# Patient Record
Sex: MALE | Race: WHITE | Employment: OTHER | URBAN - NONMETROPOLITAN AREA
[De-identification: names, ages, dates, MRNs, and addresses within clinical notes are randomized per-mention and may not be internally consistent; named-entity substitution may affect disease eponyms.]

---

## 2017-10-16 ENCOUNTER — HOSPITAL ENCOUNTER (EMERGENCY)
Age: 67
Discharge: HOME | End: 2017-10-16
Payer: MEDICARE

## 2017-10-16 VITALS
DIASTOLIC BLOOD PRESSURE: 68 MMHG | OXYGEN SATURATION: 96 % | HEART RATE: 85 BPM | RESPIRATION RATE: 18 BRPM | SYSTOLIC BLOOD PRESSURE: 131 MMHG | TEMPERATURE: 98.2 F

## 2017-10-16 DIAGNOSIS — S61.412A LACERATION OF LEFT HAND, FOREIGN BODY PRESENCE UNSPECIFIED, INITIAL ENCOUNTER: Primary | ICD-10-CM

## 2017-10-16 PROCEDURE — 6370000000 HC RX 637 (ALT 250 FOR IP): Performed by: PHYSICIAN ASSISTANT

## 2017-10-16 PROCEDURE — A6446 CONFORM BAND S W>=3" <5"/YD: HCPCS

## 2017-10-16 PROCEDURE — 99282 EMERGENCY DEPT VISIT SF MDM: CPT

## 2017-10-16 PROCEDURE — 2500000003 HC RX 250 WO HCPCS: Performed by: PHYSICIAN ASSISTANT

## 2017-10-16 PROCEDURE — 12001 RPR S/N/AX/GEN/TRNK 2.5CM/<: CPT

## 2017-10-16 RX ORDER — GINSENG 100 MG
CAPSULE ORAL ONCE
Status: COMPLETED | OUTPATIENT
Start: 2017-10-16 | End: 2017-10-16

## 2017-10-16 RX ORDER — LIDOCAINE HYDROCHLORIDE 10 MG/ML
5 INJECTION, SOLUTION INFILTRATION; PERINEURAL ONCE
Status: COMPLETED | OUTPATIENT
Start: 2017-10-16 | End: 2017-10-16

## 2017-10-16 RX ADMIN — BACITRACIN: 500 OINTMENT TOPICAL at 23:06

## 2017-10-16 RX ADMIN — LIDOCAINE HYDROCHLORIDE 5 ML: 10 INJECTION, SOLUTION INFILTRATION; PERINEURAL at 23:06

## 2017-10-16 ASSESSMENT — ENCOUNTER SYMPTOMS
ABDOMINAL PAIN: 0
RHINORRHEA: 0
WHEEZING: 0
SORE THROAT: 0
BACK PAIN: 0
EYE DISCHARGE: 0
NAUSEA: 0
COUGH: 0
EYE REDNESS: 0
VOMITING: 0
SHORTNESS OF BREATH: 0
DIARRHEA: 0

## 2017-10-16 ASSESSMENT — PAIN SCALES - GENERAL: PAINLEVEL_OUTOF10: 1

## 2017-10-17 NOTE — ED TRIAGE NOTES
Pt presents to ED with laceration on his left hand in between his thumb and index finger. Pt denies pain. No active bleeding noted at this time.

## 2017-10-17 NOTE — ED PROVIDER NOTES
[facility]  eMERGENCY dEPARTMENT eNCOUnter          CHIEF COMPLAINT       Chief Complaint   Patient presents with    Laceration       Nurses Notes reviewed and I agree except as noted in the HPI. HISTORY OF PRESENT ILLNESS    Ashley Samuels is a 79 y.o. male who presents to the Emergency Department for the evaluation of laceration to the web space between the 1st and 2nd digits on the left hand. The patient was cranking a cable to let down grain around 7:30 PM when the cable snapped and hit him. The patient stated he bleed a good amount at the beginning, but then it stopped by the time he got to his house. The patient ran his hand under water for a short time. The patient does not have many nerves in his hand due to another injury. The patient is right hand dominant. The patient's last tetanus shot was 3 or 4 years ago. The HPI was provided by the patient. REVIEW OF SYSTEMS     Review of Systems   Constitutional: Negative for appetite change, chills, fatigue and fever. HENT: Negative for congestion, ear pain, rhinorrhea and sore throat. Eyes: Negative for discharge, redness and visual disturbance. Respiratory: Negative for cough, shortness of breath and wheezing. Cardiovascular: Negative for chest pain, palpitations and leg swelling. Gastrointestinal: Negative for abdominal pain, diarrhea, nausea and vomiting. Genitourinary: Negative for decreased urine volume, difficulty urinating and dysuria. Musculoskeletal: Negative for arthralgias, back pain, joint swelling and neck pain. Skin: Positive for wound (laceration between web space of 1st and 2nd digit on left hand). Negative for pallor and rash. Allergic/Immunologic: Negative for environmental allergies. Neurological: Negative for dizziness, syncope, weakness, light-headedness and headaches. Hematological: Negative for adenopathy.    Psychiatric/Behavioral: Negative for agitation, confusion, dysphoric mood and exhibits no discharge. Left eye exhibits no discharge. No scleral icterus. Neck: Normal range of motion. Neck supple. No JVD present. Cardiovascular: Intact distal pulses. Pulmonary/Chest: Effort normal. No stridor. No respiratory distress. Abdominal: Soft. He exhibits no distension. Musculoskeletal: Normal range of motion. He exhibits no edema. Left wrist: Normal.        Left hand: He exhibits laceration (2.5 cm in web space betwen digit 1 and 2). He exhibits normal range of motion. Normal sensation noted. Normal strength (5/5) noted. Neurological: He is alert and oriented to person, place, and time. He displays no tremor. No sensory deficit. He exhibits normal muscle tone. Coordination normal. GCS eye subscore is 4. GCS verbal subscore is 5. GCS motor subscore is 6. Skin: Skin is warm and dry. He is not diaphoretic. No erythema. Psychiatric: He has a normal mood and affect. His behavior is normal.   Nursing note and vitals reviewed. DIFFERENTIAL DIAGNOSIS:   Laceration to left web space between 1st and 2nd digits    DIAGNOSTIC RESULTS     EKG: All EKG's are interpreted by the Emergency Department Physician who either signs or Co-signs this chart in the absence of a cardiologist.    None    RADIOLOGY: non-plain film images(s) such as CT, Ultrasound and MRI are read by the radiologist.    No orders to display     Patient refused xray    LABS:     Labs Reviewed - No data to display    EMERGENCY DEPARTMENT COURSE:   Vitals:    Vitals:    10/16/17 2047   BP: 131/68   Pulse: 85   Resp: 18   Temp: 98.2 °F (36.8 °C)   TempSrc: Oral   SpO2: 96%       9:38 PM: The patient was seen and evaluated. MDM:  Patient was seen and evaluated by me at bedside. Patient did not appear in any distress. History and physical exam were obtained. Neurovascular appears intact. No FB noted. No tendon injury noted. Patient declined X ray. Laceration repaired without complication.   The patient was comfortable that portions of this note were completed with a voice recognition program.  Efforts were made to edit the dictations but occasionally words are mis-transcribed.)    The patient was given an opportunity to see the Emergency Attending. The patient voiced understanding that I was a Mid-Level Provider and was in agreement with being seen independently by myself. Scribe:  Harrison Frausto 10/16/17 9:38 PM Scribing for and in the presence of [employer]. Signed by: Amaya Saldana, 10/16/17 9:59 PM    Provider:  I personally performed the services described in the documentation, reviewed and edited the documentation which was dictated to the scribe in my presence, and it accurately records my words and actions.     [employer] 10/16/17 9:59 PM        [employer], [id number]  10/18/17 3523

## 2018-08-31 ENCOUNTER — HOSPITAL ENCOUNTER (EMERGENCY)
Age: 68
Discharge: HOME | End: 2018-08-31
Attending: NURSE PRACTITIONER
Payer: MEDICARE

## 2018-08-31 VITALS
OXYGEN SATURATION: 97 % | DIASTOLIC BLOOD PRESSURE: 77 MMHG | BODY MASS INDEX: 25.73 KG/M2 | HEIGHT: 72 IN | HEART RATE: 106 BPM | SYSTOLIC BLOOD PRESSURE: 118 MMHG | TEMPERATURE: 97.7 F | WEIGHT: 190 LBS | RESPIRATION RATE: 18 BRPM

## 2018-08-31 DIAGNOSIS — S01.81XA FACIAL LACERATION, INITIAL ENCOUNTER: Primary | ICD-10-CM

## 2018-08-31 PROCEDURE — 12013 RPR F/E/E/N/L/M 2.6-5.0 CM: CPT

## 2018-08-31 PROCEDURE — 99213 OFFICE O/P EST LOW 20 MIN: CPT

## 2018-08-31 PROCEDURE — 2709999900 HC NON-CHARGEABLE SUPPLY

## 2018-08-31 PROCEDURE — 12013 RPR F/E/E/N/L/M 2.6-5.0 CM: CPT | Performed by: NURSE PRACTITIONER

## 2018-08-31 ASSESSMENT — PAIN DESCRIPTION - PAIN TYPE: TYPE: ACUTE PAIN

## 2018-08-31 ASSESSMENT — PAIN SCALES - GENERAL: PAINLEVEL_OUTOF10: 1

## 2018-08-31 ASSESSMENT — PAIN DESCRIPTION - FREQUENCY: FREQUENCY: CONTINUOUS

## 2018-08-31 ASSESSMENT — PAIN DESCRIPTION - ONSET: ONSET: SUDDEN

## 2018-08-31 ASSESSMENT — PAIN DESCRIPTION - DESCRIPTORS: DESCRIPTORS: SORE

## 2018-08-31 ASSESSMENT — PAIN DESCRIPTION - PROGRESSION: CLINICAL_PROGRESSION: NOT CHANGED

## 2018-08-31 ASSESSMENT — PAIN DESCRIPTION - LOCATION: LOCATION: FACE

## 2018-08-31 NOTE — ED PROVIDER NOTES
that he does not drink alcohol or use drugs. PHYSICAL EXAM     ED TRIAGE VITALS  BP: 118/77, Temp: 97.7 °F (36.5 °C), Pulse: 106, Resp: 18, SpO2: 97 %  Physical Exam   Constitutional: He is oriented to person, place, and time. He appears well-developed and well-nourished. No distress. Neurological: He is alert and oriented to person, place, and time. Skin: Skin is warm and dry. Laceration noted. Psychiatric: He has a normal mood and affect. Nursing note and vitals reviewed. DIAGNOSTIC RESULTS   Labs:No results found for this visit on 08/31/18. IMAGING:    URGENT CARE COURSE:     Vitals:    08/31/18 1728   BP: 118/77   Pulse: 106   Resp: 18   Temp: 97.7 °F (36.5 °C)   TempSrc: Temporal   SpO2: 97%   Weight: 190 lb (86.2 kg)   Height: 6' (1.829 m)       Medications - No data to display  PROCEDURES:  Lac Repair  Date/Time: 8/31/2018 6:00 PM  Performed by: Angelia Cardona by: Oneal Abel     Consent:     Consent obtained:  Verbal    Consent given by:  Patient    Risks discussed:  Poor cosmetic result, poor wound healing and infection  Anesthesia (see MAR for exact dosages): Anesthesia method:  Local infiltration    Local anesthetic:  Lidocaine 2% w/o epi  Laceration details:     Location:  Face    Face location:  R cheek    Length (cm):  3    Depth (mm):  0.2  Repair type:     Repair type:  Simple  Pre-procedure details:     Preparation:  Patient was prepped and draped in usual sterile fashion  Exploration:     Hemostasis achieved with:  Direct pressure    Contaminated: no    Treatment:     Area cleansed with:  Otoniel    Amount of cleaning:  Standard    Visualized foreign bodies/material removed: no    Skin repair:     Repair method:  Sutures    Suture size:  5-0    Suture technique:  Simple interrupted  Approximation:     Approximation:  Loose  Post-procedure details:     Dressing:  Non-adherent dressing    Patient tolerance of procedure:   Tolerated well, no immediate

## 2018-11-27 ENCOUNTER — OFFICE VISIT (OUTPATIENT)
Dept: ENT CLINIC | Age: 68
End: 2018-11-27
Payer: MEDICARE

## 2018-11-27 VITALS
HEART RATE: 80 BPM | RESPIRATION RATE: 14 BRPM | HEIGHT: 72 IN | SYSTOLIC BLOOD PRESSURE: 114 MMHG | BODY MASS INDEX: 26.55 KG/M2 | TEMPERATURE: 97.5 F | DIASTOLIC BLOOD PRESSURE: 76 MMHG | WEIGHT: 196 LBS

## 2018-11-27 DIAGNOSIS — J31.0 RHINITIS MEDICAMENTOSA: Primary | ICD-10-CM

## 2018-11-27 DIAGNOSIS — R68.2 DRY MOUTH: ICD-10-CM

## 2018-11-27 DIAGNOSIS — T48.5X5A RHINITIS MEDICAMENTOSA: Primary | ICD-10-CM

## 2018-11-27 DIAGNOSIS — R06.5 MOUTH BREATHING: ICD-10-CM

## 2018-11-27 PROCEDURE — G8419 CALC BMI OUT NRM PARAM NOF/U: HCPCS | Performed by: OTOLARYNGOLOGY

## 2018-11-27 PROCEDURE — G8427 DOCREV CUR MEDS BY ELIG CLIN: HCPCS | Performed by: OTOLARYNGOLOGY

## 2018-11-27 PROCEDURE — 1036F TOBACCO NON-USER: CPT | Performed by: OTOLARYNGOLOGY

## 2018-11-27 PROCEDURE — G8484 FLU IMMUNIZE NO ADMIN: HCPCS | Performed by: OTOLARYNGOLOGY

## 2018-11-27 PROCEDURE — 99203 OFFICE O/P NEW LOW 30 MIN: CPT | Performed by: OTOLARYNGOLOGY

## 2018-11-27 PROCEDURE — 3017F COLORECTAL CA SCREEN DOC REV: CPT | Performed by: OTOLARYNGOLOGY

## 2018-11-27 PROCEDURE — 1123F ACP DISCUSS/DSCN MKR DOCD: CPT | Performed by: OTOLARYNGOLOGY

## 2018-11-27 PROCEDURE — 4040F PNEUMOC VAC/ADMIN/RCVD: CPT | Performed by: OTOLARYNGOLOGY

## 2018-11-27 PROCEDURE — 1101F PT FALLS ASSESS-DOCD LE1/YR: CPT | Performed by: OTOLARYNGOLOGY

## 2018-11-27 RX ORDER — IPRATROPIUM BROMIDE 42 UG/1
2 SPRAY, METERED NASAL 3 TIMES DAILY
Qty: 1 BOTTLE | Refills: 3 | Status: SHIPPED | OUTPATIENT
Start: 2018-11-27 | End: 2020-07-02 | Stop reason: ALTCHOICE

## 2018-11-27 ASSESSMENT — ENCOUNTER SYMPTOMS
SHORTNESS OF BREATH: 0
FACIAL SWELLING: 0
SINUS PRESSURE: 0
VOICE CHANGE: 0
SORE THROAT: 0
NAUSEA: 0
COLOR CHANGE: 0
WHEEZING: 0
RHINORRHEA: 0
DIARRHEA: 0
ABDOMINAL PAIN: 0
VOMITING: 0
STRIDOR: 0
APNEA: 0
TROUBLE SWALLOWING: 0
CHOKING: 0
COUGH: 0
CHEST TIGHTNESS: 0

## 2019-01-15 ENCOUNTER — OFFICE VISIT (OUTPATIENT)
Dept: ENT CLINIC | Age: 69
End: 2019-01-15
Payer: MEDICARE

## 2019-01-15 VITALS
DIASTOLIC BLOOD PRESSURE: 72 MMHG | WEIGHT: 199 LBS | TEMPERATURE: 98.4 F | BODY MASS INDEX: 26.95 KG/M2 | RESPIRATION RATE: 16 BRPM | HEART RATE: 64 BPM | HEIGHT: 72 IN | SYSTOLIC BLOOD PRESSURE: 112 MMHG

## 2019-01-15 DIAGNOSIS — R09.81 NASAL CONGESTION: ICD-10-CM

## 2019-01-15 DIAGNOSIS — T48.5X5A RHINITIS MEDICAMENTOSA: ICD-10-CM

## 2019-01-15 DIAGNOSIS — J34.3 HYPERTROPHY OF INFERIOR NASAL TURBINATE: ICD-10-CM

## 2019-01-15 DIAGNOSIS — R68.2 DRY MOUTH: ICD-10-CM

## 2019-01-15 DIAGNOSIS — J31.0 RHINITIS MEDICAMENTOSA: ICD-10-CM

## 2019-01-15 DIAGNOSIS — R06.5 MOUTH BREATHING: ICD-10-CM

## 2019-01-15 DIAGNOSIS — J34.2 NASAL SEPTAL DEVIATION: Primary | ICD-10-CM

## 2019-01-15 PROCEDURE — G8427 DOCREV CUR MEDS BY ELIG CLIN: HCPCS | Performed by: OTOLARYNGOLOGY

## 2019-01-15 PROCEDURE — 3017F COLORECTAL CA SCREEN DOC REV: CPT | Performed by: OTOLARYNGOLOGY

## 2019-01-15 PROCEDURE — 1123F ACP DISCUSS/DSCN MKR DOCD: CPT | Performed by: OTOLARYNGOLOGY

## 2019-01-15 PROCEDURE — G8419 CALC BMI OUT NRM PARAM NOF/U: HCPCS | Performed by: OTOLARYNGOLOGY

## 2019-01-15 PROCEDURE — 4040F PNEUMOC VAC/ADMIN/RCVD: CPT | Performed by: OTOLARYNGOLOGY

## 2019-01-15 PROCEDURE — 1036F TOBACCO NON-USER: CPT | Performed by: OTOLARYNGOLOGY

## 2019-01-15 PROCEDURE — 1101F PT FALLS ASSESS-DOCD LE1/YR: CPT | Performed by: OTOLARYNGOLOGY

## 2019-01-15 PROCEDURE — G8484 FLU IMMUNIZE NO ADMIN: HCPCS | Performed by: OTOLARYNGOLOGY

## 2019-01-15 PROCEDURE — 99214 OFFICE O/P EST MOD 30 MIN: CPT | Performed by: OTOLARYNGOLOGY

## 2019-01-15 ASSESSMENT — ENCOUNTER SYMPTOMS
ABDOMINAL PAIN: 0
COUGH: 0
DIARRHEA: 0
APNEA: 0
SINUS PRESSURE: 0
FACIAL SWELLING: 0
COLOR CHANGE: 0
STRIDOR: 0
CHOKING: 0
RHINORRHEA: 0
CHEST TIGHTNESS: 0
TROUBLE SWALLOWING: 0
WHEEZING: 0
NAUSEA: 0
SHORTNESS OF BREATH: 0
VOICE CHANGE: 0
VOMITING: 0
SORE THROAT: 0

## 2019-03-08 DIAGNOSIS — T48.5X5A RHINITIS MEDICAMENTOSA: ICD-10-CM

## 2019-03-08 DIAGNOSIS — R06.5 MOUTH BREATHING: ICD-10-CM

## 2019-03-08 DIAGNOSIS — J34.2 NASAL SEPTAL DEVIATION: ICD-10-CM

## 2019-03-08 DIAGNOSIS — J34.3 HYPERTROPHY OF INFERIOR NASAL TURBINATE: ICD-10-CM

## 2019-03-08 DIAGNOSIS — J31.0 RHINITIS MEDICAMENTOSA: ICD-10-CM

## 2019-03-08 DIAGNOSIS — R68.2 DRY MOUTH: ICD-10-CM

## 2019-03-08 DIAGNOSIS — R09.81 NASAL CONGESTION: ICD-10-CM

## 2019-03-19 ENCOUNTER — OFFICE VISIT (OUTPATIENT)
Dept: ENT CLINIC | Age: 69
End: 2019-03-19

## 2019-03-19 VITALS
RESPIRATION RATE: 20 BRPM | SYSTOLIC BLOOD PRESSURE: 126 MMHG | BODY MASS INDEX: 27.21 KG/M2 | WEIGHT: 200.9 LBS | DIASTOLIC BLOOD PRESSURE: 78 MMHG | HEIGHT: 72 IN | TEMPERATURE: 97.6 F | HEART RATE: 66 BPM

## 2019-03-19 DIAGNOSIS — R68.2 DRY MOUTH: ICD-10-CM

## 2019-03-19 DIAGNOSIS — J34.3 HYPERTROPHY OF INFERIOR NASAL TURBINATE: ICD-10-CM

## 2019-03-19 DIAGNOSIS — R06.5 MOUTH BREATHING: ICD-10-CM

## 2019-03-19 DIAGNOSIS — J34.2 NASAL SEPTAL DEVIATION: Primary | ICD-10-CM

## 2019-03-19 DIAGNOSIS — R09.81 NASAL CONGESTION: ICD-10-CM

## 2019-03-19 PROCEDURE — 99999 PR OFFICE/OUTPT VISIT,PROCEDURE ONLY: CPT | Performed by: OTOLARYNGOLOGY

## 2019-03-19 ASSESSMENT — ENCOUNTER SYMPTOMS
CHEST TIGHTNESS: 0
SORE THROAT: 0
CHOKING: 0
ABDOMINAL PAIN: 0
NAUSEA: 0
SHORTNESS OF BREATH: 0
WHEEZING: 0
SINUS PRESSURE: 0
DIARRHEA: 0
COUGH: 0
TROUBLE SWALLOWING: 0
FACIAL SWELLING: 0
STRIDOR: 0
COLOR CHANGE: 0
VOICE CHANGE: 0
APNEA: 0
RHINORRHEA: 0
VOMITING: 0

## 2019-03-25 NOTE — PROGRESS NOTES
In preparation for their surgical procedure above patient was screened for Obstructive Sleep Apnea (VALDEZ) using the STOP-Bang Questionnaire by the Pre-Admission Testing department. This is a pre-surgical screening tool for patient safety and serves as a recommendation, this WILL NOT cause cancellation of surgery. STOP-Bang Questionnaire  * Do you currently see a pulmonologist?  No     If yes STOP, do not complete. 1.  Do you snore loudly (able to be heard in the next room)? Yes    2. Do you often feel tired or sleepy during the daytime? No       3. Has anyone ever told you that you stop breathing during your sleep? No    4. Do you have or are you being treated for high blood pressure? Yes      5. BMI more than 35? BMI (Calculated): 27.2        No    6. Age over 48 years? 76 y.o. Yes    7. Neck Circumference greater than 17 inches for male or 16 inches for female? Measured           (visits only)            Not Applicable    8. Gender Male? Yes      TOTAL SCORE: 4    VALDEZ - Low Risk : Yes to 0 - 2 questions  VALDEZ - Intermediate Risk : Yes to 3 - 4 questions  VALDEZ - High Risk : Yes to 5 - 8 questions    Adapted from:   STOP Questionnaire: A Tool to Screen Patients for Obstructive Sleep Apnea   CANDELARIA Nowak [facility] Alexi Greco M.B.B.S., Harris Clemens M.D., Jessica Vo, Ph.D., Jt Madrid M.B.B.S., Ruth Eng, M.Sc., Jennifer Griffin M.D., [facility]. [facility]    Anesthesiology 2008; 379:[id number] Copyright 2008, the [address] of Anesthesi[employer], [facility] 37.   ----------------------------------------------------------------------------------------------------------------

## 2019-03-28 ENCOUNTER — TELEPHONE (OUTPATIENT)
Dept: ENT CLINIC | Age: 69
End: 2019-03-28

## 2019-04-01 ENCOUNTER — HOSPITAL ENCOUNTER (OUTPATIENT)
Age: 69
Setting detail: OUTPATIENT SURGERY
Discharge: HOME | End: 2019-04-01
Attending: OTOLARYNGOLOGY | Admitting: OTOLARYNGOLOGY
Payer: MEDICARE

## 2019-04-01 ENCOUNTER — ANESTHESIA EVENT (OUTPATIENT)
Dept: OPERATING ROOM | Age: 69
End: 2019-04-01
Payer: MEDICARE

## 2019-04-01 ENCOUNTER — ANESTHESIA (OUTPATIENT)
Dept: OPERATING ROOM | Age: 69
End: 2019-04-01
Payer: MEDICARE

## 2019-04-01 VITALS
WEIGHT: 199.6 LBS | HEIGHT: 72 IN | HEART RATE: 86 BPM | BODY MASS INDEX: 27.04 KG/M2 | DIASTOLIC BLOOD PRESSURE: 85 MMHG | OXYGEN SATURATION: 99 % | TEMPERATURE: 96.5 F | RESPIRATION RATE: 16 BRPM | SYSTOLIC BLOOD PRESSURE: 159 MMHG

## 2019-04-01 VITALS
RESPIRATION RATE: 2 BRPM | SYSTOLIC BLOOD PRESSURE: 159 MMHG | DIASTOLIC BLOOD PRESSURE: 88 MMHG | TEMPERATURE: 94.8 F | OXYGEN SATURATION: 99 %

## 2019-04-01 DIAGNOSIS — J34.3 HYPERTROPHY OF INFERIOR NASAL TURBINATE: ICD-10-CM

## 2019-04-01 DIAGNOSIS — J34.2 NASAL SEPTAL DEVIATION: Primary | ICD-10-CM

## 2019-04-01 PROCEDURE — 2500000003 HC RX 250 WO HCPCS: Performed by: OTOLARYNGOLOGY

## 2019-04-01 PROCEDURE — 6360000002 HC RX W HCPCS: Performed by: OTOLARYNGOLOGY

## 2019-04-01 PROCEDURE — 2500000003 HC RX 250 WO HCPCS: Performed by: NURSE ANESTHETIST, CERTIFIED REGISTERED

## 2019-04-01 PROCEDURE — 7100000011 HC PHASE II RECOVERY - ADDTL 15 MIN: Performed by: OTOLARYNGOLOGY

## 2019-04-01 PROCEDURE — 6360000002 HC RX W HCPCS: Performed by: NURSE ANESTHETIST, CERTIFIED REGISTERED

## 2019-04-01 PROCEDURE — 6370000000 HC RX 637 (ALT 250 FOR IP): Performed by: OTOLARYNGOLOGY

## 2019-04-01 PROCEDURE — 3700000000 HC ANESTHESIA ATTENDED CARE: Performed by: OTOLARYNGOLOGY

## 2019-04-01 PROCEDURE — 7100000010 HC PHASE II RECOVERY - FIRST 15 MIN: Performed by: OTOLARYNGOLOGY

## 2019-04-01 PROCEDURE — 3700000001 HC ADD 15 MINUTES (ANESTHESIA): Performed by: OTOLARYNGOLOGY

## 2019-04-01 PROCEDURE — 3600000014 HC SURGERY LEVEL 4 ADDTL 15MIN: Performed by: OTOLARYNGOLOGY

## 2019-04-01 PROCEDURE — 2580000003 HC RX 258: Performed by: OTOLARYNGOLOGY

## 2019-04-01 PROCEDURE — 7100000000 HC PACU RECOVERY - FIRST 15 MIN: Performed by: OTOLARYNGOLOGY

## 2019-04-01 PROCEDURE — 7100000001 HC PACU RECOVERY - ADDTL 15 MIN: Performed by: OTOLARYNGOLOGY

## 2019-04-01 PROCEDURE — 2580000003 HC RX 258: Performed by: NURSE ANESTHETIST, CERTIFIED REGISTERED

## 2019-04-01 PROCEDURE — 2709999900 HC NON-CHARGEABLE SUPPLY: Performed by: OTOLARYNGOLOGY

## 2019-04-01 PROCEDURE — 3600000004 HC SURGERY LEVEL 4 BASE: Performed by: OTOLARYNGOLOGY

## 2019-04-01 RX ORDER — PREDNISONE 20 MG/1
20 TABLET ORAL DAILY
Qty: 4 TABLET | Refills: 0 | Status: SHIPPED | OUTPATIENT
Start: 2019-04-01 | End: 2019-04-04

## 2019-04-01 RX ORDER — SUCCINYLCHOLINE/SOD CL,ISO/PF 200MG/10ML
SYRINGE (ML) INTRAVENOUS PRN
Status: DISCONTINUED | OUTPATIENT
Start: 2019-04-01 | End: 2019-04-01 | Stop reason: SDUPTHER

## 2019-04-01 RX ORDER — ROPIVACAINE HYDROCHLORIDE 5 MG/ML
INJECTION, SOLUTION EPIDURAL; INFILTRATION; PERINEURAL PRN
Status: DISCONTINUED | OUTPATIENT
Start: 2019-04-01 | End: 2019-04-01 | Stop reason: ALTCHOICE

## 2019-04-01 RX ORDER — LIDOCAINE HYDROCHLORIDE AND EPINEPHRINE 10; 10 MG/ML; UG/ML
INJECTION, SOLUTION INFILTRATION; PERINEURAL PRN
Status: DISCONTINUED | OUTPATIENT
Start: 2019-04-01 | End: 2019-04-01 | Stop reason: ALTCHOICE

## 2019-04-01 RX ORDER — ONDANSETRON 2 MG/ML
INJECTION INTRAMUSCULAR; INTRAVENOUS PRN
Status: DISCONTINUED | OUTPATIENT
Start: 2019-04-01 | End: 2019-04-01 | Stop reason: SDUPTHER

## 2019-04-01 RX ORDER — GINSENG 100 MG
CAPSULE ORAL PRN
Status: DISCONTINUED | OUTPATIENT
Start: 2019-04-01 | End: 2019-04-01 | Stop reason: ALTCHOICE

## 2019-04-01 RX ORDER — EPINEPHRINE 1 MG/ML
INJECTION, SOLUTION, CONCENTRATE INTRAVENOUS PRN
Status: DISCONTINUED | OUTPATIENT
Start: 2019-04-01 | End: 2019-04-01 | Stop reason: ALTCHOICE

## 2019-04-01 RX ORDER — SODIUM CHLORIDE 9 MG/ML
INJECTION, SOLUTION INTRAVENOUS CONTINUOUS PRN
Status: DISCONTINUED | OUTPATIENT
Start: 2019-04-01 | End: 2019-04-01 | Stop reason: SDUPTHER

## 2019-04-01 RX ORDER — NEOSTIGMINE METHYLSULFATE 5 MG/5 ML
SYRINGE (ML) INTRAVENOUS PRN
Status: DISCONTINUED | OUTPATIENT
Start: 2019-04-01 | End: 2019-04-01 | Stop reason: SDUPTHER

## 2019-04-01 RX ORDER — SODIUM CHLORIDE 9 MG/ML
INJECTION, SOLUTION INTRAVENOUS CONTINUOUS
Status: DISCONTINUED | OUTPATIENT
Start: 2019-04-01 | End: 2019-04-01 | Stop reason: HOSPADM

## 2019-04-01 RX ORDER — RANITIDINE 150 MG/1
150 TABLET ORAL 2 TIMES DAILY
Qty: 60 TABLET | Refills: 0 | Status: SHIPPED | OUTPATIENT
Start: 2019-04-01 | End: 2020-07-02 | Stop reason: ALTCHOICE

## 2019-04-01 RX ORDER — GLYCOPYRROLATE 1 MG/5 ML
SYRINGE (ML) INTRAVENOUS PRN
Status: DISCONTINUED | OUTPATIENT
Start: 2019-04-01 | End: 2019-04-01 | Stop reason: SDUPTHER

## 2019-04-01 RX ORDER — LIDOCAINE HYDROCHLORIDE 20 MG/ML
INJECTION, SOLUTION INFILTRATION; PERINEURAL PRN
Status: DISCONTINUED | OUTPATIENT
Start: 2019-04-01 | End: 2019-04-01 | Stop reason: SDUPTHER

## 2019-04-01 RX ORDER — OXYMETAZOLINE HYDROCHLORIDE 0.05 G/100ML
SPRAY NASAL PRN
Status: DISCONTINUED | OUTPATIENT
Start: 2019-04-01 | End: 2019-04-01 | Stop reason: ALTCHOICE

## 2019-04-01 RX ORDER — SULFAMETHOXAZOLE AND TRIMETHOPRIM 800; 160 MG/1; MG/1
1 TABLET ORAL 2 TIMES DAILY
Qty: 28 TABLET | Refills: 2 | Status: SHIPPED | OUTPATIENT
Start: 2019-04-01 | End: 2019-04-15

## 2019-04-01 RX ORDER — ROCURONIUM BROMIDE 10 MG/ML
INJECTION, SOLUTION INTRAVENOUS PRN
Status: DISCONTINUED | OUTPATIENT
Start: 2019-04-01 | End: 2019-04-01 | Stop reason: SDUPTHER

## 2019-04-01 RX ORDER — HYDROCODONE BITARTRATE AND ACETAMINOPHEN 7.5; 325 MG/1; MG/1
1 TABLET ORAL EVERY 6 HOURS PRN
Qty: 20 TABLET | Refills: 0 | Status: SHIPPED | OUTPATIENT
Start: 2019-04-01 | End: 2019-04-06

## 2019-04-01 RX ORDER — DEXAMETHASONE SODIUM PHOSPHATE 4 MG/ML
12 INJECTION, SOLUTION INTRA-ARTICULAR; INTRALESIONAL; INTRAMUSCULAR; INTRAVENOUS; SOFT TISSUE
Status: DISCONTINUED | OUTPATIENT
Start: 2019-04-01 | End: 2019-04-01 | Stop reason: HOSPADM

## 2019-04-01 RX ORDER — FENTANYL CITRATE 50 UG/ML
INJECTION, SOLUTION INTRAMUSCULAR; INTRAVENOUS PRN
Status: DISCONTINUED | OUTPATIENT
Start: 2019-04-01 | End: 2019-04-01 | Stop reason: SDUPTHER

## 2019-04-01 RX ORDER — PROPOFOL 10 MG/ML
INJECTION, EMULSION INTRAVENOUS PRN
Status: DISCONTINUED | OUTPATIENT
Start: 2019-04-01 | End: 2019-04-01 | Stop reason: SDUPTHER

## 2019-04-01 RX ORDER — EPHEDRINE SULFATE/0.9% NACL/PF 50 MG/5 ML
SYRINGE (ML) INTRAVENOUS PRN
Status: DISCONTINUED | OUTPATIENT
Start: 2019-04-01 | End: 2019-04-01 | Stop reason: SDUPTHER

## 2019-04-01 RX ORDER — MIDAZOLAM HYDROCHLORIDE 1 MG/ML
INJECTION INTRAMUSCULAR; INTRAVENOUS PRN
Status: DISCONTINUED | OUTPATIENT
Start: 2019-04-01 | End: 2019-04-01 | Stop reason: SDUPTHER

## 2019-04-01 RX ADMIN — Medication 160 MG: at 07:53

## 2019-04-01 RX ADMIN — SODIUM CHLORIDE: 9 INJECTION, SOLUTION INTRAVENOUS at 07:47

## 2019-04-01 RX ADMIN — FAMOTIDINE 20 MG: 10 INJECTION, SOLUTION INTRAVENOUS at 10:58

## 2019-04-01 RX ADMIN — LIDOCAINE HYDROCHLORIDE 100 MG: 20 INJECTION, SOLUTION INFILTRATION; PERINEURAL at 07:52

## 2019-04-01 RX ADMIN — ROCURONIUM BROMIDE 10 MG: 10 INJECTION INTRAVENOUS at 09:26

## 2019-04-01 RX ADMIN — FENTANYL CITRATE 50 MCG: 50 INJECTION INTRAMUSCULAR; INTRAVENOUS at 07:45

## 2019-04-01 RX ADMIN — Medication 5 MG: at 09:43

## 2019-04-01 RX ADMIN — DEXAMETHASONE SODIUM PHOSPHATE 12 MG: 4 INJECTION, SOLUTION INTRAMUSCULAR; INTRAVENOUS at 07:02

## 2019-04-01 RX ADMIN — PROPOFOL 160 MG: 10 INJECTION, EMULSION INTRAVENOUS at 07:52

## 2019-04-01 RX ADMIN — ONDANSETRON HYDROCHLORIDE 4 MG: 4 INJECTION, SOLUTION INTRAMUSCULAR; INTRAVENOUS at 10:22

## 2019-04-01 RX ADMIN — ROCURONIUM BROMIDE 30 MG: 10 INJECTION INTRAVENOUS at 08:16

## 2019-04-01 RX ADMIN — Medication 5 MG: at 08:56

## 2019-04-01 RX ADMIN — SODIUM CHLORIDE: 9 INJECTION, SOLUTION INTRAVENOUS at 07:01

## 2019-04-01 RX ADMIN — Medication 3 MG: at 10:25

## 2019-04-01 RX ADMIN — Medication 0.4 MG: at 10:25

## 2019-04-01 RX ADMIN — MIDAZOLAM HYDROCHLORIDE 2 MG: 1 INJECTION, SOLUTION INTRAMUSCULAR; INTRAVENOUS at 07:45

## 2019-04-01 RX ADMIN — FENTANYL CITRATE 50 MCG: 50 INJECTION INTRAMUSCULAR; INTRAVENOUS at 07:52

## 2019-04-01 RX ADMIN — ROCURONIUM BROMIDE 10 MG: 10 INJECTION INTRAVENOUS at 08:58

## 2019-04-01 RX ADMIN — PHENYLEPHRINE HYDROCHLORIDE 100 MCG: 10 INJECTION INTRAVENOUS at 08:29

## 2019-04-01 ASSESSMENT — PULMONARY FUNCTION TESTS
PIF_VALUE: 18
PIF_VALUE: 17
PIF_VALUE: 16
PIF_VALUE: 18
PIF_VALUE: 7
PIF_VALUE: 17
PIF_VALUE: 16
PIF_VALUE: 17
PIF_VALUE: 15
PIF_VALUE: 16
PIF_VALUE: 17
PIF_VALUE: 3
PIF_VALUE: 17
PIF_VALUE: 17
PIF_VALUE: 18
PIF_VALUE: 17
PIF_VALUE: 16
PIF_VALUE: 15
PIF_VALUE: 17
PIF_VALUE: 3
PIF_VALUE: 8
PIF_VALUE: 15
PIF_VALUE: 17
PIF_VALUE: 17
PIF_VALUE: 4
PIF_VALUE: 8
PIF_VALUE: 49
PIF_VALUE: 16
PIF_VALUE: 17
PIF_VALUE: 17
PIF_VALUE: 15
PIF_VALUE: 14
PIF_VALUE: 10
PIF_VALUE: 11
PIF_VALUE: 17
PIF_VALUE: 18
PIF_VALUE: 49
PIF_VALUE: 17
PIF_VALUE: 12
PIF_VALUE: 18
PIF_VALUE: 18
PIF_VALUE: 17
PIF_VALUE: 15
PIF_VALUE: 1
PIF_VALUE: 17
PIF_VALUE: 17
PIF_VALUE: 18
PIF_VALUE: 17
PIF_VALUE: 15
PIF_VALUE: 18
PIF_VALUE: 15
PIF_VALUE: 7
PIF_VALUE: 17
PIF_VALUE: 18
PIF_VALUE: 15
PIF_VALUE: 17
PIF_VALUE: 1
PIF_VALUE: 17
PIF_VALUE: 16
PIF_VALUE: 18
PIF_VALUE: 16
PIF_VALUE: 17
PIF_VALUE: 17
PIF_VALUE: 18
PIF_VALUE: 17
PIF_VALUE: 17
PIF_VALUE: 18
PIF_VALUE: 16
PIF_VALUE: 17
PIF_VALUE: 16
PIF_VALUE: 17
PIF_VALUE: 18
PIF_VALUE: 17
PIF_VALUE: 15
PIF_VALUE: 3
PIF_VALUE: 23
PIF_VALUE: 17
PIF_VALUE: 18
PIF_VALUE: 17
PIF_VALUE: 15
PIF_VALUE: 17
PIF_VALUE: 18
PIF_VALUE: 17
PIF_VALUE: 18
PIF_VALUE: 18
PIF_VALUE: 17
PIF_VALUE: 17
PIF_VALUE: 18
PIF_VALUE: 17
PIF_VALUE: 17
PIF_VALUE: 18
PIF_VALUE: 17
PIF_VALUE: 18
PIF_VALUE: 17
PIF_VALUE: 18
PIF_VALUE: 17
PIF_VALUE: 17
PIF_VALUE: 4
PIF_VALUE: 17
PIF_VALUE: 15
PIF_VALUE: 17
PIF_VALUE: 18
PIF_VALUE: 17
PIF_VALUE: 8
PIF_VALUE: 17
PIF_VALUE: 2
PIF_VALUE: 18
PIF_VALUE: 15
PIF_VALUE: 18
PIF_VALUE: 15
PIF_VALUE: 15
PIF_VALUE: 17
PIF_VALUE: 17
PIF_VALUE: 18
PIF_VALUE: 15
PIF_VALUE: 17
PIF_VALUE: 4
PIF_VALUE: 17
PIF_VALUE: 2
PIF_VALUE: 5
PIF_VALUE: 17
PIF_VALUE: 5
PIF_VALUE: 16
PIF_VALUE: 8
PIF_VALUE: 17
PIF_VALUE: 2
PIF_VALUE: 0
PIF_VALUE: 17

## 2019-04-01 ASSESSMENT — PAIN - FUNCTIONAL ASSESSMENT: PAIN_FUNCTIONAL_ASSESSMENT: 0-10

## 2019-04-01 ASSESSMENT — PAIN SCALES - GENERAL
PAINLEVEL_OUTOF10: 0

## 2019-04-01 NOTE — H&P
Pressure Mother      Cancer Father           prostate    High Blood Pressure Father      Kidney Disease Father      Diabetes Brother           borderline         Social History           Tobacco Use    Smoking status: Never Smoker    Smokeless tobacco: Never Used   Substance Use Topics    Alcohol use: No         Subjective:      Review of Systems   Constitutional: Negative for activity change, appetite change, chills, diaphoresis, fatigue, fever and unexpected weight change. HENT: Negative for congestion, dental problem, ear discharge, ear pain, facial swelling, hearing loss, mouth sores, nosebleeds, postnasal drip, rhinorrhea, sinus pressure, sneezing, sore throat, tinnitus, trouble swallowing and voice change. Eyes: Negative for visual disturbance. Respiratory: Negative for apnea, cough, choking, chest tightness, shortness of breath, wheezing and stridor. Cardiovascular: Negative for chest pain, palpitations and leg swelling. Gastrointestinal: Negative for abdominal pain, diarrhea, nausea and vomiting. Endocrine: Negative for cold intolerance, heat intolerance, polydipsia and polyuria. Genitourinary: Negative for dysuria, enuresis and hematuria. Musculoskeletal: Negative for arthralgias, gait problem, neck pain and neck stiffness. Skin: Negative for color change and rash. Allergic/Immunologic: Negative for environmental allergies, food allergies and immunocompromised state. Neurological: Positive for speech difficulty. Negative for dizziness, syncope, facial asymmetry, light-headedness and headaches. Hematological: Negative for adenopathy. Does not bruise/bleed easily. Psychiatric/Behavioral: Negative for confusion and sleep disturbance.  The patient is not nervous/anxious.          Objective:      /78   Pulse 66   Temp 97.6 °F (36.4 °C)   Resp 20   Ht 6' (1.829 m)   Wt 200 lb 14.4 oz (91.1 kg)   BMI 27.25 kg/m²      Physical Exam   Constitutional: He is oriented to person, place, and time. He appears well-developed and well-nourished. He is cooperative. HENT:   Head: Normocephalic and atraumatic. Head is without laceration. Right Ear: Hearing, tympanic membrane, external ear and ear canal normal. No drainage or swelling. Tympanic membrane is not scarred, not perforated and not erythematous. Tympanic membrane mobility is normal (on pneumatic massage). No middle ear effusion. Left Ear: Hearing, tympanic membrane, external ear and ear canal normal. No drainage or swelling. Tympanic membrane is not scarred, not perforated and not erythematous. Tympanic membrane mobility is normal (on pneumatic massage). No middle ear effusion. Nose: Septal deviation (2+ right sublox to the left with bilateral obstruction  ) present. No mucosal edema or rhinorrhea. Mouth/Throat: Uvula is midline, oropharynx is clear and moist and mucous membranes are normal. Mucous membranes are not pale and not dry. No oral lesions. No uvula swelling. No oropharyngeal exudate, posterior oropharyngeal edema or posterior oropharyngeal erythema. Turbinates: 2+  hypertrophy of inferior  LIps: lips normal    Teeth and gums:good dentition   Mallampati 1  Tonsils:Unremarkable  Base of tongue: symmetric,  Larynx, mirror exam: unable due to gag reflex     Eyes: Right eye exhibits normal extraocular motion and no nystagmus. Left eye exhibits normal extraocular motion and no nystagmus. Conjugate gaze   Neck: Trachea normal and phonation normal. Neck supple. No spinous process tenderness present. No tracheal deviation present. No thyroid mass and no thyromegaly present. No adenopathy. Salivary glands not enlarged and normal to palpation     Cardiovascular: Normal rate and regular rhythm. No murmur heard. Pulmonary/Chest: Effort normal and breath sounds normal. No stridor. Chest wall is not dull to percussion. Neurological: He is alert and oriented to person, place, and time.  No cranial nerve deficit (VIIth N function intact bilat). Psychiatric: He has a normal mood and affect. Nursing note and vitals reviewed.        Data:  All of the past medical history, past surgical history, family history,social history, allergies and current medications were reviewed with the patient. Assessment & Plan   Diagnoses and all orders for this visit:       Diagnosis Orders   1. Nasal septal deviation      2. Hypertrophy of inferior nasal turbinate      3. Mouth breathing      4. Dry mouth      5. Nasal congestion            The findings were explained and his questions were answered. Surgery was again discussed in detail. He is ready to proceed with surgery.      Recommended surgical procedures are  Septoplasty  Partial submucous resection (SMR)  inferior turbinate left  Surgical time estimate 1.75 hours        Informed consent: Septoplasty complications that may occur were discussed including postoperative bleeding (usually easy to control), infection, nasal crusting, a hole in the septum (perforation), failure to completely improve breathing, persistent septal deflection resulting in the need for revision (uncommon), and very rarely, a change in appearance. They understand that no guarantees are made regarding either outcome or potential complications. They read the patient information sheet and were given a copy to take with them. All of their questions were answered. They requested we proceed.     Return for post op exam.     IMarco Antonio CMA (AAMA), am scribing for, and in the presence of Dr. Jenni Sanford. Electronically signed by Marco Antonio Keyes on 3/19/19 at 10:59 AM.      (Please note that portions of this note were completed with a voice recognition program. Efforts were made to edit the dictations butoccasionally words are mis-transcribed.)     I agree to the above documentation placed by my scribe. I have personally evaluated this patient. Additional findings are as noted.   I reviewed the scribe's note and agree with the documented findings and plan of care. Any areas of disagreement are corrected.  I agree with the chief complaint, past medical history, past surgical history, allergies, medications, social and family history as documented unless otherwise noted below.      Electronically signed by Daljit Veliz MD on 3/31/2019 at 10:47 PM

## 2019-04-01 NOTE — ANESTHESIA PRE PROCEDURE
Department of Anesthesiology  Preprocedure Note       Name:  Kathleen Kasper   Age:  76 y.o.  :  1950                                          MRN:  [id number]         Date:  2019      Surgeon: Zeus Finley):  Rosa Wong MD    Procedure: SEPTOPLASTY, SUBMUCOUS RESECT TURBINATESD (SMR), PATRIAL, LEFT INFERIOR, POSSIBLE RIGHT (Left Nose)    Medications prior to admission:   Prior to Admission medications    Medication Sig Start Date End Date Taking? Authorizing Provider   ipratropium (ATROVENT) 0.06 % nasal spray 2 sprays by Nasal route 3 times daily 18  Yes Rosa Wong MD   sulfaSALAzine (AZULFIDINE) 500 MG tablet Take 500 mg by mouth 2 times daily   Yes Historical Provider, MD   ezetimibe (ZETIA) 10 MG tablet Take 10 mg by mouth daily   Yes Historical Provider, MD   lisinopril (PRINIVIL;ZESTRIL) 10 MG tablet Take 10 mg by mouth daily. Yes Historical Provider, MD   Calcium Carbonate (CALCIUM 500 PO) Take  by mouth. Yes Historical Provider, MD   hydroxychloroquine (PLAQUENIL) 200 MG tablet Take  by mouth daily. 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 Sravanthi Sood COLONOSCOPY  around 2008    COLONOSCOPY  9/4/13    Dr. Wendy Siegel Bilateral [id number]    inguinal    KNEE ARTHROSCOPY Right 2013       Social History:    Social History     Tobacco Use    Smoking status: Never Smoker    Smokeless tobacco: Never Used   Substance Use Topics    Alcohol use: No                                Counseling given: Not Answered      Vital Signs (Current):   Vitals:    03/25/19 1028 04/01/19 0633   BP:  (!) 152/88   Pulse:  65   Resp:  16   Temp:  97.6 °F (36.4 °C)   TempSrc:  Temporal   SpO2:  94%   Weight: 200 lb (90.7 kg) 199 lb 9.6 oz (90.5 kg)   Height: 6' (1.829 m) 6' (1.829 m)                                              BP Readings from Last 3 Encounters:   04/01/19 (!) 152/88   03/19/19 126/78   01/15/19 112/72       NPO Status: Time of last liquid consumption: 2000                        Time of last solid consumption: 2000                        Date of last liquid consumption: 03/31/19                        Date of last solid food consumption: 03/31/19    BMI:   Wt Readings from Last 3 Encounters:   04/01/19 199 lb 9.6 oz (90.5 kg)   03/19/19 200 lb 14.4 oz (91.1 kg)   01/15/19 199 lb (90.3 kg)     Body mass index is 27.07 kg/m².     CBC:   Lab Results   Component Value Date    WBC 7.3 08/25/2015    RBC 4.09 08/25/2015    HGB 13.0 08/25/2015    HCT 38.6 08/25/2015    MCV 94.6 08/25/2015    RDW 12.5 08/25/2015     08/25/2015       CMP:   Lab Results   Component Value Date     08/25/2015    K 3.0 08/25/2015     08/25/2015    CO2 20 08/25/2015    BUN 12 08/25/2015    CREATININE 0.9 08/25/2015    LABGLOM 85 08/25/2015    GLUCOSE 103 08/25/2015    PROT 7.8 07/21/2013    CALCIUM 8.5 09/17/2013    BILITOT 0.7 07/21/2013    ALKPHOS 48 07/21/2013    AST 24 07/21/2013 ALT 21 07/21/2013       POC Tests: No results for input(s): POCGLU, POCNA, POCK, POCCL, POCBUN, POCHEMO, POCHCT in the last 72 hours. Coags: No results found for: PROTIME, INR, APTT    HCG (If Applicable): No results found for: PREGTESTUR, PREGSERUM, HCG, HCGQUANT     ABGs: No results found for: PHART, PO2ART, MBS7ZUA, FVF4DIV, BEART, J4TZWUCQ     Type & Screen (If Applicable):  Lab Results   Component Value Date    [address] POS 08/25/2015       Anesthesia Evaluation  Patient summary reviewed no history of anesthetic complications:   Airway: Mallampati: II  TM distance: >3 FB   Neck ROM: full  Mouth opening: > = 3 FB Dental: normal exam         Pulmonary:normal exam  breath sounds clear to auscultation                             Cardiovascular:    (+) hypertension:, hyperlipidemia        Rhythm: regular  Rate: normal                    Neuro/Psych:               GI/Hepatic/Renal:             Endo/Other:    (+) : arthritis:., .                 Abdominal:           Vascular:                                        Anesthesia Plan      general     ASA 2       Induction: intravenous. MIPS: Postoperative opioids intended and Prophylactic antiemetics administered. Anesthetic plan and risks discussed with patient and spouse. Plan discussed with surgical team and CRNA.                   Macario East MD   4/1/2019

## 2019-04-01 NOTE — PROGRESS NOTES
Patient returned to [facility]. [place] [facility] [place]. Family at bedside.

## 2019-04-01 NOTE — ANESTHESIA POSTPROCEDURE EVALUATION
Department of Anesthesiology  Postprocedure Note    Patient: Ronni Irving  MRN: [id number]  YOB: 1950  Date of evaluation: 4/1/2019  Time:  1:14 PM     Procedure Summary     Date:  04/01/19 Room / Location:  [facility] Rosy Mathias    Anesthesia Start:  0710 Anesthesia Stop:  5710    Procedure:  SEPTOPLASTY, SUBMUCOUS RESECT TURBINATESD (SMR), PATRIAL, LEFT INFERIOR, POSSIBLE RIGHT (Left Nose) Diagnosis:  (NASAL SEPTAL DEVIATION)    Surgeon:  Jenni Sanford MD Responsible Provider:  Yuni Santos MD    Anesthesia Type:  general ASA Status:  2          Anesthesia Type: general    Karin Phase I: Karin Score: 9    Karin Phase II: Karin Score: 10    Last vitals: Reviewed and per EMR flowsheets. Anesthesia Post Evaluation    Patient location during evaluation: PACU  Patient participation: complete - patient participated  Level of consciousness: awake and alert  Airway patency: patent  Nausea & Vomiting: no nausea and no vomiting  Complications: no  Cardiovascular status: hemodynamically stable  Respiratory status: acceptable  Hydration status: euvolemic      [address]  POST-ANESTHESIA NOTE       Name:  Ronni Irving                                         Age:  76 y.o.   MRN:  [id number]      Last Vitals:  BP (!) 159/85   Pulse 86   Temp 96.5 °F (35.8 °C) (Temporal)   Resp 16   Ht 6' (1.829 m)   Wt 199 lb 9.6 oz (90.5 kg)   SpO2 99%   BMI 27.07 kg/m²   Patient Vitals for the past 4 hrs:   BP Temp Temp src Pulse Resp SpO2   04/01/19 1241 (!) 159/85 -- -- 86 16 99 %   04/01/19 1200 (!) 159/87 -- -- 82 16 94 %   04/01/19 1134 (!) 174/84 96.5 °F (35.8 °C) Temporal 87 16 94 %   04/01/19 1125 (!) 152/87 -- -- 80 24 --   04/01/19 1120 (!) 153/81 -- -- 81 12 97 %   04/01/19 1115 (!) 150/74 -- -- 83 14 100 %   04/01/19 1110 (!) 157/73 -- -- 85 12 98 %   04/01/19 1105 (!) 151/73 -- -- 78 25 97 %   04/01/19 1100 (!) 146/76 -- -- 81 10 98 %   04/01/19 1055 (!) 154/84 -- -- 80 18 98 % 04/01/19 1050 (!) 175/87 -- -- 81 8 97 %   04/01/19 1045 (!) 174/85 -- -- 84 13 95 %   04/01/19 1040 -- -- -- 94 -- 97 %   04/01/19 1039 -- 97.7 °F (36.5 °C) Temporal -- -- --       Level of Consciousness:  Awake    Respiratory:  Stable    Oxygen Saturation:  Stable    Cardiovascular:  Stable    Hydration:  Adequate    PONV:  Stable    Post-op Pain:  Adequate analgesia    Post-op Assessment:  No apparent anesthetic complications    Additional Follow-Up / Treatment / Comment:  None    Gaviota Gray MD  April 1, 2019   1:14 PM

## 2019-04-01 NOTE — PROGRESS NOTES
Pt and family oriented to SDS 5 and unit. Fall band applied. Vaccine information given. Plan of care reviewed.

## 2019-04-01 NOTE — BRIEF OP NOTE
Brief Postoperative Note  ______________________________________________________________    Patient: Nigel Marinelli  YOB: 1950  MRN: [id number]  Date of Procedure: 4/1/2019    Pre-Op Diagnosis: NASAL SEPTAL DEVIATION    Post-Op Diagnosis: Same       Procedure(s):  SEPTOPLASTY, SUBMUCOUS RESECT TURBINATESD (SMR), PATRIAL, LEFT INFERIOR,     Anesthesia: General    Surgeon(s):  Samantha Sullivan MD    Assistant:     Estimated Blood Loss (mL): less than 50     Complications: None    Specimens:   * No specimens in log *    Implants:  * No implants in log *      Drains: * No LDAs found *    Findings: CONVEX RIGHT SEPTAL DEVIATION, INFERIORLY, LARGE LEFT INFERIOR TURBINATE ABOVE THAT.     Jame Cruz MD  Date: 4/1/2019  Time: 10:54 AM

## 2019-04-01 NOTE — INTERVAL H&P NOTE
Pt Name: Lilia Zhang  MRN: [id number]  YOB: 1950  Date of evaluation: 4/1/2019    I have examined the patient and reviewed the H&P/Consult and there are no changes to the patient or plans.          Electronically signed by Kamala Middleton MD on 4/1/2019 at 7:50 AM

## 2019-04-01 NOTE — PROGRESS NOTES
Discharge criteria met. Discharge instructions given to the patient and his wife.  Both verbalized understanding of the discharge instructions

## 2019-04-02 ENCOUNTER — OFFICE VISIT (OUTPATIENT)
Dept: ENT CLINIC | Age: 69
End: 2019-04-02

## 2019-04-02 VITALS
WEIGHT: 201.8 LBS | RESPIRATION RATE: 12 BRPM | TEMPERATURE: 97.5 F | HEIGHT: 72 IN | SYSTOLIC BLOOD PRESSURE: 138 MMHG | HEART RATE: 64 BPM | BODY MASS INDEX: 27.33 KG/M2 | DIASTOLIC BLOOD PRESSURE: 82 MMHG

## 2019-04-02 DIAGNOSIS — R06.5 MOUTH BREATHING: ICD-10-CM

## 2019-04-02 DIAGNOSIS — J34.3 HYPERTROPHY OF INFERIOR NASAL TURBINATE: ICD-10-CM

## 2019-04-02 DIAGNOSIS — J34.2 NASAL SEPTAL DEVIATION: Primary | ICD-10-CM

## 2019-04-02 PROCEDURE — 99024 POSTOP FOLLOW-UP VISIT: CPT | Performed by: OTOLARYNGOLOGY

## 2019-04-02 ASSESSMENT — ENCOUNTER SYMPTOMS
SHORTNESS OF BREATH: 0
SORE THROAT: 0
RHINORRHEA: 0
COUGH: 0
VOICE CHANGE: 0
DIARRHEA: 0
VOMITING: 0
NAUSEA: 0
STRIDOR: 0
COLOR CHANGE: 0
TROUBLE SWALLOWING: 0
FACIAL SWELLING: 0
ABDOMINAL PAIN: 0
CHEST TIGHTNESS: 0
WHEEZING: 0
SINUS PRESSURE: 0
CHOKING: 0
APNEA: 0

## 2019-04-02 NOTE — PROGRESS NOTES
[address] [address], NOSE AND THROAT  [facility] [address]  [address]  [address] [address]  Dept: [phone]  Dept Fax: [phone]  Loc: [phone]    Lori Pichardo is a 76 y.o. male who was referred byNo ref. provider found for:  Chief Complaint   Patient presents with    Post-Op Check     Patient is here post-op septo/SMR 4/1/19   . HPI:     Lori Pichardo is a 76 y.o. male who presents today for off on his surgery yesterday. He is doing well. He has some congestion. Chino Meade History: Allergies   Allergen Reactions    Seasonal      Current Outpatient Medications   Medication Sig Dispense Refill    ranitidine (ZANTAC) 150 MG tablet Take 1 tablet by mouth 2 times daily 60 tablet 0    ipratropium (ATROVENT) 0.06 % nasal spray 2 sprays by Nasal route 3 times daily 1 Bottle 3    sulfaSALAzine (AZULFIDINE) 500 MG tablet Take 500 mg by mouth 2 times daily      ezetimibe (ZETIA) 10 MG tablet Take 10 mg by mouth daily      lisinopril (PRINIVIL;ZESTRIL) 10 MG tablet Take 10 mg by mouth daily.  Calcium Carbonate (CALCIUM 500 PO) Take  by mouth.  hydroxychloroquine (PLAQUENIL) 200 MG tablet Take  by mouth daily. 2 tab       No current facility-administered medications for this visit.       Past Medical History:   Diagnosis Date    Arthritis     Colon abnormality Aug 2013    \"perforated bowel\" scheduled for colonoscopy    Hyperlipidemia     Hypertension       Past Surgical History:   Procedure Laterality Date    COLECTOMY  9-16-13    robotic LAR with firefly and rigid sigmoidoscopy-Dr. Nasra Madrid     COLONOSCOPY  around 2008    COLONOSCOPY  9/4/13    Dr. Karla Kelley Bilateral [id number]    inguinal    KNEE ARTHROSCOPY Right 2013    SEPTOPLASTY Left 4/1/2019    SEPTOPLASTY, SUBMUCOUS RESECT TURBINATESD (SMR), PATRIAL, LEFT INFERIOR, POSSIBLE RIGHT performed by Moises Calvillo MD at [address] [place] [address] History   Problem Relation Age of Onset    Arthritis Mother     High Blood Pressure Mother     Cancer Father         prostate    High Blood Pressure Father     Diabetes Brother         borderline     Social History     Tobacco Use    Smoking status: Never Smoker    Smokeless tobacco: Never Used   Substance Use Topics    Alcohol use: No       Subjective:      Review of Systems   Constitutional: Negative for activity change, appetite change, chills, diaphoresis, fatigue, fever and unexpected weight change. HENT: Negative for congestion, dental problem, ear discharge, ear pain, facial swelling, hearing loss, mouth sores, nosebleeds, postnasal drip, rhinorrhea, sinus pressure, sneezing, sore throat, tinnitus, trouble swallowing and voice change. Eyes: Negative for visual disturbance. Respiratory: Negative for apnea, cough, choking, chest tightness, shortness of breath, wheezing and stridor. Cardiovascular: Negative for chest pain, palpitations and leg swelling. Gastrointestinal: Negative for abdominal pain, diarrhea, nausea and vomiting. Endocrine: Negative for cold intolerance, heat intolerance, polydipsia and polyuria. Genitourinary: Negative for dysuria, enuresis and hematuria. Musculoskeletal: Negative for arthralgias, gait problem, neck pain and neck stiffness. Skin: Negative for color change and rash. Allergic/Immunologic: Negative for environmental allergies, food allergies and immunocompromised state. Neurological: Negative for dizziness, syncope, facial asymmetry, speech difficulty, light-headedness and headaches. Hematological: Negative for adenopathy. Does not bruise/bleed easily. Psychiatric/Behavioral: Negative for confusion and sleep disturbance. The patient is not nervous/anxious.         Objective:   /82 (Site: Right Upper Arm, Position: Sitting)   Pulse 64   Temp 97.5 °F (36.4 °C) (Oral)   Resp 12   Ht 6' (1.829 m)   Wt 201 lb 12.8 oz (91.5 kg)   BMI 27.37 kg/m²     Physical Exam   Nose: Nose is decongested and anesthetized with topical sprays. Nasal fossa is suctioned bilaterally. Clots are removed. Normal postoperative appearance. Data:  All of the past medical history, past surgical history, family history,social history, allergies and current medications were reviewed with the patient. Assessment & Plan   Diagnoses and all orders for this visit:     Diagnosis Orders   1. Nasal septal deviation     2. Hypertrophy of inferior nasal turbinate     3. Mouth breathing         The findings were explained and his questions were answered. Follow the instructions from yesterday's AVS form regarding continuing the Afrin, and starting the saline irrigations in 3 days. Use NeilMed bottle, their packets, and only distilled water at least twice a day. Flush up one nostril and out the other, leaning forward over sink. Stop the Afrin after 4 more days. Yaima Morris. Agnes Wyatt MD    **This report has been created using voice recognition software. It may contain minor errors which are inherent in voicerecognition technology. **

## 2019-04-02 NOTE — PROGRESS NOTES
Verbal order from Dr. Loli Cruz to anesthetize the patient's nasal cavity. If patient used Afrin as directed at 12:00 pm, proceed with 4% Xylocaine only. After checking the patient's allergy list to confirm no listed medication allergy and verbally asking the patient, the patient's nasal cavity was anesthetized with topical 4% Xylocaine 4 sprays each nostril.

## 2019-04-02 NOTE — OP NOTE
[address] [place] [address]                                OPERATIVE REPORT    PATIENT NAME: Fabiola Emery                      :        1950  MED REC NO:   [id number]                           ROOM:  ACCOUNT NO:   [de-identified]                           ADMIT DATE: 2019  PROVIDER:     Isaac Jiménez. Kaylie Currie M.D.    Veldon Comfort:  2019    OPERATIONS:  Septoplasty, partial submucous resection, left inferior  turbinate. SURGEON:  Isaac Jiménez. Kaylie Currie M.D. ANESTHESIA:  General endotracheal.    PREOPERATIVE DIAGNOSES:  Nasal septal deviation, hypertrophy of left  inferior turbinate. POSTOPERATIVE DIAGNOSES:  Nasal septal deviation, hypertrophy of left  inferior turbinate. HISTORY AND OPERATIVE FINDINGS:  The patient presented with severe  rhinitis medicamentosa because he was trying to keep his borderline  nasal airway opened with medications which then he became refractory to them. Residual airway obstruction persists now with complex septal deviation and hypertrophy  to the left inferior turbinate. The caudal margin of the quadrangular cartilage had  Multiple fractures, was bent sideways on itself, and had sustained obvious severe trauma in the past. This made the dissection more difficult and time-consuming. The rest of  the procedure was uneventful. OPERATIVE PROCEDURE:  After adequate level of general endotracheal  anesthesia had been obtained, the patient's face was prepped and draped  in an aseptic fashion. The nose was decongested, vasoconstricted, and  anesthetized in the usual manner for nasal surgery. The left inferior turbinate was partially submucosally resected using a  turbinator through an anterior entry point. This was fractured  laterally. A left hemitransfixion incision was created and the caudal margin of the  quadrangular cartilage was dissected out.   Multiple fragments were  Of cartilage were meticulously dissected on one side, while preserving mucosal attachment on the opposite side. .  It was also bowed and too tall. The left and right posterior tunnels were then elevated. There was  adequate fenestration on the left side after doing this. Posterior spur  on the left was resected submucosally as well. The prominent left wing of the maxillary crest was trimmed down. Septum  was still buckled to the right anteriorly because it was too tall. The  inferior aspect of the quadrangular cartilage was resected submucosally  a couple of millimeters of height. At this time, the septum could fit  in the midline without tension and with adequate tip support. Horizontal mattress 3-0 chromic sutures were used to anchor the  quadrangular cartilage in the midline. The septal envelope was  reapproximated with a plain 4-0 gut suture with a knot in the end and  tied anteriorly, \"quilting\" the septum back together. Hemitransfixion  incision was closed with this suture as well. Blocks were placed again using 0.5% ropivacaine with epinephrine in the  left sphenopalatine area and anterior ethmoid area. Telfa packs were  placed transiently in the nose. 2 mL of 1% Xylocaine with epinephrine was injected into the  pterygopalatine fossa on each side for postoperative pain relief and  vasoconstriction. The stomach was suctioned of small amount of clear  fluid. Attention was returned to the nasal fossa. Telfa packs were removed. Photographs were taken. The comparison between the pre and postop  photographs was quite striking. Nasopharynx was suctioned of any blood clot. Essentially, no bleeding  at this point, so bacitracin was applied to both sides of the nasal  fossa. The patient was awakened, extubated, and taken to recovery room  in satisfactory condition. There were no complications.         Jonathon Cee M.D.    D: 04/01/2019 11:17:28       T: 04/01/2019 21:36:12 [email or web address]  Job#: [id number]     Doc#: [id number]    CC:  Nolan Macario M.D.

## 2019-04-09 ENCOUNTER — OFFICE VISIT (OUTPATIENT)
Dept: ENT CLINIC | Age: 69
End: 2019-04-09

## 2019-04-09 VITALS
SYSTOLIC BLOOD PRESSURE: 106 MMHG | RESPIRATION RATE: 16 BRPM | HEART RATE: 80 BPM | DIASTOLIC BLOOD PRESSURE: 68 MMHG | BODY MASS INDEX: 26.57 KG/M2 | TEMPERATURE: 97.7 F | HEIGHT: 72 IN | WEIGHT: 196.2 LBS

## 2019-04-09 DIAGNOSIS — J34.3 HYPERTROPHY OF INFERIOR NASAL TURBINATE: ICD-10-CM

## 2019-04-09 DIAGNOSIS — J34.2 NASAL SEPTAL DEVIATION: Primary | ICD-10-CM

## 2019-04-09 PROCEDURE — 99024 POSTOP FOLLOW-UP VISIT: CPT | Performed by: OTOLARYNGOLOGY

## 2019-04-09 ASSESSMENT — ENCOUNTER SYMPTOMS
FACIAL SWELLING: 0
RHINORRHEA: 0
COLOR CHANGE: 0
SINUS PRESSURE: 0
ABDOMINAL PAIN: 0
NAUSEA: 0
CHOKING: 0
APNEA: 0
STRIDOR: 0
COUGH: 0
VOMITING: 0
DIARRHEA: 0
VOICE CHANGE: 0
TROUBLE SWALLOWING: 0
WHEEZING: 0
SORE THROAT: 0
SHORTNESS OF BREATH: 0
CHEST TIGHTNESS: 0

## 2019-04-09 NOTE — PROGRESS NOTES
Verbal order from Dr. Marie Ash to anesthetize the patient's nasal cavity. After checking the patient's allergy list to confirm no listed medication allergy and verbally asking the patient, the patient's nasal cavity was anesthetized with topical 4% Xylocaine 4 sprays each nostril and Gerard-Synephrine 4 sprays each nostril .

## 2019-04-09 NOTE — PROGRESS NOTES
[address], NOSE AND THROAT  [place] [address]  [address]  [address] [address]  Dept: [phone]  Dept Fax: [phone]  Loc: [phone]    Jacquelin Santos is a 76 y.o. male who was referred byNo ref. provider found for:  Chief Complaint   Patient presents with    Post-Op Check     Patient is here post-op septo/SMR 4/1/19   . HPI:     Jacquelin Santos is a 76 y.o. male who presents today for follow-up of his nasal surgery. He is doing well. His nose is a little stuffy. History: Allergies   Allergen Reactions    Seasonal      Current Outpatient Medications   Medication Sig Dispense Refill    ranitidine (ZANTAC) 150 MG tablet Take 1 tablet by mouth 2 times daily 60 tablet 0    ipratropium (ATROVENT) 0.06 % nasal spray 2 sprays by Nasal route 3 times daily 1 Bottle 3    sulfaSALAzine (AZULFIDINE) 500 MG tablet Take 500 mg by mouth 2 times daily      ezetimibe (ZETIA) 10 MG tablet Take 10 mg by mouth daily      lisinopril (PRINIVIL;ZESTRIL) 10 MG tablet Take 10 mg by mouth daily.  Calcium Carbonate (CALCIUM 500 PO) Take  by mouth.  hydroxychloroquine (PLAQUENIL) 200 MG tablet Take  by mouth daily. 2 tab       No current facility-administered medications for this visit.       Past Medical History:   Diagnosis Date    Arthritis     Colon abnormality Aug 2013    \"perforated bowel\" scheduled for colonoscopy    Hyperlipidemia     Hypertension       Past Surgical History:   Procedure Laterality Date    COLECTOMY  9-16-13    robotic LAR with firefly and rigid sigmoidoscopy-Dr. Talia Cruz     COLONOSCOPY  around 2008    COLONOSCOPY  9/4/13    Dr. Paco Coppola Bilateral [id number]    inguinal    KNEE ARTHROSCOPY Right 2013    SEPTOPLASTY Left 4/1/2019    SEPTOPLASTY, SUBMUCOUS RESECT TURBINATESD (SMR), PATRIAL, LEFT INFERIOR, POSSIBLE RIGHT performed by Domingo Scott MD at [address] [place] [address] History   Problem Relation Age of Onset    Arthritis Mother     High Blood Pressure Mother     Cancer Father         prostate    High Blood Pressure Father     Diabetes Brother         borderline     Social History     Tobacco Use    Smoking status: Never Smoker    Smokeless tobacco: Never Used   Substance Use Topics    Alcohol use: No       Subjective:      Review of Systems   Constitutional: Negative for activity change, appetite change, chills, diaphoresis, fatigue, fever and unexpected weight change. HENT: Positive for congestion. Negative for dental problem, ear discharge, ear pain, facial swelling, hearing loss, mouth sores, nosebleeds, postnasal drip, rhinorrhea, sinus pressure, sneezing, sore throat, tinnitus, trouble swallowing and voice change. Eyes: Negative for visual disturbance. Respiratory: Negative for apnea, cough, choking, chest tightness, shortness of breath, wheezing and stridor. Cardiovascular: Negative for chest pain, palpitations and leg swelling. Gastrointestinal: Negative for abdominal pain, diarrhea, nausea and vomiting. Endocrine: Negative for cold intolerance, heat intolerance, polydipsia and polyuria. Genitourinary: Negative for dysuria, enuresis and hematuria. Musculoskeletal: Negative for arthralgias, gait problem, neck pain and neck stiffness. Skin: Negative for color change and rash. Allergic/Immunologic: Negative for environmental allergies, food allergies and immunocompromised state. Neurological: Negative for dizziness, syncope, facial asymmetry, speech difficulty, light-headedness and headaches. Hematological: Negative for adenopathy. Does not bruise/bleed easily. Psychiatric/Behavioral: Negative for confusion and sleep disturbance. The patient is not nervous/anxious.         Objective:   /68 (Site: Right Upper Arm, Position: Sitting)   Pulse 80   Temp 97.7 °F (36.5 °C) (Oral)   Resp 16   Ht 6' (1.829 m)   Wt 196 lb 3.2 oz (89 kg)   BMI 26.61 kg/m²     Physical Exam   Nose: Decongest and anesthetize and some clot and crusting suctioned    Data:  All of the past medical history, past surgical history, family history,social history, allergies and current medications were reviewed with the patient. Assessment & Plan   Diagnoses and all orders for this visit:     Diagnosis Orders   1. Nasal septal deviation     2. Hypertrophy of inferior nasal turbinate         The findings were explained and his questions were answered. Continue irrigations to keep return visit in 2 weeks       Kylah Rodriguez. Ida Arroyo MD    **This report has been created using voice recognition software. It may contain minor errors which are inherent in voicerecognition technology. **

## 2019-04-23 ENCOUNTER — OFFICE VISIT (OUTPATIENT)
Dept: ENT CLINIC | Age: 69
End: 2019-04-23

## 2019-04-23 VITALS
DIASTOLIC BLOOD PRESSURE: 76 MMHG | HEART RATE: 74 BPM | WEIGHT: 202.7 LBS | HEIGHT: 72 IN | RESPIRATION RATE: 14 BRPM | BODY MASS INDEX: 27.45 KG/M2 | SYSTOLIC BLOOD PRESSURE: 124 MMHG | TEMPERATURE: 97.5 F

## 2019-04-23 DIAGNOSIS — J34.3 HYPERTROPHY OF INFERIOR NASAL TURBINATE: ICD-10-CM

## 2019-04-23 DIAGNOSIS — J34.2 NASAL SEPTAL DEVIATION: Primary | ICD-10-CM

## 2019-04-23 PROCEDURE — 99024 POSTOP FOLLOW-UP VISIT: CPT | Performed by: OTOLARYNGOLOGY

## 2019-04-23 ASSESSMENT — ENCOUNTER SYMPTOMS
APNEA: 0
COUGH: 0
DIARRHEA: 0
TROUBLE SWALLOWING: 0
VOMITING: 0
NAUSEA: 0
STRIDOR: 0
COLOR CHANGE: 0
VOICE CHANGE: 0
SINUS PRESSURE: 0
ABDOMINAL PAIN: 0
FACIAL SWELLING: 0
CHOKING: 0
CHEST TIGHTNESS: 0
WHEEZING: 0
RHINORRHEA: 0
SHORTNESS OF BREATH: 0
SORE THROAT: 0

## 2019-04-23 NOTE — LETTER
[address] [facility] and [address] [place], [place] [address]  [address]  Phone: [phone]  Fax: [phone]    Stephanie Nash MD        May 13, 2019    Nanette Rubin, [address] [place], [place] [id number]    Patient: Tawanda Duarte   MR Number: [id number]   YOB: 1950   Date of Visit: 4/23/2019     Dear Nanette Rubin,    I recently saw your patient. Below are the relevant portions of my assessment and plan of care. Assessment & Plan   Diagnoses and all orders for this visit:    The findings were explained and his questions were answered. No follow-ups on file. If you have questions, please do not hesitate to call me. I look forward to following Russel Murillo along with you.     Sincerely,          Stephanie Nash MD

## 2019-04-23 NOTE — PROGRESS NOTES
[address], NOSE AND THROAT  [place] [address]  [address]  [address] [address]  Dept: [phone]  Dept Fax: [phone]  Loc: [phone]    Nigel Marnielli is a 76 y.o. male who was referred byNo ref. provider found for:  Chief Complaint   Patient presents with    Post-Op Check     Pt. returns for P-OP check. Pt. is having trouble breathing through his nose at [facility] HPI:     Nigel Marinelli is a 76 y.o. male who presents today for follow-up on septoplasty and partial submucous resection left inferior turbinate. He has no still feels stuffy and he uses Breathe Right strips at night. He uses ipratropium spray every morning and has a history of rhinitis medicamentosa. History: Allergies   Allergen Reactions    Seasonal      Current Outpatient Medications   Medication Sig Dispense Refill    ranitidine (ZANTAC) 150 MG tablet Take 1 tablet by mouth 2 times daily 60 tablet 0    ipratropium (ATROVENT) 0.06 % nasal spray 2 sprays by Nasal route 3 times daily 1 Bottle 3    sulfaSALAzine (AZULFIDINE) 500 MG tablet Take 500 mg by mouth 2 times daily      ezetimibe (ZETIA) 10 MG tablet Take 10 mg by mouth daily      lisinopril (PRINIVIL;ZESTRIL) 10 MG tablet Take 10 mg by mouth daily.  Calcium Carbonate (CALCIUM 500 PO) Take  by mouth.  hydroxychloroquine (PLAQUENIL) 200 MG tablet Take  by mouth daily. 2 tab       No current facility-administered medications for this visit.       Past Medical History:   Diagnosis Date    Arthritis     Colon abnormality Aug 2013    \"perforated bowel\" scheduled for colonoscopy    Hyperlipidemia     Hypertension       Past Surgical History:   Procedure Laterality Date    COLECTOMY  9-16-13    robotic LAR with firefly and rigid sigmoidoscopy-Dr. Evy Foss     COLONOSCOPY  around 2008    COLONOSCOPY  9/4/13    Dr. Melida Alvarenga Bilateral [id number]    inguinal    KNEE ARTHROSCOPY Right 2013    SEPTOPLASTY Left 4/1/2019    SEPTOPLASTY, SUBMUCOUS RESECT TURBINATESD (SMR), PATRIAL, LEFT INFERIOR, POSSIBLE RIGHT performed by Yoly Mejia MD at [facility] History   Problem Relation Age of Onset    Arthritis Mother     High Blood Pressure Mother     Cancer Father         prostate    High Blood Pressure Father     Diabetes Brother         borderline     Social History     Tobacco Use    Smoking status: Never Smoker    Smokeless tobacco: Never Used   Substance Use Topics    Alcohol use: No       Subjective:      Review of Systems   Constitutional: Negative for activity change, appetite change, chills, diaphoresis, fatigue, fever and unexpected weight change. HENT: Negative for congestion, dental problem, ear discharge, ear pain, facial swelling, hearing loss, mouth sores, nosebleeds, postnasal drip, rhinorrhea, sinus pressure, sneezing, sore throat, tinnitus, trouble swallowing and voice change. Eyes: Negative for visual disturbance. Respiratory: Negative for apnea, cough, choking, chest tightness, shortness of breath, wheezing and stridor. Cardiovascular: Negative for chest pain, palpitations and leg swelling. Gastrointestinal: Negative for abdominal pain, diarrhea, nausea and vomiting. Endocrine: Negative for cold intolerance, heat intolerance, polydipsia and polyuria. Genitourinary: Negative for dysuria, enuresis and hematuria. Musculoskeletal: Negative for arthralgias, gait problem, neck pain and neck stiffness. Skin: Negative for color change and rash. Allergic/Immunologic: Negative for environmental allergies, food allergies and immunocompromised state. Neurological: Negative for dizziness, syncope, facial asymmetry, speech difficulty, light-headedness and headaches. Hematological: Negative for adenopathy. Does not bruise/bleed easily. Psychiatric/Behavioral: Negative for confusion and sleep disturbance. The patient is not nervous/anxious.         Objective:   /76 (Site: Left Upper Arm, Position: Sitting)   Pulse 74   Temp 97.5 °F (36.4 °C) (Oral)   Resp 14   Ht 6' (1.829 m)   Wt 202 lb 11.2 oz (91.9 kg)   BMI 27.49 kg/m²     Physical Exam  Nose: Septum is straight and healing normally. The nasal passage appears adequate, left tighter than the right. Data:  All of the past medical history, past surgical history, family history,social history, allergies and current medications were reviewed with the patient. Assessment & Plan   Diagnoses and all orders for this visit:     Diagnosis Orders   1. Nasal septal deviation     2. Hypertrophy of inferior nasal turbinate         The findings were explained and his questions were answered. It appears to me that his airway will be okay, we just need to wait for him to heal more fully and allow swelling to decrease. He should continue the irrigations. He may use the ipratropium at night when necessary. He is back on all his medications. He should finish the ranitidine prevent swelling from the lisinopril. At this point, all we can do is allow the swelling to go down and see where we are with the airway. This preoperatively was asymmetrical and perceived as symmetrical, and it can be a while before that feeling of asymmetry with a more symmetrical airway disappears    Return in about 1 month (around 5/23/2019) for Post-Op Check. Robie El. Slater Lesch, MD    **This report has been created using voice recognition software. It may contain minor errors which are inherent in voicerecognition technology. **

## 2019-05-23 ENCOUNTER — OFFICE VISIT (OUTPATIENT)
Dept: ENT CLINIC | Age: 69
End: 2019-05-23

## 2019-05-23 VITALS
RESPIRATION RATE: 12 BRPM | WEIGHT: 200 LBS | SYSTOLIC BLOOD PRESSURE: 117 MMHG | TEMPERATURE: 97.6 F | BODY MASS INDEX: 27.12 KG/M2 | HEART RATE: 68 BPM | DIASTOLIC BLOOD PRESSURE: 72 MMHG

## 2019-05-23 DIAGNOSIS — T48.5X5A RHINITIS MEDICAMENTOSA: ICD-10-CM

## 2019-05-23 DIAGNOSIS — J31.0 RHINITIS MEDICAMENTOSA: ICD-10-CM

## 2019-05-23 DIAGNOSIS — R68.2 DRY MOUTH: ICD-10-CM

## 2019-05-23 DIAGNOSIS — J34.2 NASAL SEPTAL DEVIATION: Primary | ICD-10-CM

## 2019-05-23 DIAGNOSIS — Z98.890 STATUS POST SURGERY: ICD-10-CM

## 2019-05-23 DIAGNOSIS — J34.3 HYPERTROPHY OF INFERIOR NASAL TURBINATE: ICD-10-CM

## 2019-05-23 DIAGNOSIS — R06.5 MOUTH BREATHING: ICD-10-CM

## 2019-05-23 DIAGNOSIS — R09.81 NASAL CONGESTION: ICD-10-CM

## 2019-05-23 PROCEDURE — 99024 POSTOP FOLLOW-UP VISIT: CPT | Performed by: OTOLARYNGOLOGY

## 2019-05-23 ASSESSMENT — ENCOUNTER SYMPTOMS
COLOR CHANGE: 0
ABDOMINAL PAIN: 0
SINUS PRESSURE: 0
VOMITING: 0
SORE THROAT: 0
RHINORRHEA: 0
TROUBLE SWALLOWING: 0
CHOKING: 0
DIARRHEA: 0
NAUSEA: 0
VOICE CHANGE: 0
APNEA: 0
STRIDOR: 0
SHORTNESS OF BREATH: 0
COUGH: 0
FACIAL SWELLING: 0
WHEEZING: 0
CHEST TIGHTNESS: 0

## 2019-05-23 NOTE — LETTER
[address] [facility] [address] and [address] [address] [place] [address] [address]  [address]  Phone: [phone]  Fax: [phone]    Virginia Rodgers MD        May 23, 2019    [address]  [facility]    Patient: Akhil Garcia   MR Number: [id number]   YOB: 1950   Date of Visit: 5/23/2019     Dear Christ Fuentes,    I recently saw your patient, Akhil Garcia, regarding follow-up on his nasal surgery. His airway is now excellent and he is reported to no longer snore. He is still tired though. Below are the relevant portions of my assessment and plan of care. Assessment & Plan   Diagnoses and all orders for this visit:     Diagnosis Orders   1. Nasal septal deviation     2. Hypertrophy of inferior nasal turbinate     3. Mouth breathing     4. Dry mouth     5. Nasal congestion     6. Rhinitis medicamentosa     7. Status post surgery         The findings were explained and his questions were answered. Options were discussed including. Irrigate daily for 2 Months. He agreed. May return to clinic as needed. If you have questions, please do not hesitate to call me. I look forward to following Aung Lay along with you.     Sincerely,          Virginia Rodgers MD

## 2019-05-23 NOTE — PROGRESS NOTES
[address] [facility], [facility]  [place] [address]  [address]  [address] [address]  Dept: [phone]  Dept Fax: [phone]  Loc: [phone]    Melony Rider is a 76 y.o. male who was referred byNo ref. provider found for:  Chief Complaint   Patient presents with   [address] [place] [address] [facility] Post-Op Check     Patient here for post op exam, septo/SMR 4/1/19. [facility] HPI:     Melony Rider is a 76 y.o. male who presents today for post-op exam Septoplasty, partial submucous resection, left inferior  Turbinate on 4/1/19. He is doing well. He is breathing better. He is sleeping better. He is happy hi did surgery. He does not have sleep apnea. He does not snore. He is continually feeling better. History: Allergies   Allergen Reactions    Seasonal      Current Outpatient Medications   Medication Sig Dispense Refill    ipratropium (ATROVENT) 0.06 % nasal spray 2 sprays by Nasal route 3 times daily 1 Bottle 3    sulfaSALAzine (AZULFIDINE) 500 MG tablet Take 500 mg by mouth 2 times daily      ezetimibe (ZETIA) 10 MG tablet Take 10 mg by mouth daily      lisinopril (PRINIVIL;ZESTRIL) 10 MG tablet Take 10 mg by mouth daily.  Calcium Carbonate (CALCIUM 500 PO) Take  by mouth.  hydroxychloroquine (PLAQUENIL) 200 MG tablet Take  by mouth daily. 2 tab      ranitidine (ZANTAC) 150 MG tablet Take 1 tablet by mouth 2 times daily 60 tablet 0     No current facility-administered medications for this visit.       Past Medical History:   Diagnosis Date    Arthritis     Colon abnormality Aug 2013    \"perforated bowel\" scheduled for colonoscopy    Hyperlipidemia     Hypertension       Past Surgical History:   Procedure Laterality Date    COLECTOMY  9-16-13    robotic LAR with firefly and rigid sigmoidoscopy-Dr. Sellers Mins     COLONOSCOPY  around 2008    COLONOSCOPY  9/4/13    Dr. Alex Dick Bilateral [id number]    inguinal    KNEE ARTHROSCOPY Right 2013    SEPTOPLASTY Left 4/1/2019    SEPTOPLASTY, SUBMUCOUS RESECT TURBINATESD (SMR), PATRIAL, LEFT INFERIOR, POSSIBLE RIGHT performed by Jennie Rizo MD at [address] [place] [address] History   Problem Relation Age of Onset    Arthritis Mother     High Blood Pressure Mother     Cancer Father         prostate    High Blood Pressure Father     Diabetes Brother         borderline     Social History     Tobacco Use    Smoking status: Never Smoker    Smokeless tobacco: Never Used   Substance Use Topics    Alcohol use: No       Subjective:       Review of Systems   Constitutional: Negative for activity change, appetite change, chills, diaphoresis, fatigue, fever and unexpected weight change. HENT: Negative for congestion, dental problem, ear discharge, ear pain, facial swelling, hearing loss, mouth sores, nosebleeds, postnasal drip, rhinorrhea, sinus pressure, sneezing, sore throat, tinnitus, trouble swallowing and voice change. Eyes: Negative for visual disturbance. Respiratory: Negative for apnea, cough, choking, chest tightness, shortness of breath, wheezing and stridor. Cardiovascular: Negative for chest pain, palpitations and leg swelling. Gastrointestinal: Negative for abdominal pain, diarrhea, nausea and vomiting. Endocrine: Negative for cold intolerance, heat intolerance, polydipsia and polyuria. Genitourinary: Negative for dysuria, enuresis and hematuria. Musculoskeletal: Negative for arthralgias, gait problem, neck pain and neck stiffness. Skin: Negative for color change and rash. Allergic/Immunologic: Negative for environmental allergies, food allergies and immunocompromised state. Neurological: Negative for dizziness, syncope, facial asymmetry, speech difficulty, light-headedness and headaches. Hematological: Negative for adenopathy. Does not bruise/bleed easily. Psychiatric/Behavioral: Negative for confusion and sleep disturbance. The patient is not nervous/anxious.         Objective: /72 (Site: Right Upper Arm, Position: Sitting)   Pulse 68   Temp 97.6 °F (36.4 °C) (Oral)   Resp 12   Wt 200 lb (90.7 kg)   BMI 27.12 kg/m²     Physical Exam   Nose: Excellent result. Great airway and all services are healing normally. Data:  All of the past medical history, past surgical history, family history,social history, allergies and current medications were reviewed with the patient. Assessment & Plan   Diagnoses and all orders for this visit:     Diagnosis Orders   1. Nasal septal deviation     2. Hypertrophy of inferior nasal turbinate     3. Mouth breathing     4. Dry mouth     5. Nasal congestion     6. Rhinitis medicamentosa     7. Status post surgery         The findings were explained and his questions were answered. Options were discussed including. Irrigate daily for 2 Months. He agreed. May return to clinic as needed. Rian MEEKS CMA (YARIEL), am scribing for, and in the presence of Dr. Daniel Malcolm. Electronically signed by [facility]) on 5/23/19 at 9:35 AM.     (Please note that portions of this note were completed with a voice recognition program. Efforts were made to edit the dictations butoccasionally words are mis-transcribed.)    I agree to the above documentation placed by my scribe. I have personally evaluated this patient. Additional findings are as noted. I reviewed the scribe's note and agree with the documented findings and plan of care. Any areas of disagreement are corrected. I agree with the chief complaint, past medical history, past surgical history, allergies, medications, social and family history as documented unless otherwise noted below.      Electronically signed by Virginia Rodgers MD on 5/23/2019 at 10:19 PM

## 2019-12-24 ENCOUNTER — HOSPITAL ENCOUNTER (OUTPATIENT)
Age: 69
Discharge: HOME | End: 2019-12-24
Payer: MEDICARE

## 2019-12-24 ENCOUNTER — HOSPITAL ENCOUNTER (OUTPATIENT)
Dept: GENERAL RADIOLOGY | Age: 69
Discharge: HOME | End: 2019-12-24
Payer: MEDICARE

## 2019-12-24 DIAGNOSIS — Z79.899 ENCOUNTER FOR LONG-TERM CURRENT USE OF MEDICATION: ICD-10-CM

## 2019-12-24 DIAGNOSIS — M06.4 INFLAMMATORY POLYARTHRITIS (HCC): ICD-10-CM

## 2019-12-24 DIAGNOSIS — M15.8 OTHER OSTEOARTHRITIS INVOLVING MULTIPLE JOINTS: ICD-10-CM

## 2019-12-24 DIAGNOSIS — M85.80 OSTEOPENIA, UNSPECIFIED LOCATION: ICD-10-CM

## 2019-12-24 PROCEDURE — 71046 X-RAY EXAM CHEST 2 VIEWS: CPT

## 2020-06-09 ENCOUNTER — HOSPITAL ENCOUNTER (OUTPATIENT)
Dept: GENERAL RADIOLOGY | Age: 70
Discharge: HOME | End: 2020-06-09
Payer: MEDICARE

## 2020-06-09 ENCOUNTER — HOSPITAL ENCOUNTER (OUTPATIENT)
Age: 70
Discharge: HOME | End: 2020-06-09
Payer: MEDICARE

## 2020-06-09 PROCEDURE — 72100 X-RAY EXAM L-S SPINE 2/3 VWS: CPT

## 2020-06-12 ENCOUNTER — HOSPITAL ENCOUNTER (OUTPATIENT)
Dept: MRI IMAGING | Age: 70
Discharge: HOME | End: 2020-06-12
Payer: MEDICARE

## 2020-06-12 PROCEDURE — 72148 MRI LUMBAR SPINE W/O DYE: CPT

## 2020-06-27 ENCOUNTER — HOSPITAL ENCOUNTER (OUTPATIENT)
Age: 70
Discharge: HOME | End: 2020-06-27
Payer: MEDICARE

## 2020-06-27 PROCEDURE — U0002 COVID-19 LAB TEST NON-CDC: HCPCS

## 2020-06-28 LAB
PERFORMING LAB: NORMAL
REPORT: NORMAL
SARS-COV-2: NOT DETECTED

## 2020-07-01 RX ORDER — BUPIVACAINE HYDROCHLORIDE 2.5 MG/ML
5 INJECTION, SOLUTION EPIDURAL; INFILTRATION; INTRACAUDAL ONCE
Status: CANCELLED | OUTPATIENT
Start: 2020-07-01 | End: 2020-07-01

## 2020-07-02 ENCOUNTER — HOSPITAL ENCOUNTER (OUTPATIENT)
Dept: INTERVENTIONAL RADIOLOGY/VASCULAR | Age: 70
Discharge: HOME | End: 2020-07-02
Payer: MEDICARE

## 2020-07-02 VITALS
OXYGEN SATURATION: 98 % | RESPIRATION RATE: 16 BRPM | DIASTOLIC BLOOD PRESSURE: 71 MMHG | TEMPERATURE: 98.2 F | SYSTOLIC BLOOD PRESSURE: 129 MMHG | HEART RATE: 84 BPM

## 2020-07-02 PROCEDURE — 62323 NJX INTERLAMINAR LMBR/SAC: CPT

## 2020-07-02 PROCEDURE — 2709999900 HC NON-CHARGEABLE SUPPLY

## 2020-07-02 PROCEDURE — 6360000002 HC RX W HCPCS

## 2020-07-02 PROCEDURE — 6360000004 HC RX CONTRAST MEDICATION: Performed by: RADIOLOGY

## 2020-07-02 PROCEDURE — 2500000003 HC RX 250 WO HCPCS

## 2020-07-02 RX ORDER — BUPIVACAINE HYDROCHLORIDE 2.5 MG/ML
5 INJECTION, SOLUTION EPIDURAL; INFILTRATION; INTRACAUDAL ONCE
Status: COMPLETED | OUTPATIENT
Start: 2020-07-02 | End: 2020-07-02

## 2020-07-02 RX ORDER — CYCLOBENZAPRINE HCL 10 MG
10 TABLET ORAL
COMMUNITY
End: 2021-02-08

## 2020-07-02 RX ORDER — DEXAMETHASONE SODIUM PHOSPHATE 4 MG/ML
4 INJECTION, SOLUTION INTRA-ARTICULAR; INTRALESIONAL; INTRAMUSCULAR; INTRAVENOUS; SOFT TISSUE ONCE
Status: COMPLETED | OUTPATIENT
Start: 2020-07-02 | End: 2020-07-02

## 2020-07-02 RX ADMIN — BUPIVACAINE HYDROCHLORIDE 2 ML: 2.5 INJECTION, SOLUTION EPIDURAL; INFILTRATION; INTRACAUDAL at 13:49

## 2020-07-02 RX ADMIN — IOHEXOL 1 ML: 180 INJECTION INTRAVENOUS at 13:49

## 2020-07-02 RX ADMIN — DEXAMETHASONE SODIUM PHOSPHATE 4 MG: 4 INJECTION, SOLUTION INTRA-ARTICULAR; INTRALESIONAL; INTRAMUSCULAR; INTRAVENOUS; SOFT TISSUE at 13:49

## 2020-07-02 ASSESSMENT — PAIN DESCRIPTION - DESCRIPTORS: DESCRIPTORS: BURNING

## 2020-07-02 ASSESSMENT — PAIN SCALES - GENERAL
PAINLEVEL_OUTOF10: 0
PAINLEVEL_OUTOF10: 5
PAINLEVEL_OUTOF10: 0

## 2020-07-02 ASSESSMENT — PAIN - FUNCTIONAL ASSESSMENT: PAIN_FUNCTIONAL_ASSESSMENT: 0-10

## 2020-07-02 NOTE — PROGRESS NOTES
1000 [facility] INJECTION SITE SOFT AND DRY. DENIES PAIN OR NUMBNESS. LEG MOVEMENT GOOD. FAMILY AT BEDSIDE. SNACK TAKEN  1430 INJECTION SITE SOFT AND DRY. NO PAIN OR NUMBNESS. HOME INSTRUCTIONS TO MPT AND WIFE VERBALIZED UNDERSTANDING,  1431 DISCHARGED AMBULATORY STABLE HOME.

## 2020-07-02 NOTE — OP NOTE
Department of Radiology  Post Procedure Progress Note    Pre-Procedure Diagnosis:  Lumbar radiculopathy     Procedure Performed:  Epidural block/steroid injection      Anesthesia: local     Findings: successful    Immediate Complications:  None    Estimated Blood Loss: minimal    SEE DICTATED PROCEDURE NOTE FOR COMPLETE DETAILS.       Electronically signed by Doc Carpenter MD on 7/2/2020 at 1:52 PM

## 2020-07-02 NOTE — PROGRESS NOTES
1335 Patient received in IR for lumbar epidural injection. 1340 This procedure has been fully reviewed with the patient and written informed consent has been obtained. 1343 Procedure started with Dr. Padmini Bernabe. 1350 Procedure completed; patient tolerated well. Band aid to lower back; no bleeding noted. 1352 Patient on cart; comfort ensured. 1355 Patient taken to OPN via cart.

## 2020-07-02 NOTE — PROGRESS NOTES
[address] ADMITTED FOR NERVE BLOCK PROCEDURE REVIEWED WITH PT VERBALIZED UNDERSTANDING. Pt rights and responsibilities offered to pt to read. PEDAL PUSH AND PULL STRONG AND EQUAL BILATERALLY.     _MET___ Safety:       (Environmental)   Rice to environment   Ensure ID band is correct and in place/ allergy band as needed   Assess for fall risk   Initiate fall precautions as applicable (fall band, side rails, etc.)   Call light within reach   Bed in low position/ wheels locked    __MET__ Pain:        Assess pain level and characteristics   Administer analgesics as ordered   Assess effectiveness of pain management and report to MD as needed    _MET___ Knowledge Deficit:   Assess baseline knowledge   Provide teaching at level of understanding   Provide teaching via preferred learning method   Evaluate teaching effectiveness    __MET__ Hemodynamic/Respiratory Status:       (Pre and Post Procedure Monitoring)   Assess/Monitor vital signs and LOC   Assess Baseline SpO2 prior to any sedation   Obtain weight/height   Assess vital signs/ LOC until patient meets discharge criteria   Monitor procedure site and notify MD of any issues

## 2020-08-15 ENCOUNTER — HOSPITAL ENCOUNTER (OUTPATIENT)
Age: 70
Discharge: HOME | End: 2020-08-15
Payer: MEDICARE

## 2020-08-15 PROCEDURE — U0003 INFECTIOUS AGENT DETECTION BY NUCLEIC ACID (DNA OR RNA); SEVERE ACUTE RESPIRATORY SYNDROME CORONAVIRUS 2 (SARS-COV-2) (CORONAVIRUS DISEASE [COVID-19]), AMPLIFIED PROBE TECHNIQUE, MAKING USE OF HIGH THROUGHPUT TECHNOLOGIES AS DESCRIBED BY CMS-2020-01-R: HCPCS

## 2020-08-17 LAB — SARS-COV-2: NOT DETECTED

## 2020-08-19 RX ORDER — BUPIVACAINE HYDROCHLORIDE 2.5 MG/ML
5 INJECTION, SOLUTION EPIDURAL; INFILTRATION; INTRACAUDAL ONCE
Status: CANCELLED | OUTPATIENT
Start: 2020-08-19 | End: 2020-08-19

## 2020-08-19 RX ORDER — DEXAMETHASONE SODIUM PHOSPHATE 4 MG/ML
4 INJECTION, SOLUTION INTRA-ARTICULAR; INTRALESIONAL; INTRAMUSCULAR; INTRAVENOUS; SOFT TISSUE ONCE
Status: CANCELLED | OUTPATIENT
Start: 2020-08-19 | End: 2020-08-19

## 2020-08-20 ENCOUNTER — HOSPITAL ENCOUNTER (OUTPATIENT)
Dept: INTERVENTIONAL RADIOLOGY/VASCULAR | Age: 70
Discharge: HOME | End: 2020-08-20
Payer: MEDICARE

## 2020-08-20 VITALS
DIASTOLIC BLOOD PRESSURE: 66 MMHG | SYSTOLIC BLOOD PRESSURE: 132 MMHG | RESPIRATION RATE: 16 BRPM | OXYGEN SATURATION: 100 % | TEMPERATURE: 98.2 F | HEART RATE: 78 BPM

## 2020-08-20 PROCEDURE — 62323 NJX INTERLAMINAR LMBR/SAC: CPT | Performed by: RADIOLOGY

## 2020-08-20 PROCEDURE — 2709999900 HC NON-CHARGEABLE SUPPLY

## 2020-08-20 PROCEDURE — 6360000004 HC RX CONTRAST MEDICATION: Performed by: RADIOLOGY

## 2020-08-20 PROCEDURE — 6360000002 HC RX W HCPCS

## 2020-08-20 PROCEDURE — 2500000003 HC RX 250 WO HCPCS

## 2020-08-20 RX ORDER — DEXAMETHASONE SODIUM PHOSPHATE 4 MG/ML
4 INJECTION, SOLUTION INTRA-ARTICULAR; INTRALESIONAL; INTRAMUSCULAR; INTRAVENOUS; SOFT TISSUE ONCE
Status: COMPLETED | OUTPATIENT
Start: 2020-08-20 | End: 2020-08-20

## 2020-08-20 RX ORDER — BUPIVACAINE HYDROCHLORIDE 2.5 MG/ML
5 INJECTION, SOLUTION EPIDURAL; INFILTRATION; INTRACAUDAL ONCE
Status: COMPLETED | OUTPATIENT
Start: 2020-08-20 | End: 2020-08-20

## 2020-08-20 RX ADMIN — IOHEXOL 1 ML: 180 INJECTION INTRAVENOUS at 14:24

## 2020-08-20 RX ADMIN — BUPIVACAINE HYDROCHLORIDE 5 MG: 2.5 INJECTION, SOLUTION EPIDURAL; INFILTRATION; INTRACAUDAL at 14:24

## 2020-08-20 RX ADMIN — DEXAMETHASONE SODIUM PHOSPHATE 4 MG: 4 INJECTION, SOLUTION INTRA-ARTICULAR; INTRALESIONAL; INTRAMUSCULAR; INTRAVENOUS; SOFT TISSUE at 14:24

## 2020-08-20 ASSESSMENT — PAIN SCALES - GENERAL: PAINLEVEL_OUTOF10: 2

## 2020-08-20 ASSESSMENT — PAIN - FUNCTIONAL ASSESSMENT: PAIN_FUNCTIONAL_ASSESSMENT: 0-10

## 2020-08-20 NOTE — PROGRESS NOTES
1211 PT ARRIVES TO \[facility]\"" FOR NERVE BLOCK. NO BLOOD THINNERS   1212 PATIENT RIGHTS AND RESPONSIBILITIES SHEET OFFERED TO PT TO READ. 1215 ADVISED PT OF LATE ARRIVAL, AND SPECIAL PROCEDURE WILL ATTEMPT TO COMPLETE TESTING. ADVISED WILL NOT BE BEFORE 1330  1230 REFRESHMENTS TO FAMILY, CALL LIGHT IN REACH  1330 UPDATED ON PROCEDURE TIME  1415 TO SPECIALS VIA CART  1435 returns to OPN post procedure. Hand grasp, pedal push and pull equal and strong.  Mid lower back bandaid clean and dry  1445 _m___ Safety:       (Environmental)   Eunice to environment   Ensure ID band is correct and in place/ allergy band as needed   Assess for fall risk   Initiate fall precautions as applicable (fall band, side rails, etc.)   Call light within reach   Bed in low position/ wheels locked    _m___ Pain:        Assess pain level and characteristics   Administer analgesics as ordered   Assess effectiveness of pain management and report to MD as needed    __m__ Knowledge Deficit:   Assess baseline knowledge   Provide teaching at level of understanding   Provide teaching via preferred learning method   Evaluate teaching effectiveness    __m__ Hemodynamic/Respiratory Status:       (Pre and Post Procedure Monitoring)   Assess/Monitor vital signs and LOC   Assess Baseline SpO2 prior to any sedation   Obtain weight/height   Assess vital signs/ LOC until patient meets discharge criteria   Monitor procedure site and notify MD of any issues    _1500 up in room, gait steady  1505 WRITTEN DISCHARGE INSTRUCTIONS GIVEN TO PT-VERBALIZES UNDERSTANDING  1510 PT DISCHARGED AMBULATORY IN SATISFACTORY CONDITION

## 2020-08-20 NOTE — PROGRESS NOTES
1413 Pt arrived in IR. Skin pink, warm, dry. Respirations even and unlabored at rest. C/O pain to bilateral lower back, down legs, left greater than right, 2/10 at this time. C/o slight numbness to left leg.   1422 Procedure started with Dr. Frankey Hinds  1425 Procedure complete. Pt tolerated well. Bandaid to lower back puncture site. 1433 Pt transported to [facility] via cart. Skin pink, warm, dry. Respirations even and unlabored. Pain 1/10 . Pedal push/pull equal and strong. Slight numbness to left leg, denies numbness/tingling to RLE.

## 2020-11-16 ENCOUNTER — HOSPITAL ENCOUNTER (OUTPATIENT)
Age: 70
Discharge: HOME | End: 2020-11-16
Payer: MEDICARE

## 2020-11-16 ENCOUNTER — HOSPITAL ENCOUNTER (OUTPATIENT)
Dept: GENERAL RADIOLOGY | Age: 70
Discharge: HOME | End: 2020-11-16
Payer: MEDICARE

## 2020-11-16 LAB
ANION GAP SERPL CALCULATED.3IONS-SCNC: 12 MEQ/L (ref 8–16)
APTT: 26.4 SECONDS (ref 22–38)
BUN BLDV-MCNC: 14 MG/DL (ref 7–22)
CALCIUM SERPL-MCNC: 9.9 MG/DL (ref 8.5–10.5)
CHLORIDE BLD-SCNC: 103 MEQ/L (ref 98–111)
CO2: 24 MEQ/L (ref 23–33)
CREAT SERPL-MCNC: 0.9 MG/DL (ref 0.4–1.2)
EKG ATRIAL RATE: 61 BPM
EKG P AXIS: 52 DEGREES
EKG P-R INTERVAL: 150 MS
EKG Q-T INTERVAL: 438 MS
EKG QRS DURATION: 128 MS
EKG QTC CALCULATION (BAZETT): 440 MS
EKG R AXIS: -8 DEGREES
EKG T AXIS: 47 DEGREES
EKG VENTRICULAR RATE: 61 BPM
ERYTHROCYTE [DISTWIDTH] IN BLOOD BY AUTOMATED COUNT: 12.7 % (ref 11.5–14.5)
ERYTHROCYTE [DISTWIDTH] IN BLOOD BY AUTOMATED COUNT: 44.8 FL (ref 35–45)
GFR SERPL CREATININE-BSD FRML MDRD: 83 ML/MIN/1.73M2
GLUCOSE BLD-MCNC: 107 MG/DL (ref 70–108)
HCT VFR BLD CALC: 49 % (ref 42–52)
HEMOGLOBIN: 16.4 GM/DL (ref 14–18)
INR BLD: 1.03 (ref 0.85–1.13)
MCH RBC QN AUTO: 32.3 PG (ref 26–33)
MCHC RBC AUTO-ENTMCNC: 33.5 GM/DL (ref 32.2–35.5)
MCV RBC AUTO: 96.6 FL (ref 80–94)
MRSA NASAL SCREEN RT-PCR: NEGATIVE
PLATELET # BLD: 255 THOU/MM3 (ref 130–400)
PMV BLD AUTO: 9.5 FL (ref 9.4–12.4)
POTASSIUM SERPL-SCNC: 4.5 MEQ/L (ref 3.5–5.2)
RBC # BLD: 5.07 MILL/MM3 (ref 4.7–6.1)
SODIUM BLD-SCNC: 139 MEQ/L (ref 135–145)
STAPH AUREUS SCREEN RT-PCR: NEGATIVE
WBC # BLD: 5.8 THOU/MM3 (ref 4.8–10.8)

## 2020-11-16 PROCEDURE — 36415 COLL VENOUS BLD VENIPUNCTURE: CPT

## 2020-11-16 PROCEDURE — 87641 MR-STAPH DNA AMP PROBE: CPT

## 2020-11-16 PROCEDURE — 71046 X-RAY EXAM CHEST 2 VIEWS: CPT

## 2020-11-16 PROCEDURE — 87640 STAPH A DNA AMP PROBE: CPT

## 2020-11-16 PROCEDURE — 80048 BASIC METABOLIC PNL TOTAL CA: CPT

## 2020-11-16 PROCEDURE — 85027 COMPLETE CBC AUTOMATED: CPT

## 2020-11-16 PROCEDURE — 85610 PROTHROMBIN TIME: CPT

## 2020-11-16 PROCEDURE — 87081 CULTURE SCREEN ONLY: CPT

## 2020-11-16 PROCEDURE — 85730 THROMBOPLASTIN TIME PARTIAL: CPT

## 2020-11-16 PROCEDURE — 93005 ELECTROCARDIOGRAM TRACING: CPT | Performed by: ORTHOPAEDIC SURGERY

## 2020-11-18 LAB — MRSA SCREEN: NORMAL

## 2020-12-04 ENCOUNTER — HOSPITAL ENCOUNTER (OUTPATIENT)
Age: 70
Discharge: HOME | End: 2020-12-04
Payer: MEDICARE

## 2020-12-04 LAB
ABO: NORMAL
ANTIBODY SCREEN: NORMAL
RH FACTOR: NORMAL

## 2020-12-04 PROCEDURE — U0003 INFECTIOUS AGENT DETECTION BY NUCLEIC ACID (DNA OR RNA); SEVERE ACUTE RESPIRATORY SYNDROME CORONAVIRUS 2 (SARS-COV-2) (CORONAVIRUS DISEASE [COVID-19]), AMPLIFIED PROBE TECHNIQUE, MAKING USE OF HIGH THROUGHPUT TECHNOLOGIES AS DESCRIBED BY CMS-2020-01-R: HCPCS

## 2020-12-04 PROCEDURE — 86901 BLOOD TYPING SEROLOGIC RH(D): CPT

## 2020-12-04 PROCEDURE — 36415 COLL VENOUS BLD VENIPUNCTURE: CPT

## 2020-12-04 PROCEDURE — 86900 BLOOD TYPING SEROLOGIC ABO: CPT

## 2020-12-04 PROCEDURE — 86850 RBC ANTIBODY SCREEN: CPT

## 2020-12-06 LAB — SARS-COV-2: NOT DETECTED

## 2021-01-04 ENCOUNTER — HOSPITAL ENCOUNTER (OUTPATIENT)
Age: 71
Discharge: HOME | End: 2021-01-04
Payer: MEDICARE

## 2021-01-04 LAB
MRSA NASAL SCREEN RT-PCR: NEGATIVE
STAPH AUREUS SCREEN RT-PCR: NEGATIVE

## 2021-01-04 PROCEDURE — 87081 CULTURE SCREEN ONLY: CPT

## 2021-01-04 PROCEDURE — U0003 INFECTIOUS AGENT DETECTION BY NUCLEIC ACID (DNA OR RNA); SEVERE ACUTE RESPIRATORY SYNDROME CORONAVIRUS 2 (SARS-COV-2) (CORONAVIRUS DISEASE [COVID-19]), AMPLIFIED PROBE TECHNIQUE, MAKING USE OF HIGH THROUGHPUT TECHNOLOGIES AS DESCRIBED BY CMS-2020-01-R: HCPCS

## 2021-01-04 PROCEDURE — 87641 MR-STAPH DNA AMP PROBE: CPT

## 2021-01-04 PROCEDURE — 87640 STAPH A DNA AMP PROBE: CPT

## 2021-01-05 LAB
PERFORMING LAB: ABNORMAL
REPORT: ABNORMAL
SARS-COV-2: DETECTED

## 2021-01-06 LAB — MRSA SCREEN: NORMAL

## 2021-01-17 ENCOUNTER — HOSPITAL ENCOUNTER (EMERGENCY)
Age: 71
Discharge: HOME | End: 2021-01-17
Payer: MEDICARE

## 2021-01-17 ENCOUNTER — APPOINTMENT (OUTPATIENT)
Dept: GENERAL RADIOLOGY | Age: 71
End: 2021-01-17
Payer: MEDICARE

## 2021-01-17 VITALS
DIASTOLIC BLOOD PRESSURE: 81 MMHG | HEIGHT: 72 IN | TEMPERATURE: 97.6 F | RESPIRATION RATE: 16 BRPM | BODY MASS INDEX: 25.87 KG/M2 | WEIGHT: 191 LBS | OXYGEN SATURATION: 98 % | HEART RATE: 89 BPM | SYSTOLIC BLOOD PRESSURE: 159 MMHG

## 2021-01-17 DIAGNOSIS — M25.561 ACUTE PAIN OF RIGHT KNEE: Primary | ICD-10-CM

## 2021-01-17 PROCEDURE — 99282 EMERGENCY DEPT VISIT SF MDM: CPT

## 2021-01-17 PROCEDURE — 73564 X-RAY EXAM KNEE 4 OR MORE: CPT

## 2021-01-17 RX ORDER — LIDOCAINE 40 MG/G
CREAM TOPICAL PRN
Status: DISCONTINUED | OUTPATIENT
Start: 2021-01-17 | End: 2021-01-17 | Stop reason: HOSPADM

## 2021-01-17 RX ORDER — LIDOCAINE 40 MG/G
CREAM TOPICAL
Qty: 1 TUBE | Refills: 0 | Status: SHIPPED | OUTPATIENT
Start: 2021-01-17 | End: 2021-02-08

## 2021-01-17 RX ORDER — HYDROCODONE BITARTRATE AND ACETAMINOPHEN 5; 325 MG/1; MG/1
1 TABLET ORAL EVERY 6 HOURS PRN
Qty: 10 TABLET | Refills: 0 | Status: SHIPPED | OUTPATIENT
Start: 2021-01-17 | End: 2021-01-20

## 2021-01-17 ASSESSMENT — ENCOUNTER SYMPTOMS
SHORTNESS OF BREATH: 0
BACK PAIN: 0
COLOR CHANGE: 0

## 2021-01-17 NOTE — ED PROVIDER NOTES
[facility]  eMERGENCY dEPARTMENT eNCOUnter          CHIEF COMPLAINT       Chief Complaint   Patient presents with    Knee Pain     rt       Nurses Notes reviewed and I agree except as noted in the HPI. HISTORY OF PRESENT ILLNESS    Sagar Baca is a 79 y.o. male with a history of right knee osteoarthritis who presents to the Emergency Department for the evaluation of right knee pain x 1 day. Patient was fixing the feeding drop [place] the farm yesterday when a pig ran and hit the front of his right knee. Patient said his leg went backwards. He did not sustain a fall. The pain came on right after and is described as a constant, sharp pain generalized over the entire knee. Rates it a 5/10 and denies radiation. The pain is different in character than his arthritis. Patient stated the swelling came on gradually yesterday and that he iced it for the first time today and that helped a little. Denies any popping or twisting at the time of injury. Denies buckling, locking, and giving out of the knee. The pain wakes the patient up in the night and he has tried tylenol and stated no relief. Patient stated pain is worse with weight bearing. He has been having trouble getting up and down steps and has been using a walker as a precautionary measure, but stated that he wouldn't need it once he has been walking for a little bit since his knee would be warmed up by then and less stiff. Denies any skin changes and new hip or ankle pain. Patient is a farmer and normally very active. The HPI was provided by the patient. REVIEW OF SYSTEMS     Review of Systems   Constitutional: Negative for chills and fever. Respiratory: Negative for shortness of breath. Cardiovascular: Negative for chest pain. Musculoskeletal: Positive for arthralgias (ongoing), gait problem and joint swelling. Negative for back pain and myalgias.         Positive for decrease ROM right knee   Skin: Negative for color change and rash.   Neurological: Negative for weakness and numbness. No paresthesia    Psychiatric/Behavioral: Positive for sleep disturbance. PAST MEDICAL HISTORY    has a past medical history of Arthritis, Colon abnormality, Hyperlipidemia, and Hypertension. SURGICAL HISTORY      has a past surgical history that includes Knee arthroscopy (Right, 2013); Colonoscopy (around 2008); hernia repair (Bilateral, [id number]); Colonoscopy (9/4/13); colectomy (9-16-13); and Septoplasty (Left, 4/1/2019). CURRENT MEDICATIONS       Discharge Medication List as of 1/17/2021  5:36 PM      CONTINUE these medications which have NOT CHANGED    Details   ibandronate (BONIVA) 150 MG tablet Take 150 mg by mouth every 30 daysHistorical Med      cyclobenzaprine (FLEXERIL) 10 MG tablet Take 10 mg by mouthHistorical Med      methotrexate (RHEUMATREX) 2.5 MG chemo tablet Take 17.5 mg by mouth once a week Historical Med      ezetimibe (ZETIA) 10 MG tablet Take 10 mg by mouth daily      lisinopril (PRINIVIL;ZESTRIL) 10 MG tablet Take 10 mg by mouth daily. Calcium Carbonate (CALCIUM 500 PO) Take  by mouth. hydroxychloroquine (PLAQUENIL) 200 MG tablet Take 200 mg by mouth 2 times daily 2 tabHistorical Med             ALLERGIES     is allergic to seasonal.    FAMILY HISTORY     He indicated that his mother is alive. He indicated that his father is alive. He indicated that his sister is alive. He indicated that his brother is alive. family history includes Arthritis in his mother; Cancer in his father; Diabetes in his brother; High Blood Pressure in his father and mother. SOCIAL HISTORY      reports that he has never smoked. He has never used smokeless tobacco. He reports that he does not drink alcohol or use drugs. PHYSICAL EXAM     INITIAL VITALS:  height is 6' (1.829 m) and weight is 191 lb (86.6 kg). His oral temperature is 97.6 °F (36.4 °C). His blood pressure is 159/81 (abnormal) and his pulse is 89.  His respiration is 16 and oxygen saturation is 98%. Physical Exam  Constitutional:       General: He is awake. Appearance: Normal appearance. Cardiovascular:      Rate and Rhythm: Normal rate and regular rhythm. Pulses:           Posterior tibial pulses are 2+ on the right side and 2+ on the left side. Heart sounds: No murmur. No friction rub. No gallop. Pulmonary:      Effort: Pulmonary effort is normal.      Breath sounds: Normal breath sounds. No wheezing, rhonchi or rales. Musculoskeletal:      Right hip: Normal. He exhibits normal range of motion, normal strength, no tenderness and no bony tenderness. Left hip: Normal. He exhibits normal range of motion, normal strength, no tenderness and no bony tenderness. Right knee: He exhibits no LCL laxity and no MCL laxity. Left knee: Normal. He exhibits normal range of motion, no swelling, no effusion and no ecchymosis. No tenderness found. Right ankle: Normal. He exhibits normal range of motion, no swelling and no ecchymosis. No tenderness. Left ankle: Normal. He exhibits normal range of motion, no swelling and no ecchymosis. No tenderness. Right lower leg: No edema. Left lower leg: No edema. Legs:       Comments: Effusion of the right knee and distal aspect of the quadricep muscles. No erythema, atrophy, ecchymosis. No warmth or nodules. Tenderness to palpation of the lateral and medial joint lines, quadricep muscles and slight tenderness to palpation of the popliteal fossa. Limited active ROM to extension and flexion of the knee due to pain. 5/5 strength. Neurological:      General: No focal deficit present. Mental Status: He is alert and oriented to person, place, and time. Sensory: Sensation is intact. Motor: Motor function is intact. Gait: Gait is intact. Deep Tendon Reflexes:      Reflex Scores:       Patellar reflexes are 2+ on the right side and 2+ on the left side.   Psychiatric: Behavior: Behavior is cooperative. DIFFERENTIAL DIAGNOSIS:   Differential diagnoses are discussed muscle strain, muscle contusion, ligament injury, fracture    DIAGNOSTIC RESULTS     EKG: All EKG's are interpreted by the Emergency Department Physician who either signs or Co-signsthis chart in the absence of a cardiologist.          RADIOLOGY: non-plain film images(s) such as CT, Ultrasound and MRI are read by the radiologist.    XR KNEE RIGHT (MIN 4 VIEWS)   Final Result   1. No acute fracture. 2. Tricompartmental degenerative changes of the right knee joint is demonstrated, most pronounced within the lateral compartment. 3. Small right knee joint effusion is demonstrated. **This report has been created using voice recognition software. It may contain minor errors which are inherent in voice recognition technology. **      Final report electronically signed by Dr. Tena Calhoun on 1/17/2021 4:28 PM          LABS:    Labs Reviewed - No data to display    EMERGENCY DEPARTMENT COURSE:   Vitals:    Vitals:    01/17/21 1536   BP: (!) 159/81   Pulse: 89   Resp: 16   Temp: 97.6 °F (36.4 °C)   TempSrc: Oral   SpO2: 98%   Weight: 191 lb (86.6 kg)   Height: 6' (1.829 m)      5:19 PM EST: The patient was seen and evaluated. Patient presents for complaints of right knee injury that occurred yesterday. Reports continued pain, though somewhat improved after ice. He is NV intact on exam. No deformity or laxity noted. X-ray shows significant OA without acute fracture or dislocation. Results discussed with the patient and family at bedside. He was treated in the ED with lidocaine cream and knee immobilizer. Discussed importance of outpatient follow-up should symptoms persist. He will continue to ambulate using assistive device for safety. NADINE advised. He was agreeable with the above plan.  Prescriptions provided for lidocaine cream, voltaren gel and norco. Risks of Norco discussed including potential for drowsiness/falls or addiction. Patient denied further needs upon discharge. CRITICAL CARE:   None     CONSULTS:  None    PROCEDURES:  None    FINAL IMPRESSION      1. Acute pain of right knee          DISPOSITION/PLAN   Discharge    PATIENT REFERRED TO:  King Cline MD  [facility] [address]  [phone]    In 3 days      [address] [facility] [address] [address] EMERGENCY DEPT  [address] [place] [address]  [phone]    If symptoms worsen      DISCHARGEMEDICATIONS:  Discharge Medication List as of 1/17/2021  5:36 PM      START taking these medications    Details   lidocaine (LMX) 4 % cream Apply to right knee TID PRN pain, Disp-1 Tube, R-0, Print      diclofenac sodium (VOLTAREN) 1 % GEL Apply 2 g topically 2 times daily, Topical, 2 TIMES DAILY Starting Sun 1/17/2021, Disp-50 g, R-0, Print      HYDROcodone-acetaminophen (NORCO) 5-325 MG per tablet Take 1 tablet by mouth every 6 hours as needed for Pain for up to 3 days. WARNING: This medication may make you drowsy.  Do not operate heavy machinery or motor vehicles during use., Disp-10 tablet, R-0Print             (Please note that portions of this note were completedwith a voice recognition program.  Efforts were made to edit the dictations but occasionally words are mis-transcribed.)       Jo Silver PA-C  01/20/21 9678

## 2021-01-17 NOTE — ED NOTES
Pt to er. Pt c/o rt knee pain. States has hx of arthritis but yesterday he thinks he hyperextended it and states now it is hard to get walking. Pt using walker at this time. Pt states took motrin at 1300 today.       Hazel Hughes RN  01/17/21 9471

## 2021-01-18 ENCOUNTER — CARE COORDINATION (OUTPATIENT)
Dept: CARE COORDINATION | Age: 71
End: 2021-01-18

## 2021-02-03 ENCOUNTER — HOSPITAL ENCOUNTER (OUTPATIENT)
Age: 71
Discharge: HOME | End: 2021-02-03
Payer: MEDICARE

## 2021-02-03 LAB
ANION GAP SERPL CALCULATED.3IONS-SCNC: 9 MEQ/L (ref 8–16)
APTT: 28.8 SECONDS (ref 22–38)
BUN BLDV-MCNC: 20 MG/DL (ref 7–22)
CALCIUM SERPL-MCNC: 9.6 MG/DL (ref 8.5–10.5)
CHLORIDE BLD-SCNC: 102 MEQ/L (ref 98–111)
CO2: 28 MEQ/L (ref 23–33)
CREAT SERPL-MCNC: 0.9 MG/DL (ref 0.4–1.2)
EKG ATRIAL RATE: 64 BPM
EKG P AXIS: 63 DEGREES
EKG P-R INTERVAL: 132 MS
EKG Q-T INTERVAL: 428 MS
EKG QRS DURATION: 116 MS
EKG QTC CALCULATION (BAZETT): 441 MS
EKG R AXIS: 86 DEGREES
EKG T AXIS: 44 DEGREES
EKG VENTRICULAR RATE: 64 BPM
ERYTHROCYTE [DISTWIDTH] IN BLOOD BY AUTOMATED COUNT: 13.2 % (ref 11.5–14.5)
ERYTHROCYTE [DISTWIDTH] IN BLOOD BY AUTOMATED COUNT: 47.2 FL (ref 35–45)
GFR SERPL CREATININE-BSD FRML MDRD: 83 ML/MIN/1.73M2
GLUCOSE BLD-MCNC: 87 MG/DL (ref 70–108)
HCT VFR BLD CALC: 46.9 % (ref 42–52)
HEMOGLOBIN: 15.6 GM/DL (ref 14–18)
INR BLD: 1.11 (ref 0.85–1.13)
MCH RBC QN AUTO: 32.3 PG (ref 26–33)
MCHC RBC AUTO-ENTMCNC: 33.3 GM/DL (ref 32.2–35.5)
MCV RBC AUTO: 97.1 FL (ref 80–94)
MRSA NASAL SCREEN RT-PCR: NEGATIVE
PLATELET # BLD: 272 THOU/MM3 (ref 130–400)
PMV BLD AUTO: 9.6 FL (ref 9.4–12.4)
POTASSIUM SERPL-SCNC: 4.8 MEQ/L (ref 3.5–5.2)
RBC # BLD: 4.83 MILL/MM3 (ref 4.7–6.1)
SODIUM BLD-SCNC: 139 MEQ/L (ref 135–145)
STAPH AUREUS SCREEN RT-PCR: NEGATIVE
WBC # BLD: 5.4 THOU/MM3 (ref 4.8–10.8)

## 2021-02-03 PROCEDURE — 87641 MR-STAPH DNA AMP PROBE: CPT

## 2021-02-03 PROCEDURE — 87640 STAPH A DNA AMP PROBE: CPT

## 2021-02-03 PROCEDURE — 87081 CULTURE SCREEN ONLY: CPT

## 2021-02-03 PROCEDURE — 36415 COLL VENOUS BLD VENIPUNCTURE: CPT

## 2021-02-03 PROCEDURE — 93005 ELECTROCARDIOGRAM TRACING: CPT | Performed by: ORTHOPAEDIC SURGERY

## 2021-02-03 PROCEDURE — 85730 THROMBOPLASTIN TIME PARTIAL: CPT

## 2021-02-03 PROCEDURE — 85610 PROTHROMBIN TIME: CPT

## 2021-02-03 PROCEDURE — 80048 BASIC METABOLIC PNL TOTAL CA: CPT

## 2021-02-03 PROCEDURE — 85027 COMPLETE CBC AUTOMATED: CPT

## 2021-02-03 PROCEDURE — 93010 ELECTROCARDIOGRAM REPORT: CPT | Performed by: NUCLEAR MEDICINE

## 2021-02-05 LAB — MRSA SCREEN: NORMAL

## 2021-02-08 RX ORDER — LANOLIN ALCOHOL/MO/W.PET/CERES
800 CREAM (GRAM) TOPICAL 2 TIMES DAILY
COMMUNITY

## 2021-02-08 NOTE — PROGRESS NOTES

## 2021-02-11 ENCOUNTER — ANESTHESIA EVENT (OUTPATIENT)
Dept: OPERATING ROOM | Age: 71
DRG: 460 | End: 2021-02-11
Payer: MEDICARE

## 2021-02-11 NOTE — PROGRESS NOTES
Patient contacted regarding COVID-19 screen. Patient tested positive about a month ago but never showed symptoms, states he was retested and was negative, asked if Dr. Faisal Fermin had him retested for surgery, he states no. In the last month, have you been in contact with someone who was confirmed or suspected to have Coronavirus/COVID-19:  Patient stated NO      Pt was informed can be a visitor allowed. Please bring masks. Do you or family members have any of the following symptoms:  Cough-no   Muscle pain-no   Shortness of breath-no   Fever-no   Weakness-no  Severe headache-no   Sore throat-no   Respiratory symptoms-no    Have you traveled internationally in the last month?  No     Have you been to the emergency room recently- 3 weeks ago, hog hit knee, hyperextended

## 2021-02-11 NOTE — H&P
History and Physical    Debbi Montesinos (1950)  2/11/2021      CHIEF COMPLAINT:  Lumbar pain    HISTORY OF PRESENT ILLNESS:    The patient is a 79-year-old male who was having lumbar pain that radiates numbness into the left anterior thigh. Patient also reports the left leg gives out on him at times. Patient reports he is been having symptoms for over 6 months. Currently his VAS score of 3 out of 10. Activities aggravate the pain include standing, walking, lying down, rising from sitting, and driving. Activities help relieve the pain include changing positions. Modifying factors include anti-inflammatories, muscle relaxers, physical therapy, back exercises, traction, and epidural steroid injections. Patient reports these are provide him with really no lasting relief. No previous spinal surgeries. Non-smoker. PCP: Carlito Guajardo MD    Past Medical History:        Diagnosis Date    Arthritis     osteo    Colon abnormality Aug 2013    \"perforated bowel\" scheduled for colonoscopy    Hyperlipidemia     Hypertension      Past Surgical History:        Procedure Laterality Date    COLECTOMY  9-16-13    robotic LAR with firefly and rigid sigmoidoscopy-Dr. Noemi Holloway COLONOSCOPY  around 2008    COLONOSCOPY  9/4/13    Dr. Bender [place] Bilateral [phone]    inguinal    KNEE ARTHROSCOPY Right 2013    SEPTOPLASTY Left 4/1/2019    SEPTOPLASTY, SUBMUCOUS RESECT TURBINATESD (SMR), PATRIAL, LEFT INFERIOR, POSSIBLE RIGHT performed by Lilliam Cui MD at [facility]     Current Medications:   Prior to Admission medications    Medication Sig Start Date End Date Taking?  Authorizing Provider   folic acid (FOLVITE) 355 MCG tablet Take 800 mcg by mouth 2 times daily   Yes Historical Provider, MD   ibandronate (BONIVA) 150 MG tablet Take 150 mg by mouth every 30 days   Yes Historical Provider, MD   methotrexate (RHEUMATREX) 2.5 MG chemo tablet Take 17.5 mg by mouth once a week On Mondays   Yes Historical Provider, MD ezetimibe (ZETIA) 10 MG tablet Take 10 mg by mouth daily   Yes Historical Provider, MD   lisinopril (PRINIVIL;ZESTRIL) 10 MG tablet Take 10 mg by mouth daily. Yes Historical Provider, MD   Calcium Carbonate (CALCIUM 500 PO) Take by mouth 2 times daily 2 tabs   Yes Historical Provider, MD   hydroxychloroquine (PLAQUENIL) 200 MG tablet Take 200 mg by mouth 2 times daily 2 tab   Yes Historical Provider, MD    D@  Allergies:  Seasonal    Social History:   TOBACCO:   reports that he has never smoked. He has never used smokeless tobacco.  ETOH:   reports no history of alcohol use. DRUGS:   reports no history of drug use. Family History:       Problem Relation Age of Onset    Arthritis Mother     High Blood Pressure Mother     Cancer Father         prostate    High Blood Pressure Father     Diabetes Brother         borderline       REVIEW OF SYSTEMS:    CONSTITUTIONAL:  negative for fevers, chills  RESPIRATORY:  negative for cough and shortness of breath  CARDIOVASCULAR:  negative for chest pain, palpitations  GASTROINTESTINAL:  negative for nausea, vomiting, incontinence  GENITOURINARY:  negative for frequency and urinary incontinence  MUSCULOSKELETAL:  (+) for back pain, leg pain  NEUROLOGICAL:  negative for numbness/tingling, headaches  BEHAVIOR/PSYCH:  negative for depressed mood, increased anxiety    PHYSICAL EXAM:    VITAL SIGNS: Ht 6 ft, Wt 190 lbs, BMI 25.77  CONSTITUTIONAL:  Awake, alert, cooperative, no apparent distress, and appears stated age. Antalgic gait  HEAD: Atraumatic, normocephalic  LUNGS:  No respiratory distress. CTA bilaterally. No wheezes, rales, rhonchi  CARDIOVASCULAR:  Regular rate and rhythm, no murmur  ABDOMEN:  Normal bowel sounds, soft, non-distended, non-tender  SPINE: Limited lumbar ROM. Inspection of the spine shows no skin lesions or open wounds. TTP lumbar spine. MUSCULOSKELETAL:  There is no redness, warmth, or swelling of the joints. Full range of motion noted.   Motor strength is 5 out of 5 except 4 out of 5 left iliopsoas and quad. NEUROLOGIC:  Awake, alert, oriented to name, place and time. Sensory is intact bilateral LE. DATA:    CBC:   Lab Results   Component Value Date    WBC 5.4 02/03/2021    RBC 4.83 02/03/2021    HGB 15.6 02/03/2021    HCT 46.9 02/03/2021    MCV 97.1 02/03/2021    MCH 32.3 02/03/2021    MCHC 33.3 02/03/2021    RDW 12.5 08/25/2015     02/03/2021    MPV 9.6 02/03/2021     WBC:    Lab Results   Component Value Date    WBC 5.4 02/03/2021     Hemoglobin/Hematocrit:    Lab Results   Component Value Date    HGB 15.6 02/03/2021    HCT 46.9 02/03/2021     CMP:    Lab Results   Component Value Date     02/03/2021    K 4.8 02/03/2021     02/03/2021    CO2 28 02/03/2021    BUN 20 02/03/2021    CREATININE 0.9 02/03/2021    LABGLOM 83 02/03/2021    GLUCOSE 87 02/03/2021    PROT 7.8 07/21/2013    LABALBU 4.5 07/21/2013    CALCIUM 9.6 02/03/2021    BILITOT 0.7 07/21/2013    ALKPHOS 48 07/21/2013    AST 24 07/21/2013    ALT 21 07/21/2013       Radiology:  MRI Lumbar Spine   Impression    Worsening degenerative changes of the lumbar spine most pronounced at L3-4 where there is a prominent left paracentral/subarticular extrusion which has appeared in the interval since prior exam and extends inferiorly to the inferior aspect of the L4    vertebral body and markedly narrows the left lateral recess and contributes to moderate spinal canal stenosis and appears to compress the traversing L4 nerve root on the left. There is also moderate neural frontal stenosis at this level.           IMPRESSION/RECOMMENDATIONS:    Assessment:   1. L2-S1 degenerative disc disease with stenosis and neurogenic claudication  2. L5-S1 spondylolisthesis, grade 1. Plan:  1.  L2-S1 decompression and fusion    [employer]

## 2021-02-12 ENCOUNTER — ANESTHESIA (OUTPATIENT)
Dept: OPERATING ROOM | Age: 71
DRG: 460 | End: 2021-02-12
Payer: MEDICARE

## 2021-02-12 ENCOUNTER — APPOINTMENT (OUTPATIENT)
Dept: GENERAL RADIOLOGY | Age: 71
DRG: 460 | End: 2021-02-12
Attending: ORTHOPAEDIC SURGERY
Payer: MEDICARE

## 2021-02-12 ENCOUNTER — HOSPITAL ENCOUNTER (INPATIENT)
Age: 71
LOS: 2 days | Discharge: HOME | DRG: 460 | End: 2021-02-14
Attending: ORTHOPAEDIC SURGERY | Admitting: ORTHOPAEDIC SURGERY
Payer: MEDICARE

## 2021-02-12 VITALS — OXYGEN SATURATION: 99 % | DIASTOLIC BLOOD PRESSURE: 81 MMHG | TEMPERATURE: 97 F | SYSTOLIC BLOOD PRESSURE: 124 MMHG

## 2021-02-12 DIAGNOSIS — M48.07 LUMBOSACRAL SPINAL STENOSIS: Primary | ICD-10-CM

## 2021-02-12 LAB
ABO: NORMAL
ANTIBODY SCREEN: NORMAL
RH FACTOR: NORMAL

## 2021-02-12 PROCEDURE — 86850 RBC ANTIBODY SCREEN: CPT

## 2021-02-12 PROCEDURE — 2500000003 HC RX 250 WO HCPCS: Performed by: NURSE ANESTHETIST, CERTIFIED REGISTERED

## 2021-02-12 PROCEDURE — 6370000000 HC RX 637 (ALT 250 FOR IP): Performed by: PHYSICIAN ASSISTANT

## 2021-02-12 PROCEDURE — 6360000002 HC RX W HCPCS: Performed by: PHYSICIAN ASSISTANT

## 2021-02-12 PROCEDURE — 0SG1071 FUSION OF 2 OR MORE LUMBAR VERTEBRAL JOINTS WITH AUTOLOGOUS TISSUE SUBSTITUTE, POSTERIOR APPROACH, POSTERIOR COLUMN, OPEN APPROACH: ICD-10-PCS | Performed by: ORTHOPAEDIC SURGERY

## 2021-02-12 PROCEDURE — 01NB0ZZ RELEASE LUMBAR NERVE, OPEN APPROACH: ICD-10-PCS | Performed by: ORTHOPAEDIC SURGERY

## 2021-02-12 PROCEDURE — 86901 BLOOD TYPING SEROLOGIC RH(D): CPT

## 2021-02-12 PROCEDURE — 7100000000 HC PACU RECOVERY - FIRST 15 MIN: Performed by: ORTHOPAEDIC SURGERY

## 2021-02-12 PROCEDURE — 0SG3071 FUSION OF LUMBOSACRAL JOINT WITH AUTOLOGOUS TISSUE SUBSTITUTE, POSTERIOR APPROACH, POSTERIOR COLUMN, OPEN APPROACH: ICD-10-PCS | Performed by: ORTHOPAEDIC SURGERY

## 2021-02-12 PROCEDURE — 6360000002 HC RX W HCPCS: Performed by: NURSE ANESTHETIST, CERTIFIED REGISTERED

## 2021-02-12 PROCEDURE — 2580000003 HC RX 258: Performed by: NURSE ANESTHETIST, CERTIFIED REGISTERED

## 2021-02-12 PROCEDURE — 72020 X-RAY EXAM OF SPINE 1 VIEW: CPT

## 2021-02-12 PROCEDURE — C1713 ANCHOR/SCREW BN/BN,TIS/BN: HCPCS | Performed by: ORTHOPAEDIC SURGERY

## 2021-02-12 PROCEDURE — 2720000010 HC SURG SUPPLY STERILE: Performed by: ORTHOPAEDIC SURGERY

## 2021-02-12 PROCEDURE — 2580000003 HC RX 258: Performed by: PHYSICIAN ASSISTANT

## 2021-02-12 PROCEDURE — 3700000001 HC ADD 15 MINUTES (ANESTHESIA): Performed by: ORTHOPAEDIC SURGERY

## 2021-02-12 PROCEDURE — 36415 COLL VENOUS BLD VENIPUNCTURE: CPT

## 2021-02-12 PROCEDURE — 3600000005 HC SURGERY LEVEL 5 BASE: Performed by: ORTHOPAEDIC SURGERY

## 2021-02-12 PROCEDURE — 7100000001 HC PACU RECOVERY - ADDTL 15 MIN: Performed by: ORTHOPAEDIC SURGERY

## 2021-02-12 PROCEDURE — 86900 BLOOD TYPING SEROLOGIC ABO: CPT

## 2021-02-12 PROCEDURE — 3600000015 HC SURGERY LEVEL 5 ADDTL 15MIN: Performed by: ORTHOPAEDIC SURGERY

## 2021-02-12 PROCEDURE — 3700000000 HC ANESTHESIA ATTENDED CARE: Performed by: ORTHOPAEDIC SURGERY

## 2021-02-12 PROCEDURE — 01NR0ZZ RELEASE SACRAL NERVE, OPEN APPROACH: ICD-10-PCS | Performed by: ORTHOPAEDIC SURGERY

## 2021-02-12 PROCEDURE — 1200000000 HC SEMI PRIVATE

## 2021-02-12 PROCEDURE — 2709999900 HC NON-CHARGEABLE SUPPLY: Performed by: ORTHOPAEDIC SURGERY

## 2021-02-12 PROCEDURE — 6360000002 HC RX W HCPCS: Performed by: ORTHOPAEDIC SURGERY

## 2021-02-12 PROCEDURE — 2780000010 HC IMPLANT OTHER: Performed by: ORTHOPAEDIC SURGERY

## 2021-02-12 DEVICE — ROD 1553201110 5.5 TI CP4 NS CURV 110MM
Type: IMPLANTABLE DEVICE | Site: SPINE LUMBAR | Status: FUNCTIONAL
Brand: CD HORIZON® SPINAL SYSTEM

## 2021-02-12 RX ORDER — BISACODYL 10 MG
10 SUPPOSITORY, RECTAL RECTAL DAILY PRN
Status: DISCONTINUED | OUTPATIENT
Start: 2021-02-12 | End: 2021-02-14 | Stop reason: HOSPADM

## 2021-02-12 RX ORDER — SODIUM CHLORIDE 9 MG/ML
INJECTION, SOLUTION INTRAVENOUS CONTINUOUS
Status: DISCONTINUED | OUTPATIENT
Start: 2021-02-12 | End: 2021-02-12 | Stop reason: ALTCHOICE

## 2021-02-12 RX ORDER — MORPHINE SULFATE 2 MG/ML
2 INJECTION, SOLUTION INTRAMUSCULAR; INTRAVENOUS
Status: DISCONTINUED | OUTPATIENT
Start: 2021-02-12 | End: 2021-02-14 | Stop reason: HOSPADM

## 2021-02-12 RX ORDER — ONDANSETRON 2 MG/ML
INJECTION INTRAMUSCULAR; INTRAVENOUS PRN
Status: DISCONTINUED | OUTPATIENT
Start: 2021-02-12 | End: 2021-02-12 | Stop reason: SDUPTHER

## 2021-02-12 RX ORDER — ONDANSETRON 2 MG/ML
4 INJECTION INTRAMUSCULAR; INTRAVENOUS EVERY 6 HOURS PRN
Status: DISCONTINUED | OUTPATIENT
Start: 2021-02-12 | End: 2021-02-14 | Stop reason: HOSPADM

## 2021-02-12 RX ORDER — FENTANYL CITRATE 50 UG/ML
25 INJECTION, SOLUTION INTRAMUSCULAR; INTRAVENOUS EVERY 5 MIN PRN
Status: DISCONTINUED | OUTPATIENT
Start: 2021-02-12 | End: 2021-02-12 | Stop reason: HOSPADM

## 2021-02-12 RX ORDER — LIDOCAINE HCL/PF 100 MG/5ML
SYRINGE (ML) INJECTION PRN
Status: DISCONTINUED | OUTPATIENT
Start: 2021-02-12 | End: 2021-02-12 | Stop reason: SDUPTHER

## 2021-02-12 RX ORDER — SODIUM CHLORIDE 0.9 % (FLUSH) 0.9 %
10 SYRINGE (ML) INJECTION EVERY 12 HOURS SCHEDULED
Status: DISCONTINUED | OUTPATIENT
Start: 2021-02-12 | End: 2021-02-12 | Stop reason: HOSPADM

## 2021-02-12 RX ORDER — PROMETHAZINE HYDROCHLORIDE 25 MG/1
12.5 TABLET ORAL EVERY 6 HOURS PRN
Status: DISCONTINUED | OUTPATIENT
Start: 2021-02-12 | End: 2021-02-14 | Stop reason: HOSPADM

## 2021-02-12 RX ORDER — SODIUM CHLORIDE 9 MG/ML
INJECTION, SOLUTION INTRAVENOUS CONTINUOUS
Status: DISCONTINUED | OUTPATIENT
Start: 2021-02-12 | End: 2021-02-12

## 2021-02-12 RX ORDER — SODIUM CHLORIDE 0.9 % (FLUSH) 0.9 %
10 SYRINGE (ML) INJECTION EVERY 12 HOURS SCHEDULED
Status: DISCONTINUED | OUTPATIENT
Start: 2021-02-12 | End: 2021-02-14 | Stop reason: HOSPADM

## 2021-02-12 RX ORDER — OXYCODONE HYDROCHLORIDE AND ACETAMINOPHEN 5; 325 MG/1; MG/1
1 TABLET ORAL EVERY 6 HOURS PRN
Status: DISCONTINUED | OUTPATIENT
Start: 2021-02-12 | End: 2021-02-14 | Stop reason: HOSPADM

## 2021-02-12 RX ORDER — MORPHINE SULFATE 4 MG/ML
4 INJECTION, SOLUTION INTRAMUSCULAR; INTRAVENOUS
Status: DISCONTINUED | OUTPATIENT
Start: 2021-02-12 | End: 2021-02-14 | Stop reason: HOSPADM

## 2021-02-12 RX ORDER — SENNA AND DOCUSATE SODIUM 50; 8.6 MG/1; MG/1
1 TABLET, FILM COATED ORAL 2 TIMES DAILY
Status: DISCONTINUED | OUTPATIENT
Start: 2021-02-12 | End: 2021-02-14 | Stop reason: HOSPADM

## 2021-02-12 RX ORDER — POLYETHYLENE GLYCOL 3350 17 G/17G
17 POWDER, FOR SOLUTION ORAL DAILY
Status: DISCONTINUED | OUTPATIENT
Start: 2021-02-13 | End: 2021-02-14 | Stop reason: HOSPADM

## 2021-02-12 RX ORDER — LABETALOL 20 MG/4 ML (5 MG/ML) INTRAVENOUS SYRINGE
5 EVERY 10 MIN PRN
Status: DISCONTINUED | OUTPATIENT
Start: 2021-02-12 | End: 2021-02-12 | Stop reason: HOSPADM

## 2021-02-12 RX ORDER — MIDAZOLAM HYDROCHLORIDE 1 MG/ML
INJECTION INTRAMUSCULAR; INTRAVENOUS PRN
Status: DISCONTINUED | OUTPATIENT
Start: 2021-02-12 | End: 2021-02-12 | Stop reason: SDUPTHER

## 2021-02-12 RX ORDER — PHENYLEPHRINE HYDROCHLORIDE 10 MG/ML
INJECTION INTRAVENOUS PRN
Status: DISCONTINUED | OUTPATIENT
Start: 2021-02-12 | End: 2021-02-12 | Stop reason: SDUPTHER

## 2021-02-12 RX ORDER — FENTANYL CITRATE 50 UG/ML
50 INJECTION, SOLUTION INTRAMUSCULAR; INTRAVENOUS EVERY 5 MIN PRN
Status: DISCONTINUED | OUTPATIENT
Start: 2021-02-12 | End: 2021-02-12 | Stop reason: HOSPADM

## 2021-02-12 RX ORDER — SODIUM CHLORIDE 0.9 % (FLUSH) 0.9 %
10 SYRINGE (ML) INJECTION PRN
Status: DISCONTINUED | OUTPATIENT
Start: 2021-02-12 | End: 2021-02-14 | Stop reason: HOSPADM

## 2021-02-12 RX ORDER — IBANDRONATE SODIUM 150 MG/1
150 TABLET, FILM COATED ORAL
Status: DISCONTINUED | OUTPATIENT
Start: 2021-02-12 | End: 2021-02-12 | Stop reason: RX

## 2021-02-12 RX ORDER — 0.9 % SODIUM CHLORIDE 0.9 %
250 INTRAVENOUS SOLUTION INTRAVENOUS ONCE
Status: DISCONTINUED | OUTPATIENT
Start: 2021-02-13 | End: 2021-02-13

## 2021-02-12 RX ORDER — LISINOPRIL 10 MG/1
10 TABLET ORAL DAILY
Status: DISCONTINUED | OUTPATIENT
Start: 2021-02-13 | End: 2021-02-14 | Stop reason: HOSPADM

## 2021-02-12 RX ORDER — PROMETHAZINE HYDROCHLORIDE 25 MG/ML
12.5 INJECTION, SOLUTION INTRAMUSCULAR; INTRAVENOUS
Status: DISCONTINUED | OUTPATIENT
Start: 2021-02-12 | End: 2021-02-12 | Stop reason: HOSPADM

## 2021-02-12 RX ORDER — FENTANYL CITRATE 50 UG/ML
INJECTION, SOLUTION INTRAMUSCULAR; INTRAVENOUS PRN
Status: DISCONTINUED | OUTPATIENT
Start: 2021-02-12 | End: 2021-02-12 | Stop reason: SDUPTHER

## 2021-02-12 RX ORDER — EZETIMIBE 10 MG/1
10 TABLET ORAL DAILY
Status: DISCONTINUED | OUTPATIENT
Start: 2021-02-12 | End: 2021-02-12 | Stop reason: RX

## 2021-02-12 RX ORDER — VANCOMYCIN HYDROCHLORIDE 1 G/20ML
INJECTION, POWDER, LYOPHILIZED, FOR SOLUTION INTRAVENOUS PRN
Status: DISCONTINUED | OUTPATIENT
Start: 2021-02-12 | End: 2021-02-12 | Stop reason: ALTCHOICE

## 2021-02-12 RX ORDER — ROCURONIUM BROMIDE 10 MG/ML
INJECTION, SOLUTION INTRAVENOUS PRN
Status: DISCONTINUED | OUTPATIENT
Start: 2021-02-12 | End: 2021-02-12 | Stop reason: SDUPTHER

## 2021-02-12 RX ORDER — SODIUM CHLORIDE 0.9 % (FLUSH) 0.9 %
10 SYRINGE (ML) INJECTION PRN
Status: DISCONTINUED | OUTPATIENT
Start: 2021-02-12 | End: 2021-02-12 | Stop reason: HOSPADM

## 2021-02-12 RX ORDER — DEXAMETHASONE SODIUM PHOSPHATE 10 MG/ML
INJECTION, EMULSION INTRAMUSCULAR; INTRAVENOUS PRN
Status: DISCONTINUED | OUTPATIENT
Start: 2021-02-12 | End: 2021-02-12 | Stop reason: SDUPTHER

## 2021-02-12 RX ORDER — SODIUM CHLORIDE 9 MG/ML
INJECTION, SOLUTION INTRAVENOUS CONTINUOUS
Status: DISCONTINUED | OUTPATIENT
Start: 2021-02-12 | End: 2021-02-14 | Stop reason: HOSPADM

## 2021-02-12 RX ORDER — SODIUM PHOSPHATE, DIBASIC AND SODIUM PHOSPHATE, MONOBASIC 7; 19 G/133ML; G/133ML
1 ENEMA RECTAL DAILY PRN
Status: DISCONTINUED | OUTPATIENT
Start: 2021-02-12 | End: 2021-02-14 | Stop reason: HOSPADM

## 2021-02-12 RX ORDER — OXYCODONE HYDROCHLORIDE AND ACETAMINOPHEN 5; 325 MG/1; MG/1
2 TABLET ORAL EVERY 6 HOURS PRN
Status: DISCONTINUED | OUTPATIENT
Start: 2021-02-12 | End: 2021-02-14 | Stop reason: HOSPADM

## 2021-02-12 RX ORDER — MEPERIDINE HYDROCHLORIDE 25 MG/ML
12.5 INJECTION INTRAMUSCULAR; INTRAVENOUS; SUBCUTANEOUS EVERY 5 MIN PRN
Status: DISCONTINUED | OUTPATIENT
Start: 2021-02-12 | End: 2021-02-12 | Stop reason: HOSPADM

## 2021-02-12 RX ORDER — PROPOFOL 10 MG/ML
INJECTION, EMULSION INTRAVENOUS PRN
Status: DISCONTINUED | OUTPATIENT
Start: 2021-02-12 | End: 2021-02-12 | Stop reason: SDUPTHER

## 2021-02-12 RX ORDER — SODIUM CHLORIDE 9 MG/ML
INJECTION, SOLUTION INTRAVENOUS CONTINUOUS PRN
Status: DISCONTINUED | OUTPATIENT
Start: 2021-02-12 | End: 2021-02-12 | Stop reason: SDUPTHER

## 2021-02-12 RX ORDER — CYCLOBENZAPRINE HCL 10 MG
10 TABLET ORAL 3 TIMES DAILY PRN
Status: DISCONTINUED | OUTPATIENT
Start: 2021-02-12 | End: 2021-02-14 | Stop reason: HOSPADM

## 2021-02-12 RX ORDER — EPHEDRINE SULFATE/0.9% NACL/PF 50 MG/5 ML
SYRINGE (ML) INTRAVENOUS PRN
Status: DISCONTINUED | OUTPATIENT
Start: 2021-02-12 | End: 2021-02-12 | Stop reason: SDUPTHER

## 2021-02-12 RX ADMIN — PHENYLEPHRINE HYDROCHLORIDE 100 MCG: 10 INJECTION INTRAVENOUS at 10:24

## 2021-02-12 RX ADMIN — Medication 20 MG: at 07:30

## 2021-02-12 RX ADMIN — DOCUSATE SODIUM 50 MG AND SENNOSIDES 8.6 MG 1 TABLET: 8.6; 5 TABLET, FILM COATED ORAL at 20:30

## 2021-02-12 RX ADMIN — Medication 10 MG: at 10:30

## 2021-02-12 RX ADMIN — SODIUM CHLORIDE: 9 INJECTION, SOLUTION INTRAVENOUS at 17:24

## 2021-02-12 RX ADMIN — DEXAMETHASONE SODIUM PHOSPHATE 8 MG: 10 INJECTION, EMULSION INTRAMUSCULAR; INTRAVENOUS at 07:39

## 2021-02-12 RX ADMIN — MIDAZOLAM HYDROCHLORIDE 2 MG: 1 INJECTION, SOLUTION INTRAMUSCULAR; INTRAVENOUS at 07:22

## 2021-02-12 RX ADMIN — CYCLOBENZAPRINE 10 MG: 10 TABLET, FILM COATED ORAL at 20:30

## 2021-02-12 RX ADMIN — FENTANYL CITRATE 100 MCG: 50 INJECTION, SOLUTION INTRAMUSCULAR; INTRAVENOUS at 08:38

## 2021-02-12 RX ADMIN — ROCURONIUM BROMIDE 10 MG: 10 INJECTION INTRAVENOUS at 07:58

## 2021-02-12 RX ADMIN — FENTANYL CITRATE 100 MCG: 50 INJECTION, SOLUTION INTRAMUSCULAR; INTRAVENOUS at 07:46

## 2021-02-12 RX ADMIN — SODIUM CHLORIDE: 9 INJECTION, SOLUTION INTRAVENOUS at 07:45

## 2021-02-12 RX ADMIN — CEFAZOLIN 2000 MG: 10 INJECTION, POWDER, FOR SOLUTION INTRAVENOUS at 23:23

## 2021-02-12 RX ADMIN — CEFAZOLIN 2000 MG: 10 INJECTION, POWDER, FOR SOLUTION INTRAVENOUS at 15:44

## 2021-02-12 RX ADMIN — FENTANYL CITRATE 100 MCG: 50 INJECTION, SOLUTION INTRAMUSCULAR; INTRAVENOUS at 07:26

## 2021-02-12 RX ADMIN — Medication 50 MG: at 07:26

## 2021-02-12 RX ADMIN — OXYCODONE HYDROCHLORIDE AND ACETAMINOPHEN 2 TABLET: 5; 325 TABLET ORAL at 20:31

## 2021-02-12 RX ADMIN — ROCURONIUM BROMIDE 50 MG: 10 INJECTION INTRAVENOUS at 07:26

## 2021-02-12 RX ADMIN — SODIUM CHLORIDE: 9 INJECTION, SOLUTION INTRAVENOUS at 06:33

## 2021-02-12 RX ADMIN — PROPOFOL 150 MG: 10 INJECTION, EMULSION INTRAVENOUS at 07:26

## 2021-02-12 RX ADMIN — PHENYLEPHRINE HYDROCHLORIDE 100 MCG: 10 INJECTION INTRAVENOUS at 09:27

## 2021-02-12 RX ADMIN — Medication 10 MG: at 09:30

## 2021-02-12 RX ADMIN — ROCURONIUM BROMIDE 10 MG: 10 INJECTION INTRAVENOUS at 08:18

## 2021-02-12 RX ADMIN — ONDANSETRON HYDROCHLORIDE 4 MG: 4 INJECTION, SOLUTION INTRAMUSCULAR; INTRAVENOUS at 10:50

## 2021-02-12 RX ADMIN — MORPHINE SULFATE 2 MG: 2 INJECTION, SOLUTION INTRAMUSCULAR; INTRAVENOUS at 14:48

## 2021-02-12 RX ADMIN — PHENYLEPHRINE HYDROCHLORIDE 100 MCG: 10 INJECTION INTRAVENOUS at 08:23

## 2021-02-12 RX ADMIN — CEFAZOLIN 2 G: 10 INJECTION, POWDER, FOR SOLUTION INTRAVENOUS at 07:33

## 2021-02-12 RX ADMIN — Medication 10 MG: at 10:24

## 2021-02-12 RX ADMIN — MORPHINE SULFATE 4 MG: 4 INJECTION, SOLUTION INTRAMUSCULAR; INTRAVENOUS at 17:23

## 2021-02-12 ASSESSMENT — PULMONARY FUNCTION TESTS
PIF_VALUE: 28
PIF_VALUE: 13
PIF_VALUE: 0
PIF_VALUE: 15
PIF_VALUE: 11
PIF_VALUE: 26
PIF_VALUE: 1
PIF_VALUE: 26
PIF_VALUE: 25
PIF_VALUE: 26
PIF_VALUE: 15
PIF_VALUE: 17
PIF_VALUE: 8
PIF_VALUE: 15
PIF_VALUE: 24
PIF_VALUE: 26
PIF_VALUE: 1
PIF_VALUE: 26
PIF_VALUE: 24
PIF_VALUE: 15
PIF_VALUE: 26
PIF_VALUE: 15
PIF_VALUE: 25
PIF_VALUE: 26
PIF_VALUE: 1
PIF_VALUE: 28
PIF_VALUE: 26
PIF_VALUE: 25
PIF_VALUE: 11
PIF_VALUE: 26
PIF_VALUE: 28
PIF_VALUE: 12
PIF_VALUE: 14
PIF_VALUE: 23
PIF_VALUE: 17
PIF_VALUE: 25
PIF_VALUE: 25
PIF_VALUE: 9
PIF_VALUE: 24
PIF_VALUE: 25
PIF_VALUE: 14
PIF_VALUE: 25
PIF_VALUE: 15
PIF_VALUE: 15
PIF_VALUE: 17
PIF_VALUE: 27
PIF_VALUE: 28
PIF_VALUE: 26
PIF_VALUE: 15
PIF_VALUE: 25
PIF_VALUE: 1
PIF_VALUE: 25
PIF_VALUE: 26
PIF_VALUE: 14
PIF_VALUE: 25
PIF_VALUE: 25
PIF_VALUE: 24
PIF_VALUE: 15
PIF_VALUE: 25
PIF_VALUE: 24
PIF_VALUE: 26
PIF_VALUE: 15
PIF_VALUE: 27
PIF_VALUE: 13
PIF_VALUE: 27
PIF_VALUE: 12
PIF_VALUE: 10
PIF_VALUE: 23
PIF_VALUE: 29
PIF_VALUE: 23
PIF_VALUE: 24
PIF_VALUE: 14
PIF_VALUE: 26
PIF_VALUE: 24
PIF_VALUE: 14
PIF_VALUE: 18
PIF_VALUE: 25
PIF_VALUE: 26
PIF_VALUE: 27
PIF_VALUE: 19
PIF_VALUE: 26
PIF_VALUE: 25
PIF_VALUE: 25
PIF_VALUE: 12
PIF_VALUE: 24
PIF_VALUE: 12
PIF_VALUE: 27
PIF_VALUE: 9
PIF_VALUE: 23
PIF_VALUE: 1
PIF_VALUE: 8
PIF_VALUE: 26
PIF_VALUE: 15
PIF_VALUE: 27
PIF_VALUE: 24
PIF_VALUE: 15
PIF_VALUE: 26
PIF_VALUE: 10
PIF_VALUE: 17
PIF_VALUE: 26
PIF_VALUE: 30
PIF_VALUE: 28
PIF_VALUE: 25
PIF_VALUE: 13
PIF_VALUE: 29
PIF_VALUE: 24
PIF_VALUE: 25
PIF_VALUE: 23
PIF_VALUE: 14
PIF_VALUE: 24
PIF_VALUE: 25
PIF_VALUE: 26
PIF_VALUE: 25
PIF_VALUE: 25
PIF_VALUE: 24
PIF_VALUE: 26

## 2021-02-12 ASSESSMENT — PAIN SCALES - GENERAL
PAINLEVEL_OUTOF10: 8
PAINLEVEL_OUTOF10: 8
PAINLEVEL_OUTOF10: 0

## 2021-02-12 ASSESSMENT — PAIN - FUNCTIONAL ASSESSMENT: PAIN_FUNCTIONAL_ASSESSMENT: 0-10

## 2021-02-12 ASSESSMENT — PAIN DESCRIPTION - PAIN TYPE
TYPE: SURGICAL PAIN
TYPE: SURGICAL PAIN

## 2021-02-12 NOTE — PROGRESS NOTES
1100 pt arrived to PACU, awake and alert. Denies pain. Pedal push/pull strong bilaterally. VSS  1115 pt states dull ache 2/10 and tolerable. VSS, resp easy  1130 pt states pain 3/10 and tolerable, denies need for pain medication at this time.  Meets criteria for discharge from PACU, placed transport to [facility]  1145 pt states pain still 3/10, VSS  1205 transported to [facility]

## 2021-02-12 NOTE — OP NOTE
Operative Note      Patient: Chris Crane  YOB: 1950  MRN: [id number]                    ACCOUNT NO: [de-identified]                               ROOM: 012  ADMISSION DATE: 2/12/2021    PROVIDER:  Ida Blue M.D.     DATE OF PROCEDURE: 2/12/2021     PREOPERATIVE DIAGNOSES:  1. L2-S1 degenerative disc disease. 2.  L2-S1 lumbar stenosis with neurogenic claudication  3. L5-S1 spondylolisthesis, grade 1     POSTOPERATIVE DIAGNOSES:  1. L2-S1 degenerative disc disease. 2.  L2-S1 lumbar stenosis with neurogenic claudication  3. L5-S1 spondylolisthesis, grade 1     OPERATION PERFORMED:  1. L2-S1 bilateral laminectomy, partial medial facetectomies and foraminotomies of L2, L3, L4, L5, and S1 nerve roots along with total facetectomies at on the left L2-3, L3-4, L4-5, L5-S1 for complete decompression of the left L2, L3, L4, and L5 nerve roots  2. L2-S1 posterior spinal fusion. 3.  L2-S1 posterior spinal instrumentation, Solera 5.5, [employer]  4. Use of local autograft bone      SURGEON: Mitul Mary MD    ASSISTANT:  Senia Gonzalez PA-C and Dr. Edilberto Peterson  They assisted throughout the procedure with positioning, draping, retraction, wound closure, and dressing application. ANESTHESIA:  General.     INDICATIONS:  This is a 79 y.o. male with refractory back and left leg pain/thigh numbness from degenerative disc disease and multilevel lumbar stenosis from L2-S1. Patient has failed full conservative therapy including medication management, physical therapy, and epidural steroid injections. Due to the persistence of symptoms and reduction in the ADLs, patient elected surgical treatment. Patient, therefore, understood indications for the surgery as well as its risks, benefits, and alternatives. These risks include but are not limited to paralysis, infection, hematoma, dural tear, nerve root injury, nonunion, DVT/PE, stroke, MI, death etc.  All questions were answered.  Informed consent was obtained. OPERATIVE PROCEDURE:  The patient was taken to the operating room by the Anesthesiology Service and had satisfactory general anesthesia. A first-generation cephalosporin was given within 1 hour of surgical incision. 2 gm of cefazolin was given IV. Venous thromboembolic prophylaxis was performed with sequential devices. Esparza was placed using standard sterile technique. The patient was then positioned prone on a standard OSI frame with the abdomen hanging free and all bony prominences well padded. The low back was then prepped and draped in its entirety in the usual sterile fashion. Before incision, a formal timeout was taken per protocol. We next took a midline longitudinal approach and performed subperiosteal dissection out to the tips of the transverse processes of L2, L3, L4, L5, and S1. Intraoperative localization of level was confirmed using anatomic landmarks. We then began the laminectomy as well as decompression by removing the spinous process of L2, L3, L4 and L5. We entered the spinal canal, resecting the ligamentum flavum in its entirety over this region. Patient had significant stenosis in the lateral recess and neural foramina. Partial medial facetectomy was then performed with an osteotome to get lateral to the facet overhang, but just medial to the pedicles and nerve roots. Kerrison's were then used to perform foraminotomies of the L2, L3, L4, L5, and S1 nerve roots bilaterally. In order to get further lateral to the left L2, L3, L4, and L5 nerve roots, total facetectomy was performed at L2-3, L3-4, L4-5 and L5-S1 on the left. In this way, the left L2, L3, L4, and L5 nerve root was completely visualized and foraminized. In this way, we completed decompression at L2-3, L3-4, L4-5 and L5-S1 with foraminotomies of the L2, L3, L4, L5, and S1 nerve roots bilaterally.      Satisfied with this, we then turned our attention to perform spinal instrumentation and posterolateral fusion. Using anatomic landmarks and guided by direct visualization of the pedicles from within the canal, pedicle screws were placed bilaterally at L2, L3, L4 and S1 and on the left at L5. All the screws were completely interosseous as determined by bony palpation except for the tip of the S1 screw which remained bicortical by design. Before the screws were inserted, the transverse processes were decorticated with a high-speed bur at L2, L3, L4, L5 and S1. Local autograft bone was placed over the decorticated elements bilaterally from L2-S1. The screws were then inserted which were under tapped by 1 mm. Two rods were placed from L2-S1, set screws engaged and tightened down to their final torque. Everything was tightened down. A very rigid construct was achieved. Final x-ray was taken, demonstrated good position of the spine and all of the implants. Satisfied with this, we then achieved hemostasis. We then copiously irrigated the wound. We then inserted 2 Hemovac drains through a separate stab incision. The wound was then closed in layer with interrupted 1 and 2-0 Vicryl sutures and dusted with 2 g vancomycin powder. A 3-0 Monocryl was used for the skin. The skin edges were sealed with Dermabond. A dry sterile dressing was applied. The patient was then returned to the hospital bed, extubated, and taken to the recovery room in stable condition. Spinal cord monitoring remained stable throughout the operation. Specimens:   * No specimens in log *    Implants:  Implant Name Type Inv. Item Serial No.  Lot No. LRB No. Used Action   SCREW SPNL L45MM DIA7. 5MM POST THORACOLUMBOSACRAL CO CHROM  SCREW SPNL L45MM DIA7. 5MM POST THORACOLUMBOSACRAL CO CHROM  [employer] SOFAMOR DANEK-WD  N/A 1 Implanted   SCREW SPNL L50MM DIA7. 5MM POST THORACOLUMBOSACRAL CO CHROM  SCREW SPNL L50MM DIA7. 5MM POST THORACOLUMBOSACRAL CO CHROM  [employer] SOFAMOR DANEK-WD  N/A 8 Implanted   PRISCILA SPNL L110MM DIA5. 5MM TI ANT POST THORACOLUMBOSACRAL CRV  PRISCILA SPNL L110MM DIA5. 5MM TI ANT POST THORACOLUMBOSACRAL CRV  [employer] SOFAMOR DANEK-WD  N/A 2 Implanted   SET SCR SPNL L6MM DIA5. 5MM TI BRK OFF ALICIA W/ DETACH [address]  SET SCR SPNL L6MM DIA5. 5MM TI BRK OFF ALICIA W/ DETACH [address]  [employer] SOFAMOR DANEK-WD  N/A 1 Implanted       COMPLICATIONS:  None. SPECIMENS:  None. ESTIMATED BLOOD LOSS:  1,000 mL. (return 350 mL from Cell Saver)     POSTOPERATIVE CARE:  The patient will be recovered in PACU and then a regular nursing floor. Once the drainage is low and pain is under control, patient will be discharged home per clinical indication. Patient will follow up in the office in 6 weeks. At that time, AP and lateral x-rays of the lumbar spine will be obtained to assess instrumentation and fusion.       Electronically signed by Corky Millan MD on 2/12/2021 at 10:58 AM

## 2021-02-12 NOTE — ANESTHESIA PRE PROCEDURE
Department of Anesthesiology  Preprocedure Note       Name:  Rhona Newberry   Age:  79 y.o.  :  1950                                          MRN:  [id number]         Date:  2021      Surgeon: Deyanira Huizar):  Juanjo Montes MD    Procedure: Procedure(s):  L2-S1 DECOMPRESSION L2-S1 PSF    Medications prior to admission:   Prior to Admission medications    Medication Sig Start Date End Date Taking? Authorizing Provider   folic acid (FOLVITE) 676 MCG tablet Take 800 mcg by mouth 2 times daily   Yes Historical Provider, MD   ibandronate (BONIVA) 150 MG tablet Take 150 mg by mouth every 30 days   Yes Historical Provider, MD   methotrexate (RHEUMATREX) 2.5 MG chemo tablet Take 17.5 mg by mouth once a week On    Yes Historical Provider, MD   ezetimibe (ZETIA) 10 MG tablet Take 10 mg by mouth daily   Yes Historical Provider, MD   lisinopril (PRINIVIL;ZESTRIL) 10 MG tablet Take 10 mg by mouth daily. Yes Historical Provider, MD   Calcium Carbonate (CALCIUM 500 PO) Take by mouth 2 times daily 2 tabs   Yes Historical Provider, MD   hydroxychloroquine (PLAQUENIL) 200 MG tablet Take 200 mg by mouth 2 times daily 2 tab   Yes Historical Provider, MD       Current medications:    Current Facility-Administered Medications   Medication Dose Route Frequency Provider Last Rate Last Admin    0.9 % sodium chloride infusion   Intravenous Continuous KAN Dickson 125 mL/hr at 21 0633 New Bag at 21 4536    sodium chloride flush 0.9 % injection 10 mL  10 mL Intravenous 2 times per day KAN Dickson        sodium chloride flush 0.9 % injection 10 mL  10 mL Intravenous PRN KAN Dickson        ceFAZolin (ANCEF) 2000 mg in dextrose 5 % 50 mL IVPB  2,000 mg Intravenous On Call to [address]           Allergies:     Allergies   Allergen Reactions    Seasonal        Problem List:    Patient Active Problem List   Diagnosis Code    Diverticulitis of colon with perforation K57.20 HGB 15.6 02/03/2021    HCT 46.9 02/03/2021    MCV 97.1 02/03/2021    RDW 12.5 08/25/2015     02/03/2021       CMP:   Lab Results   Component Value Date     02/03/2021    K 4.8 02/03/2021     02/03/2021    CO2 28 02/03/2021    BUN 20 02/03/2021    CREATININE 0.9 02/03/2021    LABGLOM 83 02/03/2021    GLUCOSE 87 02/03/2021    PROT 7.8 07/21/2013    CALCIUM 9.6 02/03/2021    BILITOT 0.7 07/21/2013    ALKPHOS 48 07/21/2013    AST 24 07/21/2013    ALT 21 07/21/2013       POC Tests: No results for input(s): POCGLU, POCNA, POCK, POCCL, POCBUN, POCHEMO, POCHCT in the last 72 hours. Coags:   Lab Results   Component Value Date    INR 1.11 02/03/2021    APTT 28.8 02/03/2021       HCG (If Applicable): No results found for: PREGTESTUR, PREGSERUM, HCG, HCGQUANT     ABGs: No results found for: PHART, PO2ART, OUM1JMZ, SMU0LXF, BEART, G0SUGFHE     Type & Screen (If Applicable):  Lab Results   Component Value Date    LABRH POS 12/04/2020       Drug/Infectious Status (If Applicable):  No results found for: HIV, HEPCAB    COVID-19 Screening (If Applicable):   Lab Results   Component Value Date    COVID19 DETECTED 01/04/2021         Anesthesia Evaluation  Patient summary reviewed and Nursing notes reviewed no history of anesthetic complications:   Airway: Mallampati: II        Dental:          Pulmonary: breath sounds clear to auscultation                             Cardiovascular:  Exercise tolerance: good (>4 METS),   (+) hypertension:,       ECG reviewed  Rhythm: regular  Rate: normal                    Neuro/Psych:               GI/Hepatic/Renal:             Endo/Other:                     Abdominal:       Abdomen: soft. Vascular:                                        Anesthesia Plan      general     ASA 2       Induction: intravenous. MIPS: Postoperative opioids intended and Prophylactic antiemetics administered. Anesthetic plan and risks discussed with patient and spouse. Plan discussed with CRNA.                   [facility], DO   2/12/2021

## 2021-02-12 NOTE — ANESTHESIA POSTPROCEDURE EVALUATION
Department of Anesthesiology  Postprocedure Note    Patient: Franceen Rubinstein  MRN: [id number]  YOB: 1950  Date of evaluation: 2/12/2021  Time:  11:33 AM     Procedure Summary     Date: 02/12/21 Room / Location: [facility]  / [facility]    Anesthesia Start: 2267 Anesthesia Stop: 1103    Procedure: L2-S1 DECOMPRESSION L2-S1 PSF (N/A Spine Lumbar) Diagnosis: (LUMBOSACRAL SPINAL STENOSIS)    Surgeons: Gertrudis Jang MD Responsible Provider: Garcia Gregory DO    Anesthesia Type: general ASA Status: 2          Anesthesia Type: general    Karin Phase I: Karin Score: 9    Karin Phase II:      Last vitals: Reviewed and per EMR flowsheets.        Anesthesia Post Evaluation    Patient location during evaluation: PACU  Patient participation: complete - patient participated  Level of consciousness: awake  Airway patency: patent  Nausea & Vomiting: no nausea and no vomiting  Complications: no  Cardiovascular status: hemodynamically stable  Respiratory status: acceptable  Hydration status: stable

## 2021-02-13 LAB
ANION GAP SERPL CALCULATED.3IONS-SCNC: 9 MEQ/L (ref 8–16)
BUN BLDV-MCNC: 19 MG/DL (ref 7–22)
CALCIUM SERPL-MCNC: 7.7 MG/DL (ref 8.5–10.5)
CHLORIDE BLD-SCNC: 107 MEQ/L (ref 98–111)
CO2: 24 MEQ/L (ref 23–33)
CREAT SERPL-MCNC: 1 MG/DL (ref 0.4–1.2)
GFR SERPL CREATININE-BSD FRML MDRD: 74 ML/MIN/1.73M2
GLUCOSE BLD-MCNC: 112 MG/DL (ref 70–108)
HCT VFR BLD CALC: 30.8 % (ref 42–52)
HCT VFR BLD CALC: 31.7 % (ref 42–52)
HEMOGLOBIN: 10.2 GM/DL (ref 14–18)
HEMOGLOBIN: 10.5 GM/DL (ref 14–18)
POTASSIUM SERPL-SCNC: 4.8 MEQ/L (ref 3.5–5.2)
SODIUM BLD-SCNC: 140 MEQ/L (ref 135–145)

## 2021-02-13 PROCEDURE — 2580000003 HC RX 258: Performed by: INTERNAL MEDICINE

## 2021-02-13 PROCEDURE — 2580000003 HC RX 258: Performed by: PHYSICIAN ASSISTANT

## 2021-02-13 PROCEDURE — 97165 OT EVAL LOW COMPLEX 30 MIN: CPT

## 2021-02-13 PROCEDURE — 94760 N-INVAS EAR/PLS OXIMETRY 1: CPT

## 2021-02-13 PROCEDURE — 1200000000 HC SEMI PRIVATE

## 2021-02-13 PROCEDURE — 6370000000 HC RX 637 (ALT 250 FOR IP): Performed by: INTERNAL MEDICINE

## 2021-02-13 PROCEDURE — 97530 THERAPEUTIC ACTIVITIES: CPT

## 2021-02-13 PROCEDURE — 6360000002 HC RX W HCPCS: Performed by: INTERNAL MEDICINE

## 2021-02-13 PROCEDURE — P9047 ALBUMIN (HUMAN), 25%, 50ML: HCPCS | Performed by: INTERNAL MEDICINE

## 2021-02-13 PROCEDURE — 80048 BASIC METABOLIC PNL TOTAL CA: CPT

## 2021-02-13 PROCEDURE — 36415 COLL VENOUS BLD VENIPUNCTURE: CPT

## 2021-02-13 PROCEDURE — 85014 HEMATOCRIT: CPT

## 2021-02-13 PROCEDURE — 6370000000 HC RX 637 (ALT 250 FOR IP): Performed by: PHYSICIAN ASSISTANT

## 2021-02-13 PROCEDURE — 85018 HEMOGLOBIN: CPT

## 2021-02-13 RX ORDER — LANOLIN ALCOHOL/MO/W.PET/CERES
3 CREAM (GRAM) TOPICAL NIGHTLY PRN
Status: DISCONTINUED | OUTPATIENT
Start: 2021-02-13 | End: 2021-02-14 | Stop reason: HOSPADM

## 2021-02-13 RX ORDER — ALBUMIN (HUMAN) 12.5 G/50ML
25 SOLUTION INTRAVENOUS ONCE
Status: COMPLETED | OUTPATIENT
Start: 2021-02-13 | End: 2021-02-13

## 2021-02-13 RX ORDER — 0.9 % SODIUM CHLORIDE 0.9 %
500 INTRAVENOUS SOLUTION INTRAVENOUS ONCE
Status: COMPLETED | OUTPATIENT
Start: 2021-02-13 | End: 2021-02-13

## 2021-02-13 RX ADMIN — SODIUM CHLORIDE: 9 INJECTION, SOLUTION INTRAVENOUS at 23:47

## 2021-02-13 RX ADMIN — BISACODYL 5 MG: 5 TABLET, COATED ORAL at 08:26

## 2021-02-13 RX ADMIN — OXYCODONE HYDROCHLORIDE AND ACETAMINOPHEN 1 TABLET: 5; 325 TABLET ORAL at 23:41

## 2021-02-13 RX ADMIN — POLYETHYLENE GLYCOL 3350 17 G: 17 POWDER, FOR SOLUTION ORAL at 08:26

## 2021-02-13 RX ADMIN — Medication 10 ML: at 20:31

## 2021-02-13 RX ADMIN — SODIUM CHLORIDE: 9 INJECTION, SOLUTION INTRAVENOUS at 16:30

## 2021-02-13 RX ADMIN — DOCUSATE SODIUM 50 MG AND SENNOSIDES 8.6 MG 1 TABLET: 8.6; 5 TABLET, FILM COATED ORAL at 08:26

## 2021-02-13 RX ADMIN — OXYCODONE HYDROCHLORIDE AND ACETAMINOPHEN 1 TABLET: 5; 325 TABLET ORAL at 17:39

## 2021-02-13 RX ADMIN — ALBUMIN (HUMAN) 25 G: 0.25 INJECTION, SOLUTION INTRAVENOUS at 11:18

## 2021-02-13 RX ADMIN — SODIUM CHLORIDE 500 ML: 9 INJECTION, SOLUTION INTRAVENOUS at 00:15

## 2021-02-13 RX ADMIN — SODIUM CHLORIDE: 9 INJECTION, SOLUTION INTRAVENOUS at 06:47

## 2021-02-13 RX ADMIN — OXYCODONE HYDROCHLORIDE AND ACETAMINOPHEN 2 TABLET: 5; 325 TABLET ORAL at 10:59

## 2021-02-13 RX ADMIN — Medication 3 MG: at 23:42

## 2021-02-13 RX ADMIN — DOCUSATE SODIUM 50 MG AND SENNOSIDES 8.6 MG 1 TABLET: 8.6; 5 TABLET, FILM COATED ORAL at 20:28

## 2021-02-13 RX ADMIN — OXYCODONE HYDROCHLORIDE AND ACETAMINOPHEN 2 TABLET: 5; 325 TABLET ORAL at 04:40

## 2021-02-13 ASSESSMENT — PAIN DESCRIPTION - PROGRESSION
CLINICAL_PROGRESSION: NOT CHANGED

## 2021-02-13 ASSESSMENT — PAIN - FUNCTIONAL ASSESSMENT: PAIN_FUNCTIONAL_ASSESSMENT: ACTIVITIES ARE NOT PREVENTED

## 2021-02-13 ASSESSMENT — PAIN DESCRIPTION - PAIN TYPE
TYPE: ACUTE PAIN;SURGICAL PAIN
TYPE: ACUTE PAIN;SURGICAL PAIN
TYPE: SURGICAL PAIN

## 2021-02-13 ASSESSMENT — PAIN SCALES - GENERAL
PAINLEVEL_OUTOF10: 4
PAINLEVEL_OUTOF10: 5
PAINLEVEL_OUTOF10: 1
PAINLEVEL_OUTOF10: 7
PAINLEVEL_OUTOF10: 2

## 2021-02-13 ASSESSMENT — PAIN DESCRIPTION - DESCRIPTORS
DESCRIPTORS: ACHING
DESCRIPTORS: SHARP;ACHING

## 2021-02-13 ASSESSMENT — PAIN DESCRIPTION - FREQUENCY: FREQUENCY: CONTINUOUS

## 2021-02-13 ASSESSMENT — PAIN DESCRIPTION - ORIENTATION: ORIENTATION: LOWER;MID

## 2021-02-13 ASSESSMENT — PAIN DESCRIPTION - LOCATION: LOCATION: BACK

## 2021-02-13 NOTE — CONSULTS
Hospital Medicine Consult    Patient:  Sheryle Dao  MRN: [id number]    Referring Physician: Dr Linda Li  Reason for Consult: post op medical care, hypotension  [facility] Physician: Richard Alvarado MD    HISTORY OF PRESENT ILLNESS:   The patient is a 79 y.o. male with radicular lower back pain from L2-S1 DDD and lumbar spinal stenosis, admitted for L2-S1 decompression and PSF. EBL 1000ml with 350 ml return from cell saver. Has post op hypotension, responds to IVF bolus. He denies dizziness, no SOB, no CP, no HA. Past Medical History:        Diagnosis Date    Arthritis     osteo    Colon abnormality Aug 2013    \"perforated bowel\" scheduled for colonoscopy    Hyperlipidemia     Hypertension        Past Surgical History:        Procedure Laterality Date    COLECTOMY  9-16-13    robotic LAR with firefly and rigid sigmoidoscopy-Dr. Shannon Batres     COLONOSCOPY  around 2008    COLONOSCOPY  9/4/13    Dr. Arash Davenport Bilateral [phone]    inguinal    KNEE ARTHROSCOPY Right 2013    SEPTOPLASTY Left 4/1/2019    SEPTOPLASTY, SUBMUCOUS RESECT TURBINATESD (SMR), PATRIAL, LEFT INFERIOR, POSSIBLE RIGHT performed by Jose Disla MD at [facility]       Medications: Scheduled Meds:   lisinopril  10 mg Oral Daily    sodium chloride flush  10 mL Intravenous 2 times per day    sennosides-docusate sodium  1 tablet Oral BID    polyethylene glycol  17 g Oral Daily    bisacodyl  5 mg Oral Daily     Continuous Infusions:   sodium chloride 125 mL/hr at 02/13/21 0647     PRN Meds:.sodium chloride flush, promethazine **OR** ondansetron, morphine **OR** morphine, cyclobenzaprine, bisacodyl, fleet, oxyCODONE-acetaminophen **OR** oxyCODONE-acetaminophen    Allergies:  Seasonal    Social History:   TOBACCO:   reports that he has never smoked. He has never used smokeless tobacco.  ETOH:   reports no history of alcohol use.     Family History:       Problem Relation Age of Onset    Arthritis Mother     High Blood

## 2021-02-13 NOTE — PROGRESS NOTES
[facility] 60  INPATIENT OCCUPATIONAL THERAPY  [facility] ORTHOPEDICS 7K  EVALUATION    Time:   Time In:   Time Out: 0848  Timed Code Treatment Minutes: 23 Minutes  Minutes: 38          Date: 2021  Patient Name: Lloyd Chaves,   Gender: male      MRN: [id number]  : 1950  (79 y.o.)  Referring Practitioner: KAN Lloyd  Diagnosis: Lumbosacral Spinal Stenosis  Additional Pertinent Hx: Pt with refractory back and left leg pain/thigh numbness from degenerative disc disease and multilevel lumbar stenosis from L2-S1. Patient has failed full conservative therapy including medication management, physical therapy, and epidural steroid injections. Pt is S/P L2-S1 Bilateral laminectomies, facetectomies, foraminectomies with decompression of nerve roots at L2-L5 on 21. Restrictions/Precautions:  Restrictions/Precautions: Surgical Protocols  Position Activity Restriction  Spinal Precautions: No Bending, No Lifting, No Twisting    Subjective  Chart Reviewed: Yes, Orders, History and Physical  Response to previous treatment: Patient with no complaints from previous session    Subjective: Pleasant and cooperative  Comments: RN approved session. Pt's blood pressure was low but he was able to be up out of bed. He had only C/O mild pain at rest and moderate when moving. He stated that transition movements make it worse for a brief time. Pain:  Pain Assessment  Patient Currently in Pain: Yes  Pain Assessment: 0-10  Pain Level: 3  Pain Type: Surgical pain  Pain Location: Back  Pain Orientation: Lower  Pain Descriptors: Radha Deed; Aching  Pain Frequency: Continuous  Clinical Progression: Not changed  Patient's Stated Pain Goal: No pain  Response to Pain Intervention: Patient Satisfied  Multiple Pain Sites: No    Social/Functional History:  Lives With: Spouse  Type of Home: House  Home Layout: Two level, Able to Live on Main level with bedroom/bathroom  Home Access: Stairs to enter without rails  Entrance Stairs - Number of Steps: 2 steps  Home Equipment: Rolling walker, Cane   Bathroom Shower/Tub: Tub/Shower unit  Bathroom Toilet: Standard  Bathroom Accessibility: Accessible    Receives Help From: Family  ADL Assistance: Independent  Homemaking Assistance: Needs assistance  Homemaking Responsibilities: Yes  Ambulation Assistance: Independent  Transfer Assistance: Independent    Active : Yes  Occupation: Full time employment  Type of occupation: [employer] of over [place]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 He recently got the rolling walker from his son who also had back surgery last year. 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 Cognition/Orientation:  Overall Orientation Status: Within Normal Limits  Overall Cognitive Status: WFL    ADL's:  Additional Comments: None demonstrated. Vision - Basic Assessment  Prior Vision: Wears glasses all the time    Functional Mobility:  Bed mobility  Rolling to Left: Stand by assistance(while bed was flattened with cues for technique)  Supine to Sit: Stand by assistance(sidelying to sit using the bedrail)    Functional Mobility  Functional - Mobility Device: Rolling Walker  Activity: Other(140 ft)  Assist Level: Stand by assistance  Functional Mobility Comments: Pt walked with a steady pace. He had no unsteadiness.      Balance:  Balance  Sitting Balance: Supervision  Standing Balance: Stand by assistance(preparing to walk in hallway)  Standing Balance  Time: 40 seconds  Activity: Preparing to walk    Transfers:  Sit to stand: Stand by assistance(from the edge of bed with cues to push off of the bed)  Stand to sit: Stand by assistance(to the recliner chair with cues to reach back for the armrests and keeping his head up)       Upper Extremity Assessment:Hand Dominance: Right  LUE AROM : WFL  Left Hand AROM: WFL  RUE AROM : WFL  Right Hand AROM: WFL    LUE Strength  L Hand General: 4/5  LUE Strength Comment: Shoulder NT secondary to back precautions; 4/5 biceps/triceps  RUE Strength  R Hand General: 4/5  RUE Strength Comment: Shoulder NT secondary to back precautions; 4/5 biceps/triceps     Sensation  Overall Sensation Status: Impaired(pt reports that his numbness in his LLE is almost entirely gone)  Fine Motor Skills  Fine Motor Comment: No difficulty noted with feeding himself or using his cell phone. Activity Tolerance: Patient limited by pain  Pt was reporting pain mostly with transitional movements. He remained in the recliner chair following session with his pain level reported to be tolerable. Assessment:  Assessment: Patient would benefit from continued skilled OT services to address above deficits. He presents with lumbosacral spinal stenosis. He underwent lumbar surgery this admission. 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 He was not ambulating with any AD and was doing his own self care prior to admission. Pt demonstrated functional mobility walking in the hallway with SBA while using a rolling walker. He demonstrated bed mobility and transfers with SBA. Performance deficits / Impairments: Decreased ADL status, Decreased functional mobility , Decreased high-level IADLs, Decreased safe awareness  Prognosis: Good  REQUIRES OT FOLLOW UP: Yes  Decision Making: Low Complexity    Treatment Initiated: Treatment and education initiated within context of evaluation. Evaluation time included review of current medical information, gathering information related to past medical, social and functional history, completion of standardized testing, formal and informal observation of tasks, assessment of data and development of plan of care and goals.   Treatment time included skilled education and facilitation of tasks to increase safety and independence with ADL's for improved functional independence and quality of life. Back protection techniques were reviewed and precautions with a handout provided. Demonstration for safe lifting was provided. Per pt, he has had difficulty with standing for long periods. Encouraged pt to check his posture and to try keeping his legs staggered with one placed farther forward than the other and most of his weight on his back leg. Pt verbalized understanding. Discharge Recommendations:  Continue to assess pending progress, Patient would benefit from continued therapy after discharge    Patient Education:  OT Education: OT Role, Plan of Care, Precautions  Patient Education: Moving slowly during all transition movements; log roll technique    Equipment Recommendations: Other: Will continue to monitor- may benefit from a reacher    Plan:  Times per week: 6x  Current Treatment Recommendations: Functional Mobility Training, Self-Care / ADL, Safety Education & Training  Plan Comment: Pt would be able to return home with family when medically stable and discharged from Acute. No further OT recommended after discharge. Specific instructions for Next Treatment: Functional mobility; ADLs and pain management; dynamic reaching and back protection    Goals:  Patient goals : \"I want to return to being able to do things at home without having pain. \" pt states. Short term goals  Time Frame for Short term goals: By discharge  Short term goal 1: Pt will demonstrate functional mobility walking on various surfaces or within the kitchen environment with a rolling walker with Supervision to prepare for doing ADLs or light homemaking. Short term goal 2: Pt will complete lower body dressing or bathing while crossing his legs if needed with SBA to increase his independence with self care.   Short term goal 3: Pt will complete dynamic reaching tasks while following back protection techniques and maintaining good posture with SBA and verbal cues if needed to

## 2021-02-13 NOTE — PROGRESS NOTES
Department of Orthopedic Surgery  Spine Service  Attending Progress Note        Subjective:  Doing well. Notes incisional pain is tolerable with PO medications. Reports near complete resolution of Left leg numbness. Denies any N/V/CP/SOB. +Flatus. No BM yet. Vitals  VITALS:  BP (!) 97/54   Pulse 100   Temp 97.3 °F (36.3 °C) (Oral)   Resp 16   Ht 6' (1.829 m)   Wt 192 lb 3.2 oz (87.2 kg)   SpO2 95%   BMI 26.07 kg/m²   24HR INTAKE/OUTPUT:      Intake/Output Summary (Last 24 hours) at 2/13/2021 0944  Last data filed at 2/13/2021 4619  Gross per 24 hour   Intake 3904 ml   Output 2510 ml   Net 1394 ml     URINARY CATHETER OUTPUT (Morocho):  Urethral Catheter-Output (mL): 300 mL  DRAIN/TUBE OUTPUT:  Closed/Suction Drain Inferior;Midline Back Accordion-Output (ml): 150 ml  Closed/Suction Drain-Output (ml): 150 ml    PHYSICAL EXAM:    Orientation:  alert and oriented to person, place and time    Incision:  dressing in place, clean, dry, intact    Upper Extremity Motor :   Deltoid:  5/5  Biceps:  5/5  Triceps:  5/5  Wrist Flexion:  5/5  Wrist Extension:  5/5  Finger Flexion:  5/5  Finger Extension:  5/5    Upper Motor Neuron Signs:  None  Upper Extremity Sensory:  Intact C3-T1 distribution    Lower Extremity Motor :  quadriceps, extensor hallucis longus, dorsiflexion, plantarflexion 5/5 bilaterally  Lower Extremity Sensory:  Intact L1-S1    Flatus:  positive    ABNORMAL EXAM FINDINGS:  none    LABS:    HgB:    Lab Results   Component Value Date    HGB 10.2 02/13/2021       ASSESSMENT AND PLAN:    Post operative day 1 status post L2-S1 decompression and fusion     1:  Monitor labs and drain output- 300cc last shift. D/c morocho catheter this AM  2:  Activity Level:  Weight bearing as tolerated  3:  Pain Control:  PRN PO narcotics   4:  Discharge Planning:  Pending PT eval and pain control.  Like d/c to home POD#2     Gurwinder yN MD

## 2021-02-14 VITALS
BODY MASS INDEX: 26.03 KG/M2 | HEART RATE: 93 BPM | HEIGHT: 72 IN | WEIGHT: 192.2 LBS | SYSTOLIC BLOOD PRESSURE: 128 MMHG | TEMPERATURE: 98.5 F | OXYGEN SATURATION: 96 % | RESPIRATION RATE: 16 BRPM | DIASTOLIC BLOOD PRESSURE: 61 MMHG

## 2021-02-14 PROBLEM — I10 ESSENTIAL HYPERTENSION: Status: ACTIVE | Noted: 2021-02-14

## 2021-02-14 PROBLEM — D62 ACUTE BLOOD LOSS ANEMIA: Status: ACTIVE | Noted: 2021-02-14

## 2021-02-14 PROBLEM — I95.81 POSTPROCEDURAL HYPOTENSION: Status: ACTIVE | Noted: 2021-02-14

## 2021-02-14 LAB
ANION GAP SERPL CALCULATED.3IONS-SCNC: 4 MEQ/L (ref 8–16)
BUN BLDV-MCNC: 12 MG/DL (ref 7–22)
CALCIUM SERPL-MCNC: 7.6 MG/DL (ref 8.5–10.5)
CHLORIDE BLD-SCNC: 110 MEQ/L (ref 98–111)
CO2: 24 MEQ/L (ref 23–33)
CREAT SERPL-MCNC: 0.7 MG/DL (ref 0.4–1.2)
GFR SERPL CREATININE-BSD FRML MDRD: > 90 ML/MIN/1.73M2
GLUCOSE BLD-MCNC: 104 MG/DL (ref 70–108)
HCT VFR BLD CALC: 28.8 % (ref 42–52)
HEMOGLOBIN: 9.2 GM/DL (ref 14–18)
POTASSIUM SERPL-SCNC: 4.4 MEQ/L (ref 3.5–5.2)
SODIUM BLD-SCNC: 138 MEQ/L (ref 135–145)

## 2021-02-14 PROCEDURE — 85018 HEMOGLOBIN: CPT

## 2021-02-14 PROCEDURE — 2580000003 HC RX 258: Performed by: PHYSICIAN ASSISTANT

## 2021-02-14 PROCEDURE — 97535 SELF CARE MNGMENT TRAINING: CPT

## 2021-02-14 PROCEDURE — 36415 COLL VENOUS BLD VENIPUNCTURE: CPT

## 2021-02-14 PROCEDURE — 97162 PT EVAL MOD COMPLEX 30 MIN: CPT

## 2021-02-14 PROCEDURE — 97530 THERAPEUTIC ACTIVITIES: CPT

## 2021-02-14 PROCEDURE — 85014 HEMATOCRIT: CPT

## 2021-02-14 PROCEDURE — 6370000000 HC RX 637 (ALT 250 FOR IP): Performed by: PHYSICIAN ASSISTANT

## 2021-02-14 PROCEDURE — 80048 BASIC METABOLIC PNL TOTAL CA: CPT

## 2021-02-14 RX ORDER — CYCLOBENZAPRINE HCL 10 MG
10 TABLET ORAL 3 TIMES DAILY PRN
Qty: 30 TABLET | Refills: 0 | Status: SHIPPED | OUTPATIENT
Start: 2021-02-14 | End: 2021-02-24

## 2021-02-14 RX ORDER — OXYCODONE HYDROCHLORIDE AND ACETAMINOPHEN 5; 325 MG/1; MG/1
1 TABLET ORAL EVERY 6 HOURS PRN
Qty: 28 TABLET | Refills: 0 | Status: SHIPPED | OUTPATIENT
Start: 2021-02-14 | End: 2021-02-21

## 2021-02-14 RX ADMIN — POLYETHYLENE GLYCOL 3350 17 G: 17 POWDER, FOR SOLUTION ORAL at 07:45

## 2021-02-14 RX ADMIN — OXYCODONE HYDROCHLORIDE AND ACETAMINOPHEN 1 TABLET: 5; 325 TABLET ORAL at 12:16

## 2021-02-14 RX ADMIN — OXYCODONE HYDROCHLORIDE AND ACETAMINOPHEN 2 TABLET: 5; 325 TABLET ORAL at 05:31

## 2021-02-14 RX ADMIN — Medication 10 ML: at 07:40

## 2021-02-14 RX ADMIN — BISACODYL 10 MG: 10 SUPPOSITORY RECTAL at 12:16

## 2021-02-14 RX ADMIN — BISACODYL 5 MG: 5 TABLET, COATED ORAL at 07:45

## 2021-02-14 RX ADMIN — DOCUSATE SODIUM 50 MG AND SENNOSIDES 8.6 MG 1 TABLET: 8.6; 5 TABLET, FILM COATED ORAL at 07:45

## 2021-02-14 ASSESSMENT — PAIN SCALES - GENERAL
PAINLEVEL_OUTOF10: 3
PAINLEVEL_OUTOF10: 4
PAINLEVEL_OUTOF10: 7
PAINLEVEL_OUTOF10: 5
PAINLEVEL_OUTOF10: 1

## 2021-02-14 ASSESSMENT — PAIN DESCRIPTION - PROGRESSION
CLINICAL_PROGRESSION: NOT CHANGED

## 2021-02-14 ASSESSMENT — PAIN DESCRIPTION - DESCRIPTORS: DESCRIPTORS: ACHING

## 2021-02-14 ASSESSMENT — PAIN DESCRIPTION - LOCATION
LOCATION: BACK
LOCATION: BACK

## 2021-02-14 ASSESSMENT — PAIN DESCRIPTION - ORIENTATION: ORIENTATION: LOWER;MID

## 2021-02-14 ASSESSMENT — PAIN - FUNCTIONAL ASSESSMENT
PAIN_FUNCTIONAL_ASSESSMENT: ACTIVITIES ARE NOT PREVENTED
PAIN_FUNCTIONAL_ASSESSMENT: ACTIVITIES ARE NOT PREVENTED

## 2021-02-14 ASSESSMENT — PAIN DESCRIPTION - FREQUENCY: FREQUENCY: CONTINUOUS

## 2021-02-14 ASSESSMENT — PAIN DESCRIPTION - PAIN TYPE: TYPE: ACUTE PAIN;SURGICAL PAIN

## 2021-02-14 NOTE — PROGRESS NOTES
Message left for call back from [facility] [address] [place] [address] for referral at this time.

## 2021-02-14 NOTE — PLAN OF CARE
Problem: Pain:  Goal: Pain level will decrease  Description: Pain level will decrease  Outcome: Ongoing  Note: Pt's back pain has been 0-4/10 on pain scale. Pt pain goal is 0/10. Pt tolerating PO pain meds  and getting periods of rest     Problem: Musculor/Skeletal Functional Status  Goal: Highest potential functional level  Outcome: Ongoing  Note: Pt up with assist and walker. Tolerates well. Problem: Falls - Risk of:  Goal: Will remain free from falls  Description: Will remain free from falls  Outcome: Ongoing  Note: No falls this shift. Pt using call light appropriately to call for assistance with ambulation to the bathroom and to chair. Pt is also compliant with use of non-skid slippers. Pt reports understanding of fall prevention when discussed. Goal: Absence of physical injury  Description: Absence of physical injury  Outcome: Ongoing  Note: Pt free from injury and using call light appropriately     Problem: Daily Care:  Goal: Daily care needs are met  Description: Daily care needs are met  Outcome: Ongoing  Note: Pt needs some assistance with ADLs     Problem: Discharge Planning:  Goal: Patients continuum of care needs are met  Description: Patients continuum of care needs are met  Outcome: Ongoing  Note: Pt planning home with home health at discharge     Problem: Neurological  Goal: Maximum potential motor/sensory/cognitive function  Outcome: Ongoing  Note: A & O X 4. Denies numbness and tingling. Hand grasp, pedal push and pull strong     Problem: Cardiovascular  Goal: No DVT, peripheral vascular complications  Outcome: Ongoing  Note: No s/s of DVT. Pt refuses SCDs     Problem: GI  Goal: No bowel complications  Outcome: Ongoing  Note: No BM this shift. Pt has active bowel sounds, passing gas.      Problem:   Goal: Adequate urinary output  Outcome: Ongoing  Note: Pt voiding adequate amounts of urine     Problem: Skin Integrity/Risk  Goal: Wound healing  Outcome: Ongoing  Note: Back dressing D & I. No skin breakdown noted    Care plan reviewed with patient. Patient verbalize understanding of the plan of care and contribute to goal setting.

## 2021-02-14 NOTE — PROGRESS NOTES
Discharge instructions given to patient and spouse at this time. All belongings packed and ready for transport. Transport called to assist to private car via wheelchair.

## 2021-02-14 NOTE — DISCHARGE INSTR - DIET

## 2021-02-14 NOTE — PROGRESS NOTES
[facility]  INPATIENT PHYSICAL THERAPY  EVALUATION  [facility] ORTHOPEDICS 7K - 7K-08/008-A    Time In: 2834  Time Out: 5996  Timed Code Treatment Minutes: 15 Minutes  Minutes: 24        Date: 2021  Patient Name: Sagar Baca,  Gender:  male        MRN: [id number]  : 1950  (79 y.o.)      Referring Practitioner: KAN Finch  Diagnosis: Lumbosacral spinal stenosis  Additional Pertinent Hx: Pt with refractory back and left leg pain/thigh numbness from degenerative disc disease and multilevel lumbar stenosis from L2-S1. Patient has failed full conservative therapy including medication management, physical therapy, and epidural steroid injections. Pt is S/P L2-S1 Bilateral laminectomies, facetectomies, foraminectomies with decompression of nerve roots at L2-L5 on 21. Restrictions/Precautions:  Restrictions/Precautions: Surgical Protocols, General Precautions, Fall Risk  Position Activity Restriction  Spinal Precautions: No Bending, No Lifting, No Twisting    Subjective:  Chart Reviewed: Yes  Patient assessed for rehabilitation services?: Yes  Subjective: RN approved eval. pt in restroom on arrival, agrees to therapy session.     General:       Vision: Impaired  Vision Exceptions: Wears glasses at all times    Hearing: Within functional limits         Pain: no c/o    Social/Functional History:    Lives With: Spouse  Type of Home: House  Home Layout: Two level, Able to Live on Main level with bedroom/bathroom  Home Access: Stairs to enter without rails  Entrance Stairs - Number of Steps: 1  Home Equipment: Rolling walker, Cane     Bathroom Shower/Tub: Tub/Shower unit  Bathroom Toilet: Standard  Bathroom Accessibility: Accessible    Receives Help From: Family  ADL Assistance: Independent  Homemaking Assistance: Needs assistance  Homemaking Responsibilities: Yes  Ambulation Assistance: Independent  Transfer Assistance: Independent    Active : Yes  Occupation: Full time gathering information related to past medical, social and functional history, completion of standardized testing, formal and informal observation of tasks, assessment of data and development of plan of care and goals. Treatment time included skilled education and facilitation of tasks to increase safety and independence with functional mobility for improved independence and quality of life. Assessment: Body structures, Functions, Activity limitations: Decreased functional mobility , Decreased ADL status, Decreased balance, Decreased strength  Assessment: Pt presents this date s/p lumbar surgery with decreased mobility, strength, balance, and tolerance to activity compared to baseline function. He will benefit from skilled PT services to improve thes factors to restore prior function and increase level of independence.   Prognosis: Excellent    REQUIRES PT FOLLOW UP: Yes    Discharge Recommendations:  Discharge Recommendations: Continue to assess pending progress    Patient Education:  PT Education: Goals, PT Role, Plan of Care, Precautions, Functional Mobility Training    Equipment Recommendations:  Equipment Needed: No    Plan:  Times per week: 6x O  Current Treatment Recommendations: Strengthening, Balance Training, Functional Mobility Training, Transfer Training, Gait Training, Stair training, ADL/Self-care Training, Endurance Training, Home Exercise Program, Safety Education & Training, Patient/Caregiver Education & Training, Equipment Evaluation, Education, & procurement    Goals:  Patient goals : go home  Short term goals  Time Frame for Short term goals: by d/c  Short term goal 1: pt to perform supine to/from sit at Mod I to get in and out of bed  Short term goal 2: pt to perform sit to/from stand at Mod I to rise from bed or chair  Short term goal 3: pt to ambulate >150ft with RW at [facility] for mobility in the home and community  Short term goal 4: pt to negotiate platform step with RW at [facility] for home entry  Long term goals  Time Frame for Long term goals : defer d/t short LOS    Following session, patient left in safe position with all fall risk precautions in place.

## 2021-02-14 NOTE — PROGRESS NOTES
Referral made to Storm Elaine at [address] [place] [address] at this time for drain management.

## 2021-02-14 NOTE — PROGRESS NOTES
INTERNAL MEDICINE Progress Note  2/14/2021 12:06 PM  Subjective:   Admit Date: 2/12/2021  PCP: Rita Valero MD  Interval History:  Doing better today  No dizziness, no CP, no SOB    Objective:   Vitals: /61   Pulse 93   Temp 98.5 °F (36.9 °C) (Oral)   Resp 16   Ht 6' (1.829 m)   Wt 192 lb 3.2 oz (87.2 kg)   SpO2 96%   BMI 26.07 kg/m²   General appearance: alert and cooperative with exam  HEENT: Head: atraumatic  Neck: no adenopathy, no carotid bruit and no JVD  Lungs: clear to auscultation bilaterally  Heart: S1, S2 normal  Abdomen: soft, non-tender; bowel sounds normal; no masses,  no organomegaly  Extremities: no edema, redness or tenderness in the calves or thighs  Neurologic: Mental status: Alert, oriented, thought content appropriate    Medications:   Scheduled Meds:   [Held by provider] lisinopril  10 mg Oral Daily    sodium chloride flush  10 mL Intravenous 2 times per day    sennosides-docusate sodium  1 tablet Oral BID    polyethylene glycol  17 g Oral Daily    bisacodyl  5 mg Oral Daily     Continuous Infusions:   sodium chloride 125 mL/hr at 02/13/21 2347       Lab Results:   CBC:   Recent Labs     02/13/21  0020 02/13/21  0555 02/14/21  0641   HGB 10.5* 10.2* 9.2*     BMP:    Recent Labs     02/13/21  0555 02/14/21  0641    138   K 4.8 4.4    110   CO2 24 24   BUN 19 12   CREATININE 1.0 0.7   GLUCOSE 112* 104         Assessment and Plan:   1. L2-S1 DDD / spinal stenosis with NC  2. S/p L2-S1 decompression and PSF  3. Post op hypotension, improved  4. Acute blood loss anemia post op     Stable bp  Resume meds. Cont SCD.       Beatriz Reeves MD

## 2021-02-14 NOTE — PROGRESS NOTES
[facility]  Occupational Therapy  Daily Note  Time In: 2837  Time Out: 9655  Timed Code Treatment Minutes: 25 Minutes  Minutes: 34   *Time adjusted d/t PA visit       Date: 2021  Patient Name: Pacheco Short,   Gender: male      Room: [facility]08/008-A  MRN: [id number]  : 1950  (79 y.o.)  Referring Practitioner: KAN Farrell  Diagnosis: Lumbosacral Spinal Stenosis  Additional Pertinent Hx: Pt with refractory back and left leg pain/thigh numbness from degenerative disc disease and multilevel lumbar stenosis from L2-S1. Patient has failed full conservative therapy including medication management, physical therapy, and epidural steroid injections. Pt is S/P L2-S1 Bilateral laminectomies, facetectomies, foraminectomies with decompression of nerve roots at L2-L5 on 21. Restrictions/Precautions:  Restrictions/Precautions: Surgical Protocols  Position Activity Restriction  Spinal Precautions: No Bending, No Lifting, No Twisting      SUBJECTIVE: RN okayed session. Pt was supine in bed upon arrival. Pt was very pleasant and agreeable to OT. PAIN: 3/10    COGNITION: WNL    ADL:   Toileting: Supervision. Pt educated on adaptive techniques to prevent twisting during toileting hygiene. Toilet Transfer: Stand By Assistance. Pt carefully and slowly completed d/t lower toilet height. BALANCE:  Sitting Balance:  Modified Independent. Pt sat on EOB x10 min during discussion with PA  Standing Balance: Stand By Assistance. with BUE support on RW    BED MOBILITY:  Rolling to Left: Stand By Assistance    Supine to Sit: Stand By Assistance    Scooting: Stand By Assistance    Pt educated on log roll technique, reinforced safety with back precautions and bed mobility. Pt demoed good safety and understanding. TRANSFERS:  Sit to Stand:  Stand By Assistance. from EOB, VC for safe hand placement and pushing off of bed to stand  Stand to Sit: Stand By Assistance. FUNCTIONAL MOBILITY:  Assistive Device: Rolling Walker  Assist Level:  Stand By Assistance. Distance: To and from bathroom and HH distances around unit  Pt demoed good balance and safety with mobility. Pt required min VC to maintain RW close to body as pt with tendencies to bend forward slightly. Pt able to self correct and maintain good posture with RW after cueing. ADDITIONAL ACTIVITIES:  Pt was educated on completion of ADLs and IADLs and maintaining back precautions. Pt educated on utilization of toileting aid to prevent twisting. Pt reported understanding throughout. Pt also educated on adaptive techniques for lower body dressing. Pt reported that his wife will be able to assist as needed for donning pants and socks, but able to complete while maintaining back precautions. Pt educated on body mechanics with bending at hips if needed. ASSESSMENT:     Activity Tolerance:  Patient tolerance of  treatment: good. Discharge Recommendations: Continue to assess pending progress, Patient would benefit from continued therapy after discharge    Equipment Recommendations: Other: Will continue to monitor- may benefit from a reacher  Plan: Times per week: 6x  Specific instructions for Next Treatment: Functional mobility; ADLs and pain management; dynamic reaching and back protection  Current Treatment Recommendations: Functional Mobility Training, Self-Care / ADL, Safety Education & Training  Plan Comment: Pt would be able to return home with family when medically stable and discharged from Acute. No further OT recommended after discharge.     Patient Education  Patient Education: Plan of Care, Energy Conservation, Home Exercise Program, Reviewed Prior Education and Importance of Increasing Activity    Goals  Short term goals  Time Frame for Short term goals: By discharge  Short term goal 1: Pt will demonstrate functional mobility walking on various surfaces or within the kitchen environment with a rolling walker with Supervision to prepare for doing ADLs or light homemaking. Short term goal 2: Pt will complete lower body dressing or bathing while crossing his legs if needed with SBA to increase his independence with self care. Short term goal 3: Pt will complete dynamic reaching tasks while following back protection techniques and maintaining good posture with SBA and verbal cues if needed to increase his safety for doing light chores or shopping. Following session, patient left in safe position with all fall risk precautions in place.

## 2021-02-15 NOTE — DISCHARGE SUMMARY
Discharge Summary        Date of Admission: 02/12/2021  Date of Discharge: 02/14/2021    Admitting Physician: Cottie Halsted; Dr. David Miranda: Internal Medicine; Dr. Sylvester Saldana: 2/12/2021     PREOPERATIVE DIAGNOSES:  1.  L2-S1 degenerative disc disease. 2.  L2-S1 lumbar stenosis with neurogenic claudication  3. L5-S1 spondylolisthesis, grade 1     POSTOPERATIVE DIAGNOSES:  1.  L2-S1 degenerative disc disease. 2.  L2-S1 lumbar stenosis with neurogenic claudication  3. L5-S1 spondylolisthesis, grade 1     OPERATION PERFORMED:  1.  L2-S1 bilateral laminectomy, partial medial facetectomies and foraminotomies of L2, L3, L4, L5, and S1 nerve roots along with total facetectomies at on the left L2-3, L3-4, L4-5, L5-S1 for complete decompression of the left L2, L3, L4, and L5 nerve roots  2.  L2-S1 posterior spinal fusion. 3.  L2-S1 posterior spinal instrumentation, Solera 5.5, Medtronic  4.  Use of local autograft bone      SURGEON: SAWYER Lu     INDICATIONS:  This is a 79 y.o. male with refractory back and left leg pain/thigh numbness from degenerative disc disease and multilevel lumbar stenosis from L2-S1.  Patient has failed full conservative therapy including medication management, physical therapy, and epidural steroid injections. Due to the persistence of symptoms and reduction in the ADLs, patient elected surgical treatment. Patient, therefore, understood indications for the surgery as well as its risks, benefits, and alternatives.  These risks include but are not limited to paralysis, infection, hematoma, dural tear, nerve root injury, nonunion, DVT/PE, stroke, MI, death etc.  All questions were answered. Informed consent was obtained. HOSPITAL COURSE:   The patient was admitted on the above date had the above procedure performed. Patient had no complications during surgery. The patient was then transferred to the PACU and to a regular nursing floor for postop care.  The patient's pain was initially controlled with IV medications, but we were able to transition to oral pain medications soon after arrival to the floor. Their pain remained under good control through their hospital stay. Internal Medicine was consulted for medical management. Patient had improvement of their radicular symptoms. He was doing very well postoperatively. He was working well with PT. He was able to have a BM on POD#2. Hemovac drains were kept in place. Hgb stable at 10.5. The patient had a bowel movement before discharge. The patient was seen by Physical Therapy and was found to be an ideal candidate for discharge to home. The patient was then safely discharged to home on the above date. PHYSICAL EXAMINATION ON DISCHARGE:   The patient was afebrile, stable. Dressing was clean, dry and intact. 5/5 strength in bilateral lower extremities. Sensory intact to touch LE. DISCHARGE INSTRUCTIONS:   Patient can resume regular diet. Resume home medications except for NSAIDs or blood thinners. Resume asa POD#2 and all other blood thinners POD#5. Okay to resume NSAIDs 6 months after surgery. Take previously prescribed narcotic pain medications as directed. Change the dressing daily until the incision is clean and dry and then leave open to air. Okay to take a shower without submerging the incision. Wear the brace at all times when up and ambulating. Limit any heavy lifting, bending or twisting until first postoperative visit. Patient to follow up with Dr. Mary Jo Pringle 6 weeks post discharge as scheduled.      CONDITION AT DISCHARGE:  Stable    DISPOSITION:   Home with [facility] for drain management    Nirmala Carey PA-C

## 2021-12-13 ENCOUNTER — HOSPITAL ENCOUNTER (OUTPATIENT)
Dept: PHYSICAL THERAPY | Age: 71
Setting detail: THERAPIES SERIES
Discharge: HOME | End: 2021-12-13
Payer: MEDICARE

## 2021-12-13 PROCEDURE — 97161 PT EVAL LOW COMPLEX 20 MIN: CPT

## 2021-12-13 PROCEDURE — 97110 THERAPEUTIC EXERCISES: CPT

## 2021-12-13 NOTE — PROGRESS NOTES
** PLEASE SIGN, DATE AND TIME CERTIFICATION BELOW AND RETURN TO [facility] OUTPATIENT REHABILITATION (FAX #: [phone]). ATTEST/CO-SIGN IF ACCESSING VIA [employer]. THANK YOU.**    I certify that I have examined the patient below and determined that Physical Medicine and Rehabilitation service is necessary and that I approve the established plan of care for up to 90 days or as specifically noted. Attestation, signature or co-signature of physician indicates approval of certification requirements.    ________________________ ____________ __________  Physician Signature   Date   Time    [address] [facility]  PHYSICAL THERAPY  [x] EVALUATION  [] DAILY NOTE (LAND) [] DAILY NOTE (AQUATIC ) [] PROGRESS NOTE [] DISCHARGE NOTE    [] [address] [facility]   [x] [place] [address]    [] [facility]   [] [facility]    Date: 2021  Patient Name:  Terrell Bowden  : 1950  MRN: [id number]  CSN: [id number]    Referring Practitioner Julius Flores MD   Diagnosis RIGHT KNEE OA  RIGHT TKR    Treatment Diagnosis R knee pain, R knee stiffness, R knee edema, muscular weakness, difficulty walking   Date of Evaluation 21    Additional Pertinent History December 10, 2021 R TKA, nerve block with immobilizer. R knee meniscus scope many years ago, tried a few cortisone injections. Functional Outcome Measure Used KOOS JR. Functional Outcome Score 22/28 or 78% impaired (21)       Insurance: Primary: Payor: Holly Gregorio /  /  / ,   Secondary: [facility]   Authorization Information: Unlimited visits, aquatic and modalities covered   Visit # 1, 1/10 for progress note   Visits Allowed: Unlimited visits    Recertification Date: 6098   Physician Follow-Up: unknown   Physician Orders:    History of Present Illness:      SUBJECTIVE: Patient c/o 7/10 pain in R knee. Presents with standard walker and knee immobilizer.  Nerve block came out on Saturday. He has been prescribed Norco and extra strength tylenol for pain. Social/Functional History and Current Status:  Medications and Allergies have been reviewed and are listed on Medical History Questionnaire. Amalia Mcfarland lives with spouse in a multiple floor home with ability to complete ADL's on main floor with stairs and a handrail to enter. - built a landing platform next to his steps that his walker fits on. Railing on right     Task Previous Current   ADLs  Independent Modified Independent - sock aid   IADL's Independent Assistance Required   Ambulation Independent Assistance Required - standard walker   Transfers Independent Independent   Recreation Independent Dependent/Unable   Community Integration Independent Assistance Required   Driving Active  Does not drive   Work [employer]. 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        OBJECTIVE:    OBSERVATION    Comments   Pain 7/10 R knee    Posture    Palpation Anterior knee pain   Sensation    Observation Incision covered with surgical dressing   Edema Left knee circumference 15.5 inch  Right knee cirmference 18.5 inch    Patellar Mobility R restricted by edema   Patellar Orientation        LOWER EXTREMITY RANGE OF MOTION    Left Right Comments   Hip Flexion  0-90 deg    Hip Extension      Hip ABDuction  0-45 deg    Hip ADDuction      Hip Internal Rotation      Hip External Rotation      Hip Range of Motion is [place]  []      Knee Flexion  6-75 deg    Knee Extension  Lacks 6 deg    Knee Range of Motion is [place]  []      Ankle Plantarflexion      Ankle Dorsiflexion      Ankle Inversion      Ankle Eversion      Ankle Range of Motion is [place]  [x]       LOWER EXTREMITY STRENGTH    Left Right Comments   Hip Flexion  2/5 SLR    Hip Abduction  3/5     Hip Extension  3/5    Hip Adduction      Knee Flexion  3/5    Knee Extension  2/5 Ankle DF  3/5    Ankle PF  3/5    Ankle Inversion      Ankle Eversion      LE strength is WFL []    GAIT ANALYSIS: Standard walker with step-to pattern leading LLE with knee immobilizer x100 ft. Gait training: SW with step-through pattern, no immobilizer and cues for heel strike and equal step length. Left clinic with immobilizer off. FUNCTIONAL TESTS    Pain No Pain Comments   Stair navigation x     Squat x      Sit to Stand x     Floor transfer          TREATMENT   Precautions: R TKA 12/10/21   Pain:     X in shaded column indicates activity completed today   Modalities Parameters/  Location  Notes                     Manual Therapy Time/Technique  Notes   Patellar mobs Medial/lateral 10x ea x                Exercise/Intervention   Notes   Quad set 5x 5 sec x    SAQ 5x 5 sec x    SLR AAROM 5x  x    sidelying hip abd 10x  x    Heel slide AAROM 5x  x 6-75 deg          Seated knee flexion stretch edge of plinth - foot on floor 3x 10 sec x                                  Specific Interventions Next Treatment: NuStep warm up, patellar mobs, leg raises x3 directions, prone knee curl, seated hamstring stretch along plinth, standing gastroc stretch, standing strengthening, gait training, total gym, standing balance. 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 Activity/Treatment Tolerance:  [x]  Patient tolerated treatment well  []  Patient limited by fatigue  []  Patient limited by pain   []  Patient limited by medical complications  []  Other:     Assessment: Patient presents with R knee pain and stiffness consistent with R TKA on 12/10/21. He demonstrates limited knee flexion mobility as well as weakness requiring use of walker. He will benefit from PT to improve his leg flexibility and strength so he can return to prior level working full time as a farmer, walking long distances and climbing up into large tractors.      Body Structures/Functions/Activity Limitations: edema, impaired balance, impaired ROM, impaired strength, pain, abnormal gait and abnormal posture  Prognosis: good    GOALS:  Patient Goal: I want to get into our family basement for our Memoir party, and return to farming work    Short Term Goals:  Time Frame: 6 weeks    Patient will report decreased R knee pain from baseline 7/10 to less than 4/10 during therapy session in order to stand longer than 20 minutes during household tasks. Patient will improve RLE AROM to 0-80 deg hamstring stretch, and 0-120 deg knee flexion in order to don shoes independently and rise from chair without compensations. Patient will improve standing balance to 30 sec either tandem stance in order to walk without walker. Patient will be IND with HEP in order to meet long term goals. Long Term Goals:  Time Frame:     Patient will improve RLE strength to 4+/5 for hips all directions, knee flex/ext in order to squat, lunge, and climb stairs. 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 Patient will ascend/descend 12 steps with one railing and reciprocal pattern in order to access basement of his house and climb ladders occasionally for work. 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 Patient Education:   [x]  HEP/Education Completed: Plan of Care, Goals, Reviewed exercises given by OIO, advised we will give more standing ones soon.  [email or web address] Access Code:  []  No new Education completed  []  Reviewed Prior HEP      [x]  Patient verbalized and/or demonstrated understanding of education provided. []  Patient unable to verbalize and/or demonstrate understanding of education provided. Will continue education.   []  Barriers to learning:      PLAN:  Treatment Recommendations: Strengthening, Range of Motion, Balance Training, Gait Training, Stair Training, Neuromuscular Re-education, Manual Therapy - Soft Tissue Mobilization, Manual Therapy - Joint Manipulation, Pain Management, Home Exercise Program, Patient Education, Aquatics and Modalities    [x]  Plan of care initiated. Plan to see patient 3 times per week for 12 weeks to address the treatment planned outlined above.   []  Continue with current plan of care  []  Modify plan of care as follows:    []  Hold pending physician visit  []  Discharge    Time In 930   Time Out 1030   Timed Code Minutes: 25 min   Total Treatment Time: 60 min     Electronically Signed by: Esperanza Tam PT

## 2021-12-15 ENCOUNTER — HOSPITAL ENCOUNTER (OUTPATIENT)
Dept: PHYSICAL THERAPY | Age: 71
Setting detail: THERAPIES SERIES
Discharge: HOME | End: 2021-12-15
Payer: MEDICARE

## 2021-12-15 PROCEDURE — 97110 THERAPEUTIC EXERCISES: CPT

## 2021-12-15 NOTE — PROGRESS NOTES
[address] [facility]  PHYSICAL THERAPY  [] EVALUATION  [x] DAILY NOTE (LAND) [] DAILY NOTE (AQUATIC ) [] PROGRESS NOTE [] DISCHARGE NOTE    [] [facility]   [x] [place] [address]    [] [facility]   [] [facility] Stapler    Date: 12/15/2021  Patient Name:  Raul Cameron  : 1950  MRN: [id number]  CSN: [id number]    Referring Practitioner Kan Sims MD   Diagnosis RIGHT KNEE OA  RIGHT TKR    Treatment Diagnosis R knee pain, R knee stiffness, R knee edema, muscular weakness, difficulty walking   Date of Evaluation 21    Additional Pertinent History December 10, 2021 R TKA, nerve block with immobilizer. R knee meniscus scope many years ago, tried a few cortisone injections. Functional Outcome Measure Used KOOS JR. Functional Outcome Score 22/28 or 78% impaired (21)       Insurance: Primary: Payor: Rod Vidales /  /  / ,   Secondary: [employer] [place]   Authorization Information: Unlimited visits, aquatic and modalities covered   Visit # 2, 2/10 for progress note   Visits Allowed: Unlimited visits    Recertification Date: [id number]   Physician Follow-Up: unknown   Physician Orders:    History of Present Illness:      SUBJECTIVE: Patient states pain 2/10 walking into therapy, 7-8/10 when on nustep. Pt reports compliant with HEP. Pt ambulating with standard walker, dicussed adding front wheels after today.     OBJECTIVE:    TREATMENT   Precautions: R TKA 12/10/21   Pain: 2-8/10    X in shaded column indicates activity completed today   Modalities Parameters/  Location  Notes                     Manual Therapy Time/Technique  Notes   Patellar mobs Medial/lateral 10x ea x Good mobility noted               Exercise/Intervention   Notes   nustep L1,  4 min  x    Quad set 10x 5 sec x    SAQ 10x 5 sec x    SLR  2x5  x    sidelying hip abd 10x  x    Heel slide AAROM 10x  x 6-84 deg          Seated knee flexion stretch edge of plint - foot on floor 3x 10 sec x                                  Specific Interventions Next Treatment: NuStep warm up, patellar mobs, leg raises x3 directions, prone knee curl, seated hamstring stretch along plinth, standing gastroc stretch, standing strengthening, gait training, total gym, standing balance. 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 Activity/Treatment Tolerance:  [x]  Patient tolerated treatment well  []  Patient limited by fatigue  []  Patient limited by pain   []  Patient limited by medical complications  []  Other:     Assessment: Patient tolerated session fairly well, mod increased pain noted with knee flexion during Nustep and heel slides. Knee flex AROM did improve since last visit by 9 degrees. Pt to continue with HEP and ice/elevation. GOALS:  Patient Goal: I want to get into our family basement for our [employer], and return to farming work    Short Term Goals:  Time Frame: 6 weeks    Patient will report decreased R knee pain from baseline 7/10 to less than 4/10 during therapy session in order to stand longer than 20 minutes during household tasks. Patient will improve RLE AROM to 0-80 deg hamstring stretch, and 0-120 deg knee flexion in order to don shoes independently and rise from chair without compensations. Patient will improve standing balance to 30 sec either tandem stance in order to walk without walker. Patient will be IND with HEP in order to meet long term goals. Long Term Goals:  Time Frame:     Patient will improve RLE strength to 4+/5 for hips all directions, knee flex/ext in order to squat, lunge, and climb stairs. 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      Patient will ascend/descend 12 steps with one railing and reciprocal pattern in order to access basement of his house and climb ladders

## 2021-12-17 ENCOUNTER — HOSPITAL ENCOUNTER (OUTPATIENT)
Dept: PHYSICAL THERAPY | Age: 71
Setting detail: THERAPIES SERIES
Discharge: HOME | End: 2021-12-17
Payer: MEDICARE

## 2021-12-17 PROCEDURE — 97110 THERAPEUTIC EXERCISES: CPT

## 2021-12-17 NOTE — PROGRESS NOTES
[address] [facility]  PHYSICAL THERAPY  [] EVALUATION  [x] DAILY NOTE (LAND) [] DAILY NOTE (AQUATIC ) [] PROGRESS NOTE [] DISCHARGE NOTE    [] [address] [facility]   [x] [facility]    [] [facility]   [] [facility]    Date: 2021  Patient Name:  Juan Carlos Shaffer  : 1950  MRN: [id number]  CSN: [id number]    Referring Practitioner Donte Dickens MD   Diagnosis RIGHT KNEE OA  RIGHT TKR    Treatment Diagnosis R knee pain, R knee stiffness, R knee edema, muscular weakness, difficulty walking   Date of Evaluation 21    Additional Pertinent History December 10, 2021 R TKA, nerve block with immobilizer. R knee meniscus scope many years ago, tried a few cortisone injections. Functional Outcome Measure Used KOOS JR. Functional Outcome Score 22/28 or 78% impaired (21)       Insurance: Primary: Payor: Urban Khloe /  /  / ,   Secondary: [facility]   Authorization Information: Unlimited visits, aquatic and modalities covered   Visit # 3, 3/10 for progress note   Visits Allowed: Unlimited visits    Recertification Date: [id number]   Physician Follow-Up: unknown   Physician Orders:    History of Present Illness:      SUBJECTIVE: Patient states reporting pain level 2/10 today but pain can range from 0-10 throughout the day.     OBJECTIVE:    TREATMENT   Precautions: R TKA 12/10/21   Pain: 2/10    X in shaded column indicates activity completed today   Modalities Parameters/  Location  Notes                     Manual Therapy Time/Technique  Notes   Patellar mobs Medial/lateral 10x ea x Good mobility noted               Exercise/Intervention   Notes   NuStep L1,  5 min  x    Quad set 15 5 sec x    SAQ 10x 5 sec x    SLR   Hip abduction  2x5  10  x    side lying hip abd 10x  x    Heel slide AAROM 15  x 3-87 deg          Seated knee flexion stretch 10 5 x                                  Specific Interventions Next Treatment: NuStep warm up, patellar mobs, leg raises x3 directions, prone knee curl, seated hamstring stretch along plinth, standing gastroc stretch, standing strengthening, gait training, total gym, standing balance. 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 Activity/Treatment Tolerance:  [x]  Patient tolerated treatment well  []  Patient limited by fatigue  []  Patient limited by pain   []  Patient limited by medical complications  []  Other:     Assessment: Continued to focus on knee range of motion today with patient slowing slight improvement. GOALS:  Patient Goal: I want to get into our family basement for our [employer], and return to farming work    Short Term Goals:  Time Frame: 6 weeks    Patient will report decreased R knee pain from baseline 7/10 to less than 4/10 during therapy session in order to stand longer than 20 minutes during household tasks. Patient will improve RLE AROM to 0-80 deg hamstring stretch, and 0-120 deg knee flexion in order to don shoes independently and rise from chair without compensations. Patient will improve standing balance to 30 sec either tandem stance in order to walk without walker. Patient will be IND with HEP in order to meet long term goals. Long Term Goals:  Time Frame:     Patient will improve RLE strength to 4+/5 for hips all directions, knee flex/ext in order to squat, lunge, and climb stairs. 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 Patient will ascend/descend 12 steps with one railing and reciprocal pattern in order to access basement of his house and climb ladders occasionally for work. 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         Patient Education:   [] HEP/Education Completed: needing a strap for heel slides for HEP.  [employer] Access Code:  []  No new Education completed  [x]  Reviewed Prior HEP      [x]  Patient verbalized and/or demonstrated understanding of education provided. []  Patient unable to verbalize and/or demonstrate understanding of education provided. Will continue education. []  Barriers to learning:      PLAN:  Treatment Recommendations: Strengthening, Range of Motion, Balance Training, Gait Training, Stair Training, Neuromuscular Re-education, Manual Therapy - Soft Tissue Mobilization, Manual Therapy - Joint Manipulation, Pain Management, Home Exercise Program, Patient Education, Aquatics and Modalities    []  Plan of care initiated. Plan to see patient 3 times per week for 12 weeks to address the treatment planned outlined above.   [x]  Continue with current plan of care  []  Modify plan of care as follows:    []  Hold pending physician visit  []  Discharge    Time In 1112   Time Out 1139   Timed Code Minutes: 27 min   Total Treatment Time: 27 min     Electronically Signed by: Mickie Boyd PTA

## 2021-12-20 ENCOUNTER — HOSPITAL ENCOUNTER (OUTPATIENT)
Dept: PHYSICAL THERAPY | Age: 71
Setting detail: THERAPIES SERIES
Discharge: HOME | End: 2021-12-20
Payer: MEDICARE

## 2021-12-20 PROCEDURE — 97110 THERAPEUTIC EXERCISES: CPT

## 2021-12-20 NOTE — PROGRESS NOTES
[address] [facility]  PHYSICAL THERAPY  [] EVALUATION  [x] DAILY NOTE (LAND) [] DAILY NOTE (AQUATIC ) [] PROGRESS NOTE [] DISCHARGE NOTE    [] 5 [facility]   [x] [address] [address]    [] [facility]   [] Marcus Mems    Date: 2021  Patient Name:  Monica Ellsworth  : 1950  MRN: [id number]  CSN: [id number]    Referring Practitioner Cristo Giron MD   Diagnosis RIGHT KNEE OA  RIGHT TKR    Treatment Diagnosis R knee pain, R knee stiffness, R knee edema, muscular weakness, difficulty walking   Date of Evaluation 21    Additional Pertinent History December 10, 2021 R TKA, nerve block with immobilizer. R knee meniscus scope many years ago, tried a few cortisone injections. Functional Outcome Measure Used KOOS JR. Functional Outcome Score 22/28 or 78% impaired (21)       Insurance: Primary: Payor: Clyde Mohan /  /  / ,   Secondary: [address]: Unlimited visits, aquatic and modalities covered   Visit # 4, 4/10 for progress note   Visits Allowed: Unlimited visits    Recertification Date: 4391   Physician Follow-Up: 2022   Physician Orders:    History of Present Illness:      SUBJECTIVE: Patient states reporting pain level 3-4/10 today but pain can range from 0-10 throughout the day. Had stitches removed today and has a little more pain upon arrival. Reports doctor was pleased with everything. Pt ambulating with standard walker.     OBJECTIVE:    TREATMENT   Precautions: R TKA 12/10/21   Pain: 3-4/10    X in shaded column indicates activity completed today   Modalities Parameters/  Location  Notes                     Manual Therapy Time/Technique  Notes   Patellar mobs Medial/lateral 10x ea x Good mobility noted               Exercise/Intervention   Notes   NuStep L1,  5 min  x    Quad set 15 5 sec x VCs for pt to not bend knee between each rep   SAQ 15x 5 sec x    SLR Hip abduction  10, 5x5sec  15  X  x Decreased difficulty with SLR flex today, plan to  Add other directions next visit   side lying hip abd 10x  x    Heel slide AAROM 15  x 6-85 deg          Seated knee flexion stretch 15 5 x                         Gait with 2WW 100ft  x Pt to add wheels to walker     Specific Interventions Next Treatment: NuStep warm up, patellar mobs, leg raises x3 directions, prone knee curl, seated hamstring stretch along plinth, standing gastroc stretch, standing strengthening, gait training, total gym, standing balance. 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 Activity/Treatment Tolerance:  [x]  Patient tolerated treatment well  []  Patient limited by fatigue  []  Patient limited by pain   []  Patient limited by medical complications  []  Other:     Assessment:  Pt tolerated session well, able to increase reps for HEP by 5. Plan to continue with POC and add standing exs next visit. Pt continues with difficulty/pain with quad sets and knee flex. Decreased AROM in knee extension today. Discussed importance of HEP and not having anything under knee. GOALS:  Patient Goal: I want to get into our family basement for our [employer], and return to [employer] work    Short Term Goals:  Time Frame: 6 weeks    Patient will report decreased R knee pain from baseline 7/10 to less than 4/10 during therapy session in order to stand longer than 20 minutes during household tasks. Patient will improve RLE AROM to 0-80 deg hamstring stretch, and 0-120 deg knee flexion in order to don shoes independently and rise from chair without compensations. Patient will improve standing balance to 30 sec either tandem stance in order to walk without walker. Patient will be IND with HEP in order to meet long term goals. Long Term Goals:  Time Frame:     Patient will improve RLE strength to 4+/5 for hips all directions, knee flex/ext in order to squat, lunge, and climb stairs. 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 Patient will ascend/descend 12 steps with one railing and reciprocal pattern in order to access basement of his house and climb ladders occasionally for work. 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 Patient Education:   []  HEP/Education Completed: needing a strap for heel slides for HEP.  [employer] Access Code:  []  No new Education completed  [x]  Reviewed Prior HEP      [x]  Patient verbalized and/or demonstrated understanding of education provided. []  Patient unable to verbalize and/or demonstrate understanding of education provided. Will continue education. []  Barriers to learning:      PLAN:  Treatment Recommendations: Strengthening, Range of Motion, Balance Training, Gait Training, Stair Training, Neuromuscular Re-education, Manual Therapy - Soft Tissue Mobilization, Manual Therapy - Joint Manipulation, Pain Management, Home Exercise Program, Patient Education, Aquatics and Modalities    []  Plan of care initiated. Plan to see patient 3 times per week for 12 weeks to address the treatment planned outlined above.   [x]  Continue with current plan of care  []  Modify plan of care as follows:    []  Hold pending physician visit  []  Discharge    Time In 1530   Time Out 1605   Timed Code Minutes: 35 min   Total Treatment Time:  35min     Electronically Signed by: Caryl Atkinson PTA

## 2021-12-22 ENCOUNTER — HOSPITAL ENCOUNTER (OUTPATIENT)
Dept: PHYSICAL THERAPY | Age: 71
Setting detail: THERAPIES SERIES
Discharge: HOME | End: 2021-12-22
Payer: MEDICARE

## 2021-12-22 PROCEDURE — 97110 THERAPEUTIC EXERCISES: CPT

## 2021-12-22 NOTE — PROGRESS NOTES
[address] [facility]  PHYSICAL THERAPY  [] EVALUATION  [x] DAILY NOTE (LAND) [] DAILY NOTE (AQUATIC ) [] PROGRESS NOTE [] DISCHARGE NOTE    [] 5 [facility]   [x] [place] [address]    [] [facility]   [] Marcus Mems    Date: 2021  Patient Name:  Monica Ellsworth  : 1950  MRN: [id number]  CSN: [id number]    Referring Practitioner Cristo Giron MD   Diagnosis RIGHT KNEE OA  RIGHT TKR    Treatment Diagnosis R knee pain, R knee stiffness, R knee edema, muscular weakness, difficulty walking   Date of Evaluation 21    Additional Pertinent History December 10, 2021 R TKA, nerve block with immobilizer. R knee meniscus scope many years ago, tried a few cortisone injections. Functional Outcome Measure Used KOOS JR. Functional Outcome Score 22/28 or 78% impaired (21)       Insurance: Primary: Payor: Clyde Mohan /  /  / ,   Secondary: [place] [employer] [place]   Authorization Information: Unlimited visits, aquatic and modalities covered   Visit # 5, 5/10 for progress note   Visits Allowed: Unlimited visits    Recertification Date:    Physician Follow-Up: 2022   Physician Orders:    History of Present Illness:      SUBJECTIVE: Patient states mild soreness after last session, pain 2/10 upon arrival, 5-6/10 when bending knee.     OBJECTIVE:     TREATMENT   Precautions: R TKA 12/10/21   Pain: 2-6/10    X in shaded column indicates activity completed today   Modalities Parameters/  Location  Notes                     Manual Therapy Time/Technique  Notes   Patellar mobs Medial/lateral 10x ea x Good mobility noted               Exercise/Intervention   Notes   NuStep L1,  5 min  x    Step stretches HS, knee flex, calf 10 5 sec x Plan to increase hold times next session   Quad set 15 5 sec x VCs for pt to not bend knee between each rep   SAQ 15x 5 sec x    SLR   Hip abduction  10, 5x5sec  15  X  x Decreased difficulty with SLR flex today, plan to  Add other directions next visit   side lying hip abd 10x  x    Heel slide AAROM 15  x 6-90 deg   Prone knee flex 10 5 x    LAQ       Seated knee flexion stretch 15 5 x                         Gait with 2WW 100ft  x Pt to add wheels to walker     Specific Interventions Next Treatment: NuStep warm up, patellar mobs, leg raises x3 directions, prone knee curl, seated hamstring stretch along plinth, standing gastroc stretch, standing strengthening, gait training, total gym, standing balance. 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 Activity/Treatment Tolerance:  [x]  Patient tolerated treatment well  []  Patient limited by fatigue  []  Patient limited by pain   []  Patient limited by medical complications  []  Other:     Assessment:  Pt tolerated session well, added step stretches and prone stretch to assist with knee flexion. Gave HEP, and instructed pt to only do what's tolerable. No increased pain at end of session. 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 Patient will improve RLE AROM to 0-80 deg hamstring stretch, and 0-120 deg knee flexion in order to don shoes independently and rise from chair without compensations. Patient will improve standing balance to 30 sec either tandem stance in order to walk without walker. Patient will be IND with HEP in order to meet long term goals. Long Term Goals:  Time Frame:     Patient will improve RLE strength to 4+/5 for hips all directions, knee flex/ext in order to squat, lunge, and climb stairs.      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 Patient will ascend/descend 12 steps with one railing and reciprocal pattern in order to access basement of his house and climb ladders occasionally for work. 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 Patient Education:   [x]  HEP/Education Completed: Access Code: [email or web address]  URL: [email or web address]  Date: 12/22/2021  Seated Hamstring Stretch - 2 x daily - 7 x weekly - 1 sets - 10 reps - 5 hold  Gastroc Stretch on Wall - 2 x daily - 7 x weekly - 1 sets - 10 reps - 5 hold  Prone Quadriceps Stretch with Strap - 2 x daily - 7 x weekly - 1 sets - 10 reps - 5 hold  Prone Knee Flexion AAROM with Overpressure - 1 x daily - 7 x weekly - 1 sets - 10 reps - 5 hold  []  No new Education completed  [x]  Reviewed Prior HEP      [x]  Patient verbalized and/or demonstrated understanding of education provided. []  Patient unable to verbalize and/or demonstrate understanding of education provided. Will continue education. []  Barriers to learning:      PLAN:  Treatment Recommendations: Strengthening, Range of Motion, Balance Training, Gait Training, Stair Training, Neuromuscular Re-education, Manual Therapy - Soft Tissue Mobilization, Manual Therapy - Joint Manipulation, Pain Management, Home Exercise Program, Patient Education, Aquatics and Modalities    []  Plan of care initiated. Plan to see patient 3 times per week for 12 weeks to address the treatment planned outlined above.   [x]  Continue with current plan of care  []  Modify plan of care as follows:    []  Hold pending physician visit  []  Discharge    Time In 1120   Time Out 1150   Timed Code Minutes: 30 min   Total Treatment Time:  30 min     Electronically Signed by: Osman Bills PTA

## 2021-12-27 ENCOUNTER — HOSPITAL ENCOUNTER (OUTPATIENT)
Dept: PHYSICAL THERAPY | Age: 71
Setting detail: THERAPIES SERIES
Discharge: HOME | End: 2021-12-27
Payer: MEDICARE

## 2021-12-27 PROCEDURE — 97110 THERAPEUTIC EXERCISES: CPT

## 2021-12-27 NOTE — PROGRESS NOTES
[address] [facility]  PHYSICAL THERAPY  [] EVALUATION  [x] DAILY NOTE (LAND) [] DAILY NOTE (AQUATIC ) [] PROGRESS NOTE [] DISCHARGE NOTE    [] [facility]   [x] [place]     [] [facility]   [] Cecelia Monge    Date: 2021  Patient Name:  Terrell Bowden  : 1950  MRN: [id number]  CSN: [id number]    Referring Practitioner Julius Flores MD   Diagnosis RIGHT KNEE OA  RIGHT TKR    Treatment Diagnosis R knee pain, R knee stiffness, R knee edema, muscular weakness, difficulty walking   Date of Evaluation 21    Additional Pertinent History December 10, 2021 R TKA, nerve block with immobilizer. R knee meniscus scope many years ago, tried a few cortisone injections. Functional Outcome Measure Used KOOS JR. Functional Outcome Score 22/28 or 78% impaired (21)       Insurance: Primary: Payor: Holly Gregorio /  /  / ,   Secondary: [place] [employer] [place]   Authorization Information: Unlimited visits, aquatic and modalities covered   Visit # 6, 6/10 for progress note   Visits Allowed: Unlimited visits    Recertification Date: 833   Physician Follow-Up: 2022   Physician Orders:    History of Present Illness:      SUBJECTIVE: Patient states he has been walking without a walker all weekend. Reports he did try to use a cane and felt like it made things worse. States most of the tape strips came off and he had some of the incision open, no discharge noted. Pt instructed to closely monitor incision and not to push knee flex movement. Pain today 2/10. Pt states he is unable to sleep at night without putting a pillow under knee. Discussed importance of not sleeping with pillow under knee. Incision inspected and no concerns at this time.     OBJECTIVE:     TREATMENT   Precautions: R TKA 12/10/21   Pain: 2/10    X in shaded column indicates activity completed today   Modalities Parameters/  Location  Notes Manual Therapy Time/Technique  Notes   Patellar mobs Medial/lateral 10x ea  Good mobility noted               Exercise/Intervention   Notes   NuStep L1,  5 min  x    Step stretches HS, knee flex, calf 5 10 sec x     Quad set 15 5 sec x VCs for pt to not bend knee between each rep   SAQ 20x 5 sec x    SLR flex  Supine Hip abduction  15  15 5 sec x      side lying hip abd and add 10x 5 sec x    Heel slide AAROM 15  x 6-90 deg   Prone knee flex 10 5  Held today d/t incision   LAQ       Seated knee flexion stretch 15 5 x                         Gait without AD 200ft  x VCs for increasing knee flex during swing through, arm swing, forward gaze, erect posture      Specific Interventions Next Treatment: NuStep warm up, patellar mobs, leg raises x3 directions, prone knee curl, seated hamstring stretch along plinth, standing gastroc stretch, standing strengthening, gait training, total gym, standing balance. 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 Activity/Treatment Tolerance:  [x]  Patient tolerated treatment well  []  Patient limited by fatigue  []  Patient limited by pain   []  Patient limited by medical complications  []  Other:     Assessment:  Pt tolerated new exs well, added hip add/abd with min difficulty. Mod mm fatigue noted at end of session. Plan to give HO of new exs next visit if pt tolerates well. Pt to monitor symptoms after. Pt asking about when he can drive, discussed most common factors, but for pt to ultimately contact doctor for recommendation. 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     Patient will improve RLE AROM to 0-80 deg hamstring stretch, and 0-120 deg knee flexion in order to don Education, Aquatics and Modalities    []  Plan of care initiated. Plan to see patient 3 times per week for 12 weeks to address the treatment planned outlined above.   [x]  Continue with current plan of care  []  Modify plan of care as follows:    []  Hold pending physician visit  []  Discharge    Time In 0955   Time Out 1030   Timed Code Minutes: 35 min   Total Treatment Time:   35 min     Electronically Signed by: Clyde Gregory PTA

## 2021-12-29 ENCOUNTER — HOSPITAL ENCOUNTER (OUTPATIENT)
Dept: PHYSICAL THERAPY | Age: 71
Setting detail: THERAPIES SERIES
Discharge: HOME | End: 2021-12-29
Payer: MEDICARE

## 2021-12-29 PROCEDURE — 97110 THERAPEUTIC EXERCISES: CPT

## 2021-12-29 NOTE — PROGRESS NOTES
[address] [facility]  PHYSICAL THERAPY  [] EVALUATION  [x] DAILY NOTE (LAND) [] DAILY NOTE (AQUATIC ) [] PROGRESS NOTE [] DISCHARGE NOTE    [] [address] [facility]   [x] [address]    [] [facility]   [] Shavon Oconnorstone    Date: 2021  Patient Name:  Chitra Finley  : 1950  MRN: [id number]  CSN: [id number]    Referring Practitioner Santos Blank MD   Diagnosis RIGHT KNEE OA  RIGHT TKR    Treatment Diagnosis R knee pain, R knee stiffness, R knee edema, muscular weakness, difficulty walking   Date of Evaluation 21    Additional Pertinent History December 10, 2021 R TKA, nerve block with immobilizer. R knee meniscus scope many years ago, tried a few cortisone injections. Functional Outcome Measure Used KOOS JR. Functional Outcome Score 22/28 or 78% impaired (21)       Insurance: Primary: Payor: Viroprodaljit Maldonado /  /  / ,   Secondary: [employer] [place]   Authorization Information: Unlimited visits, aquatic and modalities covered   Visit # 7, 7/10 for progress note   Visits Allowed: Unlimited visits    Recertification Date: 3/8/8775   Physician Follow-Up: 2022   Physician Orders:    History of Present Illness:      SUBJECTIVE: Patient reports right knee was sore Monday evening after standing 1.5 hours waiting in line to eat. Pt presents without AD. Pt reports he has been working on flexion exercises at home.      OBJECTIVE:     TREATMENT   Precautions: R TKA 12/10/21   Pain: 2/10 right knee    X in shaded column indicates activity completed today   Modalities Parameters/  Location  Notes                     Manual Therapy Time/Technique  Notes   Patellar mobs Medial/lateral 10x ea  Good mobility noted               Exercise/Intervention   Notes   Bike- seat 6 5 min  x rocking   Step stretches HS, knee flex, calf 5 10 sec x  no calf today   Quad set with towel roll under ankle 15 5 sec x VCs for pt to not bend knee between each rep   SAQ 20x 5 sec x    SLR flex  Supine Hip abduction  15  15 5 sec x      side lying hip abd and add 10x 5 sec x ABD only today   Heel slide AAROM 15  x 6-94 deg   Prone knee flex 10 5  Held today d/t incision   LAQ       Seated knee flexion stretch 15 5 x    Prone hang  2 min  x                  Gait without AD 200ft   VCs for increasing knee flex during swing through, arm swing, forward gaze, erect posture      Specific Interventions Next Treatment: NuStep warm up, patellar mobs, leg raises x3 directions, prone knee curl, seated hamstring stretch along plinth, standing gastroc stretch, standing strengthening, gait training, total gym, standing balance. 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 Activity/Treatment Tolerance:  [x]  Patient tolerated treatment well  []  Patient limited by fatigue  []  Patient limited by pain   []  Patient limited by medical complications  []  Other:     Assessment:  Progressed to bike, doing partial revolutions, and add prone hang to improve ROM. Right knee flexion ROM improved 4 degrees. 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 Patient will improve RLE AROM to 0-80 deg hamstring stretch, and 0-120 deg knee flexion in order to don shoes independently and rise from chair without compensations. Patient will improve standing balance to 30 sec either tandem stance in order to walk without walker. Patient will be IND with HEP in order to meet long term goals. Long Term Goals:  Time Frame:     Patient will improve RLE strength to 4+/5 for hips all directions, knee flex/ext in order to squat, lunge, and climb stairs.      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 Patient will ascend/descend 12 steps with one railing and reciprocal pattern in order to access basement of his house and climb ladders occasionally for work. 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 Patient Education:   [x]  HEP/Education Completed: keep legs equal for sit to stand transfers, heel/toe gait pattern, focus on ROM  Access Code: [email or web address]  URL: [email or web address]  Date: 12/22/2021  Seated Hamstring Stretch - 2 x daily - 7 x weekly - 1 sets - 10 reps - 5 hold  Gastroc Stretch on Wall - 2 x daily - 7 x weekly - 1 sets - 10 reps - 5 hold  Prone Quadriceps Stretch with Strap - 2 x daily - 7 x weekly - 1 sets - 10 reps - 5 hold  Prone Knee Flexion AAROM with Overpressure - 1 x daily - 7 x weekly - 1 sets - 10 reps - 5 hold  []  No new Education completed  [x]  Reviewed Prior HEP      [x]  Patient verbalized and/or demonstrated understanding of education provided. []  Patient unable to verbalize and/or demonstrate understanding of education provided. Will continue education. []  Barriers to learning:      PLAN:  Treatment Recommendations: Strengthening, Range of Motion, Balance Training, Gait Training, Stair Training, Neuromuscular Re-education, Manual Therapy - Soft Tissue Mobilization, Manual Therapy - Joint Manipulation, Pain Management, Home Exercise Program, Patient Education, Aquatics and Modalities    []  Plan of care initiated. Plan to see patient 3 times per week for 12 weeks to address the treatment planned outlined above.   [x]  Continue with current plan of care  []  Modify plan of care as follows:    []  Hold pending physician visit  []  Discharge    Time In 1146   Time Out 1216   Timed Code Minutes: 30 min

## 2022-01-03 ENCOUNTER — HOSPITAL ENCOUNTER (OUTPATIENT)
Dept: PHYSICAL THERAPY | Age: 72
Setting detail: THERAPIES SERIES
Discharge: HOME | End: 2022-01-03
Payer: MEDICARE

## 2022-01-03 PROCEDURE — 97110 THERAPEUTIC EXERCISES: CPT

## 2022-01-03 NOTE — PROGRESS NOTES
[facility]  PHYSICAL THERAPY  [] EVALUATION  [x] DAILY NOTE (LAND) [] DAILY NOTE (AQUATIC ) [] PROGRESS NOTE [] DISCHARGE NOTE    [] [facility]   [x] [place] [address]    [] [facility]   [] Tawanna Galan    Date: 1/3/2022  Patient Name:  Nhung Cooper  : 1950  MRN: [id number]  CSN: [id number]    Referring Practitioner Anat Rausch MD   Diagnosis RIGHT KNEE OA  RIGHT TKR    Treatment Diagnosis R knee pain, R knee stiffness, R knee edema, muscular weakness, difficulty walking   Date of Evaluation 21    Additional Pertinent History December 10, 2021 R TKA, nerve block with immobilizer. R knee meniscus scope many years ago, tried a few cortisone injections. Functional Outcome Measure Used KOOS JR. Functional Outcome Score 22/28 or 78% impaired (21)       Insurance: Primary: Payor: Luis Fuentes /  /  / ,   Secondary: [employer] [place]   Authorization Information: Unlimited visits, aquatic and modalities covered   Visit # 8, 8/10 for progress note   Visits Allowed: Unlimited visits    Recertification Date: 5/3/7989   Physician Follow-Up: 2022   Physician Orders:    History of Present Illness:      SUBJECTIVE: Pt states pain is usually around 2/10, unless pushing the range of motion. Pt states not doing many exs over the weekend, Did go a couple days without wearing compression hose and noticed increased pain. Started taking pain medication again,mostly to assist with sleeping and during tx sessions. Pt ambulating into clinic with no assisted device.     OBJECTIVE:     TREATMENT   Precautions: R TKA 12/10/21   Pain: 2/10 right knee    X in shaded column indicates activity completed today   Modalities Parameters/  Location  Notes                     Manual Therapy Time/Technique  Notes   Patellar mobs Medial/lateral 10x ea  Good mobility noted               Exercise/Intervention   Notes   Bike- seat 6 5 min  x rocking   Step stretches HS, knee flex, calf 5 10 sec x  no calf today   Quad set with towel roll under ankle 15 5 sec x VCs for pt to not bend knee between each rep   SAQ 20x 5 sec     SLR flex  Supine Hip abduction  15  15 5 sec x      side lying hip abd and add 15x 5 sec x ABD only today   Heel slide AAROM 15  x 4-100 deg   Prone knee flex stretch with strap 10 5  Held today d/t incision   LAQ       Seated knee flexion stretch 15 5     Prone hang   Prone leg extension 2 min  10x   3 sec X  x    Total gym 15  x    Standing hip abd, HR, hamstring curl, mini squats 10  x All except squats   Gait without AD 200ft   VCs for increasing knee flex during swing through, arm swing, forward gaze, erect posture      Specific Interventions Next Treatment: NuStep warm up, patellar mobs, leg raises x3 directions, prone knee curl, seated hamstring stretch along plinth, standing gastroc stretch, standing strengthening, gait training, total gym, standing balance. 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 Activity/Treatment Tolerance:  [x]  Patient tolerated treatment well  []  Patient limited by fatigue  []  Patient limited by pain   []  Patient limited by medical complications  []  Other:     Assessment:  Pt tolerated session well, pt continues to improve knee AROM. Added new exs to POC; total gym squats and standing exs. Difficulty noted with standing HS curl and some discomfort with SLS R for hip abd. Pt to continue with HEP and ice as needed. 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     Patient will improve RLE AROM to 0-80 deg hamstring stretch, and 0-120 deg knee flexion in order to don shoes independently and rise from chair without compensations. Patient will improve standing balance to 30 sec either tandem stance in order to walk without walker. Patient will be IND with HEP in order to meet long term goals. Long Term Goals:  Time Frame:     Patient will improve RLE strength to 4+/5 for hips all directions, knee flex/ext in order to squat, lunge, and climb stairs. 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 Patient will ascend/descend 12 steps with one railing and reciprocal pattern in order to access basement of his house and climb ladders occasionally for work. 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 Patient Education:   []  HEP/Education Completed:   []  No new Education completed  [x]  Reviewed Prior HEP      [x]  Patient verbalized and/or demonstrated understanding of education provided. []  Patient unable to verbalize and/or demonstrate understanding of education provided. Will continue education. []  Barriers to learning:      PLAN:  Treatment Recommendations: Strengthening, Range of Motion, Balance Training, Gait Training, Stair Training, Neuromuscular Re-education, Manual Therapy - Soft Tissue Mobilization, Manual Therapy - Joint Manipulation, Pain Management, Home Exercise Program, Patient Education, Aquatics and Modalities    []  Plan of care initiated. Plan to see patient 3 times per week for 12 weeks to address the treatment planned outlined above.   [x]  Continue with current plan of care  []  Modify plan of care as follows:    []  Hold pending physician visit  []  Discharge    Time In 1400   Time Out 1430   Timed Code Minutes: 30 min   Total Treatment Time:   30 min     Electronically Signed by: Vesna Andre PTA

## 2022-01-05 ENCOUNTER — HOSPITAL ENCOUNTER (OUTPATIENT)
Dept: PHYSICAL THERAPY | Age: 72
Setting detail: THERAPIES SERIES
Discharge: HOME | End: 2022-01-05
Payer: MEDICARE

## 2022-01-05 PROCEDURE — 97110 THERAPEUTIC EXERCISES: CPT

## 2022-01-05 NOTE — PROGRESS NOTES
[address] [facility]  PHYSICAL THERAPY  [] EVALUATION  [x] DAILY NOTE (LAND) [] DAILY NOTE (AQUATIC ) [] PROGRESS NOTE [] DISCHARGE NOTE    [] [facility]   [x] [facility]    [] [facility]   [] Albino Slate    Date: 2022  Patient Name:  Jossie Squires  : 1950  MRN: [id number]  CSN: [id number]    Referring Practitioner Carina White MD   Diagnosis RIGHT KNEE OA  RIGHT TKR    Treatment Diagnosis R knee pain, R knee stiffness, R knee edema, muscular weakness, difficulty walking   Date of Evaluation 21    Additional Pertinent History December 10, 2021 R TKA, nerve block with immobilizer. R knee meniscus scope many years ago, tried a few cortisone injections. Functional Outcome Measure Used KOOS JR. Functional Outcome Score 22/28 or 78% impaired (21)       Insurance: Primary: Payor: Linnea Antonio /  /  / ,   Secondary: [employer] [place]   Authorization Information: Unlimited visits, aquatic and modalities covered   Visit # 9, 9/10 for progress note   Visits Allowed: Unlimited visits    Recertification Date:    Physician Follow-Up: 2022   Physician Orders:    History of Present Illness:      SUBJECTIVE: Pt reports pain has been relatively low, did have some increased soreness after last visit. Has been using a muscle massage tool for soreness in quads that seems to be helping to lessen muscle pain/soreness.  Pt reports 2/10 pain with walking, 0/10 at rest.    OBJECTIVE:     TREATMENT   Precautions: R TKA 12/10/21   Pain: 0-2/10 right knee    X in shaded column indicates activity completed today   Modalities Parameters/  Location  Notes                     Manual Therapy Time/Technique  Notes   Patellar mobs Medial/lateral 10x ea  Good mobility noted               Exercise/Intervention   Notes   Bike- seat 6 5 min  x Rocking first 3 min, retro full last 2 min   Step stretches HS, knee flex, calf 5 10 sec x     Quad set with towel roll under ankle 15 5 sec  VCs for pt to not bend knee between each rep   SAQ 20x 5 sec     SLR flex  Supine Hip abduction  15  15 5 sec x      side lying hip abd and add 15x 5 sec x ABD only today   Heel slide AAROM 15  x 4-102 deg   Prone knee flex stretch with strap 10 5 x    LAQ       Seated knee flexion stretch 15 5     Prone hang   Prone hip extension lift 2 min  10x   3 sec X  x 10 sec manual hold by PTA  VCs for proper form   Total gym 15 3 sec x    Standing hip abd, HR, hamstring curl, mini squats, march 10  x All except squats, R LE only   Step-ups 4inch       Nautilus leg press              Gait without AD 200ft   VCs for increasing knee flex during swing through, arm swing, forward gaze, erect posture      Specific Interventions Next Treatment: NuStep warm up, patellar mobs, leg raises x3 directions, prone knee curl, seated hamstring stretch along plinth, standing gastroc stretch, standing strengthening, gait training, total gym, standing balance. 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 Activity/Treatment Tolerance:  [x]  Patient tolerated treatment well  []  Patient limited by fatigue  []  Patient limited by pain   []  Patient limited by medical complications  []  Other:     Assessment:  Progressed with AAROM today, to 102 degrees but continues to be very tight and limited. Discussed importance of prone exs to assist with improving AROM and decreasing mm soreness. Plan to continue to progress with standing exs as tolerable next visit. 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     Patient will improve RLE AROM to 0-80 deg hamstring stretch, and 0-120 deg knee flexion in order to don shoes independently and rise from chair without compensations. Patient will improve standing balance to 30 sec either tandem stance in order to walk without walker. Patient will be IND with HEP in order to meet long term goals. Long Term Goals:  Time Frame:     Patient will improve RLE strength to 4+/5 for hips all directions, knee flex/ext in order to squat, lunge, and climb stairs. 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 Patient will ascend/descend 12 steps with one railing and reciprocal pattern in order to access basement of his house and climb ladders occasionally for work. 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 Patient Education:   []  HEP/Education Completed:   []  No new Education completed  [x]  Reviewed Prior HEP      [x]  Patient verbalized and/or demonstrated understanding of education provided. []  Patient unable to verbalize and/or demonstrate understanding of education provided. Will continue education. []  Barriers to learning:      PLAN:  Treatment Recommendations: Strengthening, Range of Motion, Balance Training, Gait Training, Stair Training, Neuromuscular Re-education, Manual Therapy - Soft Tissue Mobilization, Manual Therapy - Joint Manipulation, Pain Management, Home Exercise Program, Patient Education, Aquatics and Modalities    []  Plan of care initiated. Plan to see patient 3 times per week for 12 weeks to address the treatment planned outlined above.   [x]  Continue with current plan of care  []  Modify plan of care as follows:    []  Hold pending physician visit  []  Discharge    Time In 1100   Time Out 1145   Timed Code Minutes: 45 min   Total Treatment Time:   45 min Electronically Signed by: Maddy Meade PTA

## 2022-01-07 ENCOUNTER — HOSPITAL ENCOUNTER (OUTPATIENT)
Dept: PHYSICAL THERAPY | Age: 72
Setting detail: THERAPIES SERIES
Discharge: HOME | End: 2022-01-07
Payer: MEDICARE

## 2022-01-07 PROCEDURE — 97110 THERAPEUTIC EXERCISES: CPT

## 2022-01-07 NOTE — PROGRESS NOTES
[facility]  [facility]  [] EVALUATION  [] DAILY NOTE (LAND) [] DAILY NOTE (AQUATIC ) [x] PROGRESS NOTE [] DISCHARGE NOTE    [] [facility]   [x] [address]    [] [facility]   [] [facility] [facility]    Date: 2022  Patient Name:  Wisam Graham  : 1950  MRN: [id number]  CSN: [id number]    Referring Practitioner Selwyn Zapata MD   Diagnosis RIGHT KNEE OA  RIGHT TKR    Treatment Diagnosis R knee pain, R knee stiffness, R knee edema, muscular weakness, difficulty walking   Date of Evaluation 21    Additional Pertinent History December 10, 2021 R TKA, nerve block with immobilizer. R knee meniscus scope many years ago, tried a few cortisone injections. Functional Outcome Measure Used KOOS JR. Functional Outcome Score 22/28 or 78% impaired (21)       Insurance: Primary: Payor: Maria Esther Alberto /  /  / ,   Secondary: [employer] [place]   Authorization Information: Unlimited visits, aquatic and modalities covered   Visit # 10, 0/10 for progress note   Visits Allowed: Unlimited visits    Recertification Date: 8/3/4685   Physician Follow-Up: 2022   Physician Orders:    History of Present Illness:      SUBJECTIVE: Pt feels knee is getting a little more movement. Pt reports slight pain in knee walking into clinic. Pt notes right hip was sore last night causing difficulty sleeping. Pt had hip issues prior to knee surgery but was hoping knee surgery would resolve hip issues. Pt does note more flexibility bending over to lorena socks.        OBJECTIVE:     TREATMENT   Precautions: R TKA 12/10/21   Pain: 0-2/10 right knee    X in shaded column indicates activity completed today   Modalities Parameters/  Location  Notes                     Manual Therapy Time/Technique  Notes   Patellar mobs Medial/lateral 10x ea  Good mobility noted               Exercise/Intervention   Notes   Bike- seat 6 5 min  x Rocking first , then retro full revolutions to forward revolution for last minute   Step stretches HS, knee flex, calf 3 15 sec x     Quad set with towel roll under ankle 15 5 sec x    SAQ 20x 5 sec     SLR flex  Supine Hip abduction  15  15 5 sec x      side lying hip abd and add 15x 5 sec  ABD only today   Heel slide AAROM 15  x 3-105 deg   Prone knee flex stretch with strap 10 5     LAQ       Seated knee flexion stretch 15 5     Prone hang   Prone hip extension lift 2 min  10x   3 sec X  x 5x5-8 sec manual hold by PT  VCs for proper form   Total gym 15 3 sec x    Standing hip abd R, HR, hamstring curl R, mini squats, march B   x    Step-ups 4inch       Nautilus leg press              Gait without AD 200ft   VCs for increasing knee flex during swing through, arm swing, forward gaze, erect posture      Specific Interventions Next Treatment: NuStep warm up, patellar mobs, leg raises x3 directions, prone knee curl, seated hamstring stretch along plinth, standing gastroc stretch, standing strengthening, gait training, total gym, standing balance. 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 Activity/Treatment Tolerance:  [x]  Patient tolerated treatment well  []  Patient limited by fatigue  []  Patient limited by pain   []  Patient limited by medical complications  []  Other:     Assessment:  Pt demos good progress toward goals. Pain and swelling worse at night. Pt tolerates therapy sessions without significant increase in pain. Pt demos improved hamstring flexibility and knee ROM but knee still limited. Pt able to walk without AD but demos mild antalgic pattern, lacking TKE on right at heel strike. Pt demos good hip and knee strength with MMT but weakness noted during squats and climbing stairs. Pt will benefit from continued PT to further address ROM, strength, and gait to normalize mobility.        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 GOAL MET:  Pt reports he has times of zero pain, moderate pain with therapy, and 7-8/10 worst pain at night, but tolerates walking >20 minutes without AD. Discontinue Goal    Patient will improve RLE AROM to 0-80 deg hamstring stretch, and 0-120 deg knee flexion in order to don shoes independently and rise from chair without compensations. GOAL NOT MET: right hamstring 0-90 deg, right knee 3-105 deg; donning socks and shoes is much easier. Discontinue Goal    Patient will improve standing balance to 30 sec either tandem stance in order to walk without walker. GOAL NOT MET: tandem balance 5 sec, but walks without AD. Discontinue Goal    Patient will be IND with HEP in order to meet long term goals. GOAL MET:  Pt reports compliance with HEP once a day but spaced out thorughout the day. Discontinue Goal      Long Term Goals:  Time Frame:     Patient will improve RLE strength to 4+/5 for hips all directions, knee flex/ext in order to squat, lunge, and climb stairs. GOAL NOT MET: right hip 4+/5, knee 4+/5. 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 ONGOING-not measured this date    Patient will ascend/descend 12 steps with one railing and reciprocal pattern in order to access basement of his house and climb ladders occasionally for work. GOAL NOT MET: Pt negotiates basement steps non-reciprocally with 1 handrail about once a day.   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 ONGOING    NEW GOAL: Patient will improve right knee ROM 0-120 degrees to normalize gait and ease squatting. Patient Education:   [x]  HEP/Education Completed: negotiate stairs reciprocally  []  No new Education completed  [x]  Reviewed Prior HEP      [x]  Patient verbalized and/or demonstrated understanding of education provided. []  Patient unable to verbalize and/or demonstrate understanding of education provided. Will continue education. []  Barriers to learning:      PLAN:  Treatment Recommendations: Strengthening, Range of Motion, Balance Training, Gait Training, Stair Training, Neuromuscular Re-education, Manual Therapy - Soft Tissue Mobilization, Manual Therapy - Joint Manipulation, Pain Management, Home Exercise Program, Patient Education, Aquatics and Modalities    []  Plan of care initiated. Plan to see patient 3 times per week for 12 weeks to address the treatment planned outlined above.   [x]  Continue with current plan of care  []  Modify plan of care as follows:    []  Hold pending physician visit  []  Discharge    Time In 1100   Time Out 1147   Timed Code Minutes: 47 min   Total Treatment Time:   47 min     Electronically Signed by: Bala Mares PT

## 2022-01-11 ENCOUNTER — HOSPITAL ENCOUNTER (OUTPATIENT)
Dept: PHYSICAL THERAPY | Age: 72
Setting detail: THERAPIES SERIES
Discharge: HOME | End: 2022-01-11
Payer: MEDICARE

## 2022-01-11 PROCEDURE — 97110 THERAPEUTIC EXERCISES: CPT

## 2022-01-11 NOTE — PROGRESS NOTES
[facility] [facility]  PHYSICAL THERAPY  [] EVALUATION  [x] DAILY NOTE (LAND) [] DAILY NOTE (AQUATIC ) [] PROGRESS NOTE [] DISCHARGE NOTE    [] [facility]   [x] [facility]    [facility]   [] Esau Velez    Date: 2022  Patient Name:  Refugio Parra  : 1950  MRN: [id number]  CSN: [id number]    Referring Practitioner Camille Sutton MD   Diagnosis Right knee pain  Right TKA    Treatment Diagnosis R knee pain, R knee stiffness, R knee edema, muscular weakness, difficulty walking   Date of Evaluation 21    Additional Pertinent History December 10, 2021 R TKA, nerve block with immobilizer. R knee meniscus scope many years ago, tried a few cortisone injections. Functional Outcome Measure Used KOOS JR. Functional Outcome Score 22/28 or 78% impaired (21)       Insurance: Primary: Payor: Danay Muse /  /  / ,   Secondary: [employer] [place]   Authorization Information: Unlimited visits, aquatic and modalities covered   Visit # 11, 1/10 for progress note   Visits Allowed: Unlimited visits    Recertification Date: [id number]   Physician Follow-Up: 2022   Physician Orders:    History of Present Illness:      SUBJECTIVE: Pt reports he is unsure if he feels actual pain or body responding to foreign object of prosthesis. Pt denies pain walking into clinic. Pt called doctor yesterday regarding continued pain, especially at night, and was prescribed pain med to take at night and prior to therapy that he will  today. Pt notes he has been working on stairs.      OBJECTIVE:     TREATMENT   Precautions: R TKA 12/10/21   Pain: 0/10 right knee walking into clinic, 2-3/10 bending knee    X in shaded column indicates activity completed today   Modalities Parameters/  Location  Notes                     Manual Therapy Time/Technique  Notes   Patellar mobs Medial/lateral 10x ea  Good mobility noted Exercise/Intervention   Notes   Bike- seat 6 5 min  x Rocking first , then retro full revolutions to forward revolution for last minute   Step stretches HS, knee flex, calf 3 15 sec x     Quad set with towel roll under ankle 15 5 sec x 5x15 sec today d/t patient not understanding directions   SAQ 20x 5 sec     SLR flex  Supine Hip abduction  15  15 5 sec       side lying hip abd and add 15x 5 sec  ABD only today   Heel slide AAROM 15  x 3-108 deg   Prone knee flex stretch with strap 10 5     LAQ       Seated knee flexion stretch 15 5     Prone hang   Prone hip extension lift 2 min  10x   3 sec X   5x5-8 sec manual hold by PT  VCs for proper form   Total gym 20  x    Standing hip abd B, HR/TR, hamstring curl R, mini squats, march B 15  x    Step-ups 4inch- forward, lateral RCC 10  x Increase to 6 inch step next session   Nautilus leg press              Gait without AD 200ft   VCs for increasing knee flex during swing through, arm swing, forward gaze, erect posture      Specific Interventions Next Treatment: NuStep warm up, patellar mobs, leg raises x3 directions, prone knee curl, seated hamstring stretch along plinth, standing gastroc stretch, standing strengthening, gait training, total gym, standing balance. 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 Activity/Treatment Tolerance:  [x]  Patient tolerated treatment well  []  Patient limited by fatigue  []  Patient limited by pain   []  Patient limited by medical complications  []  Other:     Assessment:  Pt continues to make gains in right knee flexion but extension still lacks 3 degrees. Introduced 4 inch step ups with patient noting it was too easy; plan to progress to 6 inch step next session. 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 Patient will ascend/descend 12 steps with one railing and reciprocal pattern in order to access basement of his house and climb ladders occasionally for work. 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 Patient will improve right knee ROM 0-120 degrees to normalize gait and ease squatting. Patient Education:   []  HEP/Education Completed: negotiate stairs reciprocally  []  No new Education completed  [x]  Reviewed Prior HEP      [x]  Patient verbalized and/or demonstrated understanding of education provided. []  Patient unable to verbalize and/or demonstrate understanding of education provided. Will continue education. []  Barriers to learning:      PLAN:  Treatment Recommendations: Strengthening, Range of Motion, Balance Training, Gait Training, Stair Training, Neuromuscular Re-education, Manual Therapy - Soft Tissue Mobilization, Manual Therapy - Joint Manipulation, Pain Management, Home Exercise Program, Patient Education, Aquatics and Modalities    []  Plan of care initiated. Plan to see patient 3 times per week for 12 weeks to address the treatment planned outlined above.   [x]  Continue with current plan of care  []  Modify plan of care as follows:    []  Hold pending physician visit  []  Discharge    Time In 0931   Time Out 1000   Timed Code Minutes: 29 min   Total Treatment Time:   29 min     Electronically Signed by: Candis Vo PT

## 2022-01-12 ENCOUNTER — HOSPITAL ENCOUNTER (OUTPATIENT)
Dept: PHYSICAL THERAPY | Age: 72
Setting detail: THERAPIES SERIES
Discharge: HOME | End: 2022-01-12
Payer: MEDICARE

## 2022-01-12 PROCEDURE — 97110 THERAPEUTIC EXERCISES: CPT

## 2022-01-12 NOTE — PROGRESS NOTES
[address] [facility]  PHYSICAL THERAPY  [] EVALUATION  [x] DAILY NOTE (LAND) [] DAILY NOTE (AQUATIC ) [] PROGRESS NOTE [] DISCHARGE NOTE    [] [facility]   [x] [place] 90    [] [facility]   [] Naldo Houston    Date: 2022  Patient Name:  Lydia Saab  : 1950  MRN: [id number]  CSN: [id number]    Referring Practitioner Rema Lane MD   Diagnosis Right knee pain  Right TKA    Treatment Diagnosis R knee pain, R knee stiffness, R knee edema, muscular weakness, difficulty walking   Date of Evaluation 21    Additional Pertinent History December 10, 2021 R TKA, nerve block with immobilizer. R knee meniscus scope many years ago, tried a few cortisone injections. Functional Outcome Measure Used KOOS JR. Functional Outcome Score 22/28 or 78% impaired (21)       Insurance: Primary: Payor: Nir Silveira /  /  / ,   Secondary: [address]: Unlimited visits, aquatic and modalities covered   Visit # 12, 2/10 for progress note   Visits Allowed: Unlimited visits    Recertification Date: [id number]   Physician Follow-Up: 2022   Physician Orders:    History of Present Illness:      SUBJECTIVE: [facility] so didn't get ice on it right away, which caused knee to swell up. Pt denies pain walking into clinic this morning.  Pt took Tramadol this morning at ~8am.     OBJECTIVE:     TREATMENT   Precautions: R TKA 12/10/21   Pain: 0/10 right knee walking into clinic, 2-3/10 bending knee    X in shaded column indicates activity completed today   Modalities Parameters/  Location  Notes                     Manual Therapy Time/Technique  Notes   Patellar mobs Medial/lateral 10x ea  Good mobility noted               Exercise/Intervention   Notes   Bike- seat 6 5 min  x Rocking first , then retro full revolutions to forward revolution for last minute Step stretches HS, knee flex, calf 3 15 sec x     Quad set with towel roll under ankle 20 5 sec x    SAQ 20x 5 sec x    SLR flex  Supine Hip abduction  15  15 5 sec x      side lying hip abd  15x 3 sec x    Heel slide AAROM 20 3 sec x 3-110 deg   Prone knee flex stretch with strap 10 5 sec x    LAQ       Seated knee flexion stretch 15 5     Prone hang   Prone hip extension lift 2 min  15x   3 sec X  x 2# ankle weight   Total gym 20  x    Standing hip abd B, HR/TR, hamstring curl R, mini squats, march B, hip ext 15  x    Step-ups 6 inch- forward, lateral RCC 10  x    Nautilus leg press R 10 40# x    Nautilus leg extension R 10 15#              Specific Interventions Next Treatment: NuStep warm up, patellar mobs, leg raises x3 directions, prone knee curl, seated hamstring stretch along plinth, standing gastroc stretch, standing strengthening, gait training, total gym, standing balance. 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 Activity/Treatment Tolerance:  [x]  Patient tolerated treatment well  []  Patient limited by fatigue  []  Patient limited by pain   []  Patient limited by medical complications  []  Other:     Assessment:  Introduced standing hip extension, Nautlius leg press and leg extension for strengthening with good tolerance. Right knee flexion continues to improve, extension is slowly loosening up. Pt ambulating with improved heel strike. No adverse effects to session. 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 Patient will ascend/descend 12 steps with one railing and reciprocal pattern in order to access basement of his house and climb ladders occasionally for work. 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 Patient will improve right knee ROM 0-120 degrees to normalize gait and ease squatting. Patient Education:   []  HEP/Education Completed: negotiate stairs reciprocally  []  No new Education completed  [x]  Reviewed Prior HEP      [x]  Patient verbalized and/or demonstrated understanding of education provided. []  Patient unable to verbalize and/or demonstrate understanding of education provided. Will continue education. []  Barriers to learning:      PLAN:  Treatment Recommendations: Strengthening, Range of Motion, Balance Training, Gait Training, Stair Training, Neuromuscular Re-education, Manual Therapy - Soft Tissue Mobilization, Manual Therapy - Joint Manipulation, Pain Management, Home Exercise Program, Patient Education, Aquatics and Modalities    []  Plan of care initiated. Plan to see patient 3 times per week for 12 weeks to address the treatment planned outlined above.   [x]  Continue with current plan of care  []  Modify plan of care as follows:    []  Hold pending physician visit  []  Discharge    Time In 0911   Time Out 0955   Timed Code Minutes: 44 min   Total Treatment Time:   44 min     Electronically Signed by: Amee Negrete PT

## 2022-01-14 ENCOUNTER — HOSPITAL ENCOUNTER (OUTPATIENT)
Dept: PHYSICAL THERAPY | Age: 72
Setting detail: THERAPIES SERIES
Discharge: HOME | End: 2022-01-14
Payer: MEDICARE

## 2022-01-14 PROCEDURE — 97110 THERAPEUTIC EXERCISES: CPT

## 2022-01-14 NOTE — PROGRESS NOTES
ankle 20 5 sec x    SAQ 20x 5 sec x    SLR flex  Supine Hip abduction  15  15 5 sec x      side lying hip abd  15x 3 sec x    Heel slide AAROM 20 3 sec x 3-111 deg   Prone knee flex stretch with strap 10 5 sec            Seated knee flexion stretch 15 5     Prone hang   Prone hip extension lift 2 min  15x   3 sec X  x 2# ankle weight   Total gym 20  x    Standing hip abd B, HR/TR, hamstring curl R, mini squats, march B, hip ext 15  x    Step-ups 6 inch- forward, lateral RCC 10  x    Nautilus leg press R 2x10 40# x    Nautilus leg extension R 2x10 15# x             Specific Interventions Next Treatment: NuStep warm up, patellar mobs, leg raises x3 directions, prone knee curl, seated hamstring stretch along plinth, standing gastroc stretch, standing strengthening, gait training, total gym, standing balance. 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 Activity/Treatment Tolerance:  [x]  Patient tolerated treatment well  []  Patient limited by fatigue  []  Patient limited by pain   []  Patient limited by medical complications  []  Other:     Assessment:  Progressed reps with Nautilus machines. Right knee flexion improved 1 degree. Pt continues to progress toward goals. 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 Patient will ascend/descend 12 steps with one railing and reciprocal pattern in order to access basement of his house and climb ladders occasionally for work.      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 Patient will improve right knee ROM 0-120 degrees to normalize gait and ease squatting. Patient Education:   []  HEP/Education Completed: negotiate stairs reciprocally  []  No new Education completed  [x]  Reviewed Prior HEP      [x]  Patient verbalized and/or demonstrated understanding of education provided. []  Patient unable to verbalize and/or demonstrate understanding of education provided. Will continue education. []  Barriers to learning:      PLAN:  Treatment Recommendations: Strengthening, Range of Motion, Balance Training, Gait Training, Stair Training, Neuromuscular Re-education, Manual Therapy - Soft Tissue Mobilization, Manual Therapy - Joint Manipulation, Pain Management, Home Exercise Program, Patient Education, Aquatics and Modalities    []  Plan of care initiated. Plan to see patient 3 times per week for 12 weeks to address the treatment planned outlined above.   [x]  Continue with current plan of care  []  Modify plan of care as follows:    []  Hold pending physician visit  []  Discharge    Time In 0843   Time Out 0921   Timed Code Minutes: 38 min   Total Treatment Time:   38 min     Electronically Signed by: Shaun Rogers PT

## 2022-01-17 ENCOUNTER — APPOINTMENT (OUTPATIENT)
Dept: PHYSICAL THERAPY | Age: 72
End: 2022-01-17
Payer: MEDICARE

## 2022-01-18 ENCOUNTER — HOSPITAL ENCOUNTER (OUTPATIENT)
Dept: PHYSICAL THERAPY | Age: 72
Setting detail: THERAPIES SERIES
Discharge: HOME | End: 2022-01-18
Payer: MEDICARE

## 2022-01-18 PROCEDURE — 97110 THERAPEUTIC EXERCISES: CPT

## 2022-01-18 NOTE — PROGRESS NOTES
[address] [facility]  PHYSICAL THERAPY  [] EVALUATION  [x] DAILY NOTE (LAND) [] DAILY NOTE (AQUATIC ) [] PROGRESS NOTE [] DISCHARGE NOTE    [] [address] [facility]   [x] [place] [address]    [] [facility]   [] Nabila Martin    Date: 2022  Patient Name:  Magy Page  : 1950  MRN: [id number]  CSN: [id number]    Referring Practitioner Heidi Blackman MD   Diagnosis Right knee pain  Right TKA    Treatment Diagnosis R knee pain, R knee stiffness, R knee edema, muscular weakness, difficulty walking   Date of Evaluation 21    Additional Pertinent History December 10, 2021 R TKA, nerve block with immobilizer. R knee meniscus scope many years ago, tried a few cortisone injections. Functional Outcome Measure Used KOOS JR. Functional Outcome Score 22/28 or 78% impaired (21)       Insurance: Primary: Payor: Fabiola Boone /  /  / ,   Secondary: [employer] [place]   Authorization Information: Unlimited visits, aquatic and modalities covered   Visit # 14, 4/10 for progress note   Visits Allowed: Unlimited visits    Recertification Date: 1453   Physician Follow-Up: 2022   Physician Orders:    History of Present Illness:      SUBJECTIVE: Pt reports knee feels better every day and swelling continues to decrease. Pt feels distal quad size is about the same as opposite side.        OBJECTIVE:     TREATMENT   Precautions: R TKA 12/10/21   Pain: 0/10 right knee walking into clinic    X in shaded column indicates activity completed today   Modalities Parameters/  Location  Notes                     Manual Therapy Time/Technique  Notes   Patellar mobs Medial/lateral 10x ea  Good mobility noted               Exercise/Intervention   Notes   Bike- seat 6 5 min  x    Step stretches HS, knee flex, calf 3 15 sec x     Quad set with bolster under ankle 20 5 sec x    SAQ 20x 5 sec     SLR flex  Supine Hip abduction  15  15 5 sec side lying hip abd  15x 3 sec     Heel slide AAROM 20 3 sec x 3-111 deg   Prone knee flex stretch with strap 10 5 sec            Seated knee flexion stretch 15 5     Prone hang   Prone hip extension lift 2 min  15x   3 sec X   2# ankle weight   Total gym 20  x    Standing hip abd B, HR/TR, hamstring curl R, mini squats, march B, hip ext 15  x Progress reps next session   Step-ups 6 inch- forward, lateral RCC 15  x    Nautilus leg press R 2x10 40#     Nautilus leg extension R 2x10 15#              Specific Interventions Next Treatment: NuStep warm up, patellar mobs, leg raises x3 directions, prone knee curl, seated hamstring stretch along plinth, standing gastroc stretch, standing strengthening, gait training, total gym, standing balance. 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 Activity/Treatment Tolerance:  [x]  Patient tolerated treatment well  []  Patient limited by fatigue  []  Patient limited by pain   []  Patient limited by medical complications  []  Other:     Assessment:  Progressed reps with step ups. Pt continues to improve right knee flexion but no change with extension. Pt has soft end feel into extension. 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 Patient will ascend/descend 12 steps with one railing and reciprocal pattern in order to access basement of his house and climb ladders occasionally for work.      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 Patient will improve right knee ROM 0-120 degrees to normalize gait and ease squatting. Patient Education:   []  HEP/Education Completed: negotiate stairs reciprocally  []  No new Education completed  [x]  Reviewed Prior HEP      [x]  Patient verbalized and/or demonstrated understanding of education provided. []  Patient unable to verbalize and/or demonstrate understanding of education provided. Will continue education. []  Barriers to learning:      PLAN:  Treatment Recommendations: Strengthening, Range of Motion, Balance Training, Gait Training, Stair Training, Neuromuscular Re-education, Manual Therapy - Soft Tissue Mobilization, Manual Therapy - Joint Manipulation, Pain Management, Home Exercise Program, Patient Education, Aquatics and Modalities    []  Plan of care initiated. Plan to see patient 3 times per week for 12 weeks to address the treatment planned outlined above.   [x]  Continue with current plan of care  []  Modify plan of care as follows:    []  Hold pending physician visit  []  Discharge    Time In 0858   Time Out 0930   Timed Code Minutes: 32 min   Total Treatment Time:   32 min     Electronically Signed by: Rachell Arnold, PT

## 2022-01-19 ENCOUNTER — HOSPITAL ENCOUNTER (OUTPATIENT)
Dept: PHYSICAL THERAPY | Age: 72
Setting detail: THERAPIES SERIES
Discharge: HOME | End: 2022-01-19
Payer: MEDICARE

## 2022-01-19 PROCEDURE — 97110 THERAPEUTIC EXERCISES: CPT

## 2022-01-19 NOTE — PROGRESS NOTES
[address] [facility]  PHYSICAL THERAPY  [] EVALUATION  [x] DAILY NOTE (LAND) [] DAILY NOTE (AQUATIC ) [] PROGRESS NOTE [] DISCHARGE NOTE    [] [address] [facility]   [x] [address]    [] [facility]   [] Sona Vazquez    Date: 2022  Patient Name:  Gloria George  : 1950  MRN: [id number]  CSN: [id number]    Referring Practitioner Kiara Knox MD   Diagnosis Right knee pain  Right TKA    Treatment Diagnosis R knee pain, R knee stiffness, R knee edema, muscular weakness, difficulty walking   Date of Evaluation 21    Additional Pertinent History December 10, 2021 R TKA, nerve block with immobilizer. R knee meniscus scope many years ago, tried a few cortisone injections. Functional Outcome Measure Used KOOS JR. Functional Outcome Score 22/28 or 78% impaired (21)       Insurance: Primary: Payor: Joseph Ricketts /  /  / ,   Secondary: [employer] [place]   Authorization Information: Unlimited visits, aquatic and modalities covered   Visit # 15, 5/10 for progress note   Visits Allowed: Unlimited visits    Recertification Date: [id number]   Physician Follow-Up: 2022   Physician Orders:    History of Present Illness:      SUBJECTIVE: Pt reports some pain in medial ankle, and hip, intermittently. Pain in R knee 2-3/10 dull, constant, however swelling has gone down. Has follow-up appt with doctor tomorrow.       OBJECTIVE:     TREATMENT   Precautions: R TKA 12/10/21   Pain: 2-3/10 right knee walking into clinic    X in shaded column indicates activity completed today   Modalities Parameters/  Location  Notes                     Manual Therapy Time/Technique  Notes   Patellar mobs Medial/lateral 10x ea x Good mobility noted               Exercise/Intervention   Notes   Bike- seat 6 5 min  x    Step stretches HS, knee flex, calf 3 15 sec x     Quad set with bolster under ankle 20 5 sec x    SAQ 20x 5 sec     SLR flex  Supine Hip abduction  15  15 5 sec       side lying hip abd  15x 3 sec     Heel slide AAROM 20 3 sec x 3-111 deg   Prone knee flex stretch with strap 10 5 sec            Seated knee flexion stretch 15 5     Prone hang   Prone hip extension lift 2 min  15x   3 sec X   2# ankle weight   Total gym 20  x    Standing hip abd B, HR/TR, hamstring curl R, mini squats, march B, hip ext 20  x Progressed reps 1/19   Step-ups 6 inch- forward, lateral RCC 15  x    Nautilus leg press R x20 40# x Increase # next visit   Nautilus leg extension R 2x10 15#              Specific Interventions Next Treatment: NuStep warm up, patellar mobs, leg raises x3 directions, prone knee curl, seated hamstring stretch along plinth, standing gastroc stretch, standing strengthening, gait training, total gym, standing balance. 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 Activity/Treatment Tolerance:  [x]  Patient tolerated treatment well  []  Patient limited by fatigue  []  Patient limited by pain   []  Patient limited by medical complications  []  Other:     Assessment:  Pt tolerated session fairly well, no change with knee AROM today however pt is demonstrating improved strength with exs and ambulation. Verbal cueing for pt to correct hip ER and bend knee more through swing through. Plan to continue with strengthening and range of motion exs as tolerable. 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      Patient will ascend/descend 12 steps with one railing and reciprocal pattern in order to access basement of his house and climb ladders occasionally for work. 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 Patient will improve right knee ROM 0-120 degrees to normalize gait and ease squatting. Patient Education:   []  HEP/Education Completed: negotiate stairs reciprocally  []  No new Education completed  [x]  Reviewed Prior HEP      [x]  Patient verbalized and/or demonstrated understanding of education provided. []  Patient unable to verbalize and/or demonstrate understanding of education provided. Will continue education. []  Barriers to learning:      PLAN:  Treatment Recommendations: Strengthening, Range of Motion, Balance Training, Gait Training, Stair Training, Neuromuscular Re-education, Manual Therapy - Soft Tissue Mobilization, Manual Therapy - Joint Manipulation, Pain Management, Home Exercise Program, Patient Education, Aquatics and Modalities    []  Plan of care initiated. Plan to see patient 3 times per week for 12 weeks to address the treatment planned outlined above.   [x]  Continue with current plan of care  []  Modify plan of care as follows:    []  Hold pending physician visit  []  Discharge    Time In 0930   Time Out 1010   Timed Code Minutes: 40 min   Total Treatment Time:   40 min     Electronically Signed by: Osmel Hutchinson PTA

## 2022-01-21 ENCOUNTER — HOSPITAL ENCOUNTER (OUTPATIENT)
Dept: PHYSICAL THERAPY | Age: 72
Setting detail: THERAPIES SERIES
Discharge: HOME | End: 2022-01-21
Payer: MEDICARE

## 2022-01-21 PROCEDURE — 97110 THERAPEUTIC EXERCISES: CPT

## 2022-01-21 NOTE — PROGRESS NOTES
[address] [facility]  PHYSICAL THERAPY  [x] DAILY NOTE (LAND) [] DAILY NOTE (AQUATIC ) [] PROGRESS NOTE [] DISCHARGE NOTE    [] [address] [facility]   [x] [address]    [] [facility]   [] [place]    Date: 2022  Patient Name:  Lin Lombardi  : 1950  MRN: [id number]  CSN: [id number]    Referring Practitioner Maeve Sue MD   Diagnosis Right knee pain  Right TKA    Treatment Diagnosis R knee pain, R knee stiffness, R knee edema, muscular weakness, difficulty walking   Date of Evaluation 21    Additional Pertinent History December 10, 2021 R TKA, nerve block with immobilizer. R knee meniscus scope many years ago, tried a few cortisone injections. Functional Outcome Measure Used KOOS JR. Functional Outcome Score 22/28 or 78% impaired (21)       Insurance: Primary: Payor: Jermaine Mcdermott /  /  / ,   Secondary: [address]: Unlimited visits, aquatic and modalities covered   Visit # 16, 6/10 for progress note   Visits Allowed: Unlimited visits    Recertification Date: 2214   Physician Follow-Up: 2022   Physician Orders:    History of Present Illness:      SUBJECTIVE: Patient denies having any pain currently. Did see surgeon yesterday and patient reporting \"everything looks good\". Was lacking 5 degrees of knee extension before surgery and does not go back to see surgeon until December and is released to drive. Does still get occasional pain/swelling in knee.       OBJECTIVE:     TREATMENT   Precautions: R TKA 12/10/21   Pain: 0/10 right knee walking into clinic    X in shaded column indicates activity completed today   Modalities Parameters/  Location  Notes                     Manual Therapy Time/Technique  Notes   Patellar mobs Medial/lateral. Up. 1 min ea x Good mobility noted               Exercise/Intervention   Notes   Bike- seat 6 5 min  x    Step stretches HS, knee flex, calf 3 15 sec x     Quad set with bolster under ankle 20 5 sec x    SLR flex  Supine Hip abduction  15  15 5 sec       side lying hip abd  15x 3 sec     Heel slide AAROM 20 3 sec x 3-116 deg   Prone knee flex stretch with strap 10 5 sec            Seated knee flexion stretch 15 5     Prone hang   Prone hip extension lift 2 min  15x   3 sec X   2# ankle weight   Total gym 20  x    Standing hip abd B, HR/TR, hamstring curl R, mini squats, march B, hip ext 20  x Progressed reps 1/19   Step-ups 6 inch- forward, lateral RCC 15  x    Nautilus leg press R 10 50# x    Nautilus leg extension R 2x10 15# x             Specific Interventions Next Treatment: NuStep warm up, patellar mobs, leg raises x3 directions, prone knee curl, seated hamstring stretch along plinth, standing gastroc stretch, standing strengthening, gait training, total gym, standing balance. 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 Activity/Treatment Tolerance:  [x]  Patient tolerated treatment well  []  Patient limited by fatigue  []  Patient limited by pain   []  Patient limited by medical complications  []  Other:     Assessment:  Progressed weight of leg press with good tolerance. Did also show improvement in knee flexion range of motion today. Patient denies increased pain at end of session. 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      Patient will ascend/descend 12 steps with one railing and reciprocal pattern in order to access basement of his house and climb ladders occasionally for work. 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 Patient will improve right knee ROM 0-120 degrees to normalize gait and ease squatting. Patient Education:   []  HEP/Education Completed: prone hang at home for knee extension  []  No new Education completed  [x]  Reviewed Prior HEP      [x]  Patient verbalized and/or demonstrated understanding of education provided. []  Patient unable to verbalize and/or demonstrate understanding of education provided. Will continue education. []  Barriers to learning:      PLAN:  Treatment Recommendations: Strengthening, Range of Motion, Balance Training, Gait Training, Stair Training, Neuromuscular Re-education, Manual Therapy - Soft Tissue Mobilization, Manual Therapy - Joint Manipulation, Pain Management, Home Exercise Program, Patient Education, Aquatics and Modalities    []  Plan of care initiated. Plan to see patient 3 times per week for 12 weeks to address the treatment planned outlined above.   [x]  Continue with current plan of care  []  Modify plan of care as follows:    []  Hold pending physician visit  []  Discharge    Time In 0932   Time Out 1011   Timed Code Minutes: 39 min   Total Treatment Time:   39 min     Electronically Signed by: Bola Metcalf PTA

## 2022-01-24 ENCOUNTER — HOSPITAL ENCOUNTER (OUTPATIENT)
Dept: PHYSICAL THERAPY | Age: 72
Setting detail: THERAPIES SERIES
Discharge: HOME | End: 2022-01-24
Payer: MEDICARE

## 2022-01-24 PROCEDURE — 97110 THERAPEUTIC EXERCISES: CPT

## 2022-01-24 NOTE — PROGRESS NOTES
[facility]  PHYSICAL THERAPY  [x] DAILY NOTE (LAND) [] DAILY NOTE (AQUATIC ) [] PROGRESS NOTE [] DISCHARGE NOTE    [] [address] [facility]   [x] [address]    [] [facility]   [] [place]    Date: 2022  Patient Name:  Kashmir Hylton  : 1950  MRN: [id number]  CSN: [id number]    Referring Practitioner Phu Gunn MD   Diagnosis Right knee pain  Right TKA    Treatment Diagnosis R knee pain, R knee stiffness, R knee edema, muscular weakness, difficulty walking   Date of Evaluation 21    Additional Pertinent History December 10, 2021 R TKA, nerve block with immobilizer. R knee meniscus scope many years ago, tried a few cortisone injections. Functional Outcome Measure Used KOOS JR. Functional Outcome Score 22/28 or 78% impaired (21)       Insurance: Primary: Payor: Lindsay Mccallum /  /  / ,   Secondary: [employer] [place]   Authorization Information: Unlimited visits, aquatic and modalities covered   Visit # 17, 7/10 for progress note   Visits Allowed: Unlimited visits    Recertification Date: 1104   Physician Follow-Up: 2022   Physician Orders:    History of Present Illness:      SUBJECTIVE: Patient reports swelling continues to decrease and that makes the knee feel better.      OBJECTIVE:     TREATMENT   Precautions: R TKA 12/10/21   Pain: 0/10 right knee walking into clinic    X in shaded column indicates activity completed today   Modalities Parameters/  Location  Notes                     Manual Therapy Time/Technique  Notes   Patellar mobs Medial/lateral. Up. 1 min ea  Good mobility noted               Exercise/Intervention   Notes   Bike- seat 6 5 min  x    Step stretches HS, knee flex, calf 3 15 sec x     Quad set with bolster under ankle 20 5 sec x    SLR flex  Supine Hip abduction  15  15 5 sec       side lying hip abd  15x 3 sec     Heel slide AAROM 20 3 sec x 3-117 deg   Prone knee flex stretch with strap 10 5 sec            Seated knee flexion stretch 15 5     Prone hang   Prone hip extension lift 2 min  15x   3 sec X   2# ankle weight   Total gym 25  x Progressed reps 1/24   Standing hip abd B, HR/TR, hamstring curl R, mini squats, march B, hip ext 20  x Progressed reps 1/19   Step-ups 6 inch- forward, lateral RCC 20  x Progressed reps 1/24   Nautilus leg press R 2x10 50# x    Nautilus leg extension R 2x10 15# x    Nautilus hamstring curl R 10  10 40#  50# X  X      Specific Interventions Next Treatment: NuStep warm up, patellar mobs, leg raises x3 directions, prone knee curl, seated hamstring stretch along plinth, standing gastroc stretch, standing strengthening, gait training, total gym, standing balance. 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 Activity/Treatment Tolerance:  [x]  Patient tolerated treatment well  []  Patient limited by fatigue  []  Patient limited by pain   []  Patient limited by medical complications  []  Other:     Assessment:  Progressed reps with TG and step ups and added Nautilus hamstring curl with good tolerance. Right knee flexion improved 1 degree. 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 Patient will ascend/descend 12 steps with one railing and reciprocal pattern in order to access basement of his house and climb ladders occasionally for work.      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 Patient will improve right knee ROM 0-120 degrees to normalize gait and ease squatting. Patient Education:   []  HEP/Education Completed: prone hang at home for knee extension  []  No new Education completed  [x]  Reviewed Prior HEP      [x]  Patient verbalized and/or demonstrated understanding of education provided. []  Patient unable to verbalize and/or demonstrate understanding of education provided. Will continue education. []  Barriers to learning:      PLAN:  Treatment Recommendations: Strengthening, Range of Motion, Balance Training, Gait Training, Stair Training, Neuromuscular Re-education, Manual Therapy - Soft Tissue Mobilization, Manual Therapy - Joint Manipulation, Pain Management, Home Exercise Program, Patient Education, Aquatics and Modalities    []  Plan of care initiated. Plan to see patient 3 times per week for 12 weeks to address the treatment planned outlined above.   [x]  Continue with current plan of care  []  Modify plan of care as follows:    []  Hold pending physician visit  []  Discharge    Time In 0943   Time Out 1021   Timed Code Minutes: 38 min   Total Treatment Time:   38 min     Electronically Signed by: Yasmine Mariano, PT

## 2022-01-26 ENCOUNTER — HOSPITAL ENCOUNTER (OUTPATIENT)
Dept: PHYSICAL THERAPY | Age: 72
Setting detail: THERAPIES SERIES
Discharge: HOME | End: 2022-01-26
Payer: MEDICARE

## 2022-01-26 PROCEDURE — 97110 THERAPEUTIC EXERCISES: CPT

## 2022-01-26 NOTE — PROGRESS NOTES
[address] [facility]  PHYSICAL THERAPY  [x] DAILY NOTE (LAND) [] DAILY NOTE (AQUATIC ) [] PROGRESS NOTE [] DISCHARGE NOTE    [] 615 [facility]   [x] [place] A     [] [facility]   [] Julius Snow    Date: 2022  Patient Name:  Elizabeth Rosas  : 1950  MRN: [id number]  CSN: [id number]    Referring Practitioner Carlos Henao MD   Diagnosis Right knee pain  Right TKA    Treatment Diagnosis R knee pain, R knee stiffness, R knee edema, muscular weakness, difficulty walking   Date of Evaluation 21    Additional Pertinent History December 10, 2021 R TKA, nerve block with immobilizer. R knee meniscus scope many years ago, tried a few cortisone injections. Functional Outcome Measure Used KOOS JR. Functional Outcome Score 22/28 or 78% impaired (21)       Insurance: Primary: Payor: Alejandrina Perez /  /  / ,   Secondary: [place] [employer] [place]   Authorization Information: Unlimited visits, aquatic and modalities covered   Visit # 18, 8/10 for progress note   Visits Allowed: Unlimited visits    Recertification Date:    Physician Follow-Up: 2022   Physician Orders:    History of Present Illness:      SUBJECTIVE: Patient states only has pain \"when trying to do something he shouldn't\". Pt asking if he will ever be able to kneel on R knee, PTA gave suggestions on safe positions. Pt only able to stay for 30 min today d/t wife's appt. Pt states compliant with HEP x1 every other day.     OBJECTIVE:     TREATMENT   Precautions: R TKA 12/10/21   Pain: 0/10 right knee walking into clinic    X in shaded column indicates activity completed today   Modalities Parameters/  Location  Notes                     Manual Therapy Time/Technique  Notes   Patellar mobs Medial/lateral. Up. 1 min ea  Good mobility noted               Exercise/Intervention   Notes   Bike- seat 6 5 min  x    Step stretches HS, knee flex, calf 3 15 sec x     Quad set with bolster under ankle 20 5 sec     SLR flex  Supine Hip abduction  15  15 5 sec       side lying hip abd  15x 3 sec     Heel slide AAROM 20 3 sec x 3-115 deg   Prone knee flex stretch with strap 10 5 sec            Seated knee flexion stretch 15 5     Prone hang   Prone hip extension lift 2 min  15x   3 sec    2# ankle weight   Total gym squats  SL squat R 25  10  X  x Progressed reps 1/24   Standing hip abd B, HR/TR, hamstring curl R, mini squats, march B, hip ext 20  x VCs for one UE for support and equal weight with squats   Step-ups 6 inch- forward, lateral RCC 20   Progressed reps 1/24   Nautilus leg press R 2x10 50# x    Nautilus leg extension R 2x10 15# x    Nautilus hamstring curl R 2x10 50# X    Mini forward lunges         Specific Interventions Next Treatment: NuStep warm up, patellar mobs, leg raises x3 directions, prone knee curl, seated hamstring stretch along plinth, standing gastroc stretch, standing strengthening, gait training, total gym, standing balance. 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 Activity/Treatment Tolerance:  [x]  Patient tolerated treatment well  []  Patient limited by fatigue  []  Patient limited by pain   []  Patient limited by medical complications  []  Other:     Assessment:  Pt tolerated session well, continues to be lacking 15 degrees knee flexion compared to L. Discussed importance of HEP to improve flexibility and functional mobility. Plan to continue with POC next session, progressing as tolerable. 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 Patient will ascend/descend 12 steps with one railing and reciprocal pattern in order to access basement of his house and climb ladders occasionally for work. 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 Patient will improve right knee ROM 0-120 degrees to normalize gait and ease squatting. Patient Education:   []  HEP/Education Completed: prone hang at home for knee extension  []  No new Education completed  [x]  Reviewed Prior HEP      [x]  Patient verbalized and/or demonstrated understanding of education provided. []  Patient unable to verbalize and/or demonstrate understanding of education provided. Will continue education. []  Barriers to learning:      PLAN:  Treatment Recommendations: Strengthening, Range of Motion, Balance Training, Gait Training, Stair Training, Neuromuscular Re-education, Manual Therapy - Soft Tissue Mobilization, Manual Therapy - Joint Manipulation, Pain Management, Home Exercise Program, Patient Education, Aquatics and Modalities    []  Plan of care initiated. Plan to see patient 3 times per week for 12 weeks to address the treatment planned outlined above.   [x]  Continue with current plan of care  []  Modify plan of care as follows:    []  Hold pending physician visit  []  Discharge    Time In 0900   Time Out 0930   Timed Code Minutes: 30 min   Total Treatment Time:   30 min     Electronically Signed by: Luis Lloyd PTA

## 2022-01-28 ENCOUNTER — HOSPITAL ENCOUNTER (OUTPATIENT)
Dept: PHYSICAL THERAPY | Age: 72
Setting detail: THERAPIES SERIES
Discharge: HOME | End: 2022-01-28
Payer: MEDICARE

## 2022-01-28 PROCEDURE — 97110 THERAPEUTIC EXERCISES: CPT

## 2022-01-28 NOTE — PROGRESS NOTES
[address] [facility]  PHYSICAL THERAPY  [x] DAILY NOTE (LAND) [] DAILY NOTE (AQUATIC ) [] PROGRESS NOTE [] DISCHARGE NOTE    [] [facility]   [x] Johnkwame Aguirre    [] [facility]   [] Deya Prabhakar    Date: 2022  Patient Name:  Graciela Matthews  : 1950  MRN: [id number]  CSN: [id number]    Referring Practitioner Azar Garay MD   Diagnosis Right knee pain  Right TKA    Treatment Diagnosis R knee pain, R knee stiffness, R knee edema, muscular weakness, difficulty walking   Date of Evaluation 21    Additional Pertinent History December 10, 2021 R TKA, nerve block with immobilizer. R knee meniscus scope many years ago, tried a few cortisone injections. Functional Outcome Measure Used KOOS JR. Functional Outcome Score 28 or 78% impaired (21)       Insurance: Primary: Payor: Kenzie Fong /  /  / ,   Secondary: [facility]   Authorization Information: Unlimited visits, aquatic and modalities covered   Visit # 19, 9/10 for progress note   Visits Allowed: Unlimited visits    Recertification Date: [id number]   Physician Follow-Up: 2022   Physician Orders:    History of Present Illness:      SUBJECTIVE: Patient reports knee feels about the same. Pt worked on negotiating stairs yesterday, and able to do slowly without handrail.      OBJECTIVE:     TREATMENT   Precautions: R TKA 12/10/21   Pain: 0/10 right knee walking into clinic    X in shaded column indicates activity completed today   Modalities Parameters/  Location  Notes                     Manual Therapy Time/Technique  Notes   Patellar mobs Medial/lateral. Up. 1 min ea  Good mobility noted               Exercise/Intervention   Notes   Bike- seat 6 5 min  x    Step stretches HS, knee flex, calf 3 15 sec x     Quad set with bolster under ankle 20 5 sec x    SLR flex  Supine Hip abduction  20  15 5 sec x      side lying hip abd  15x 3 sec Heel slide AAROM 20 3 sec x 3-116 deg   Prone knee flex stretch with strap 5 10 sec x           Seated knee flexion stretch 15 5     Prone hang   Prone hip extension lift 2 min  15x   3 sec x   2# ankle weight   Total gym squats  SL squat R 25  10  X  x Progressed reps 1/24   Standing ON FOAM:  hip abd B, HR/TR, hamstring curl R, mini squats, march B, hip ext 20  x VCs for one UE for support and equal weight with squats   Step-ups 6 inch- forward, lateral RCC 20  x    Nautilus leg press R 2x10 50# x    Nautilus leg extension R 2x10 15# x    Nautilus hamstring curl R 2x10 50# X    Mini forward lunges- B 10  x      Specific Interventions Next Treatment: NuStep warm up, patellar mobs, leg raises x3 directions, prone knee curl, seated hamstring stretch along plinth, standing gastroc stretch, standing strengthening, gait training, total gym, standing balance. 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 Activity/Treatment Tolerance:  [x]  Patient tolerated treatment well  []  Patient limited by fatigue  []  Patient limited by pain   []  Patient limited by medical complications  []  Other:     Assessment:  Added foam to standing exercises and introduced forward lunges with good technique. 1 degree improvement in knee flexion. Reassessment next visit with plan to continue a couple more weeks, but decrease to 2x/wk, to continue strengthening and get 120 degrees flexion. 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 Patient will ascend/descend 12 steps with one railing and reciprocal pattern in order to access basement of his house and climb ladders occasionally for work. 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 Patient will improve right knee ROM 0-120 degrees to normalize gait and ease squatting. Patient Education:   []  HEP/Education Completed: prone hang at home for knee extension  []  No new Education completed  [x]  Reviewed Prior HEP      [x]  Patient verbalized and/or demonstrated understanding of education provided. []  Patient unable to verbalize and/or demonstrate understanding of education provided. Will continue education. []  Barriers to learning:      PLAN:  Treatment Recommendations: Strengthening, Range of Motion, Balance Training, Gait Training, Stair Training, Neuromuscular Re-education, Manual Therapy - Soft Tissue Mobilization, Manual Therapy - Joint Manipulation, Pain Management, Home Exercise Program, Patient Education, Aquatics and Modalities    []  Plan of care initiated. Plan to see patient 3 times per week for 12 weeks to address the treatment planned outlined above.   [x]  Continue with current plan of care  []  Modify plan of care as follows:    []  Hold pending physician visit  []  Discharge    Time In 0913   Time Out 0955   Timed Code Minutes: 42 min   Total Treatment Time:   42 min     Electronically Signed by: Ching Wong PT

## 2022-01-31 ENCOUNTER — HOSPITAL ENCOUNTER (OUTPATIENT)
Dept: PHYSICAL THERAPY | Age: 72
Setting detail: THERAPIES SERIES
Discharge: HOME | End: 2022-01-31
Payer: MEDICARE

## 2022-01-31 PROCEDURE — 97110 THERAPEUTIC EXERCISES: CPT

## 2022-01-31 NOTE — PROGRESS NOTES
[address] [facility]  PHYSICAL THERAPY  [] DAILY NOTE (LAND) [] DAILY NOTE (AQUATIC ) [x] PROGRESS NOTE [] DISCHARGE NOTE    [] [facility]   [x] [address]    [] [facility]   [] Chow Pac    Date: 2022  Patient Name:  Colton Thurman  : 1950  MRN: [id number]  CSN: [id number]    Referring Practitioner Kerry Montana MD   Diagnosis Right knee pain  Right TKA    Treatment Diagnosis R knee pain, R knee stiffness, R knee edema, muscular weakness, difficulty walking   Date of Evaluation 21    Additional Pertinent History December 10, 2021 R TKA, nerve block with immobilizer. R knee meniscus scope many years ago, tried a few cortisone injections. Functional Outcome Measure Used KOOS JR. Functional Outcome Score  or 78% impaired (21)  (22)      Insurance: Primary: Payor: Sarwat Meade /  /  / ,   Secondary: [employer] [place]   Authorization Information: Unlimited visits, aquatic and modalities covered   Visit # 20, 0/10 for progress note   Visits Allowed: Unlimited visits    Recertification Date: 1/3/2223   Physician Follow-Up: 2022   Physician Orders:    History of Present Illness:      SUBJECTIVE: Patient reports he did some thigh stretches over the weekend which caused soreness. Pt also notes he was sore after last session.        OBJECTIVE:     TREATMENT   Precautions: R TKA 12/10/21   Pain: 0/10 right knee walking into clinic    X in shaded column indicates activity completed today   Modalities Parameters/  Location  Notes                     Manual Therapy Time/Technique  Notes   Patellar mobs Medial/lateral. Up. 1 min ea  Good mobility noted               Exercise/Intervention   Notes   Bike- seat 6 5 min  x    Step stretches HS, knee flex, calf 3 15 sec x     Quad set with bolster under ankle 20 5 sec x    SLR flex  Supine Hip abduction  20  15 5 sec       side lying hip abd  15x 3 sec     Heel slide AAROM 20 3 sec x 3-118 deg   Prone knee flex stretch with strap 5 10 sec     SL hip ABD 10 5 sec x Cues to keep foot neutral   Seated knee flexion stretch 15 5     Prone hang   Prone hip extension lift 2 min  15x   3 sec x   2# ankle weight   Total gym squats  SL squat R 25  10  X  x    Standing ON FOAM:  hip abd B, HR/TR, hamstring curl R, mini squats, march B, hip ext 15  x VCs for one UE for support and equal weight with squats   Step-ups 6 inch- forward, lateral RCC 15/20  x    Nautilus leg press R 2x10 50# x    Nautilus leg extension R 3x10 15# x    Nautilus hamstring curl R 2x10 50# X    Mini forward lunges- B 10  x      Specific Interventions Next Treatment: ROM, hip strength    Activity/Treatment Tolerance:  [x]  Patient tolerated treatment well  []  Patient limited by fatigue  []  Patient limited by pain   []  Patient limited by medical complications  []  Other:     Assessment:  Pt continues to progress toward goals. Right knee is strong but hip flexion and ABD remain weak. Pt lacking a few degrees each way with ROM. Pt able to negotiate stairs reciprocally with less reliance on railing. KOOS Jr score decreased 17% indicating less limitations. Pt will benefit from additional PT to work toward meeting long term goals. Plan to decrease frequency to 2x/wk. 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 GOAL NOT MET: right hip flexion 4/5, ABD 3+/5, extension 5-/5, hamstring 5/5, quad 5-/5 with good tolerance to squatting and stairs.   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 GOAL NOT MET: KOOS Jr score 61%. Continue Goal    Patient will ascend/descend 12 steps with one railing and reciprocal pattern in order to access basement of his house and climb ladders occasionally for work. GOAL NOT MET: Pt negotiating stairs reciprocally, requiring 1 handrail initially and then no handrail to/from basement, and has done a couple ladder rungs in past week in shop. 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 GOAL MET:  Pt able to carry 50# bag of cat foot on level ground with minimal difficulty. Discontinue Goal    Patient will improve right knee ROM 0-120 degrees to normalize gait and ease squatting. GOAL NOT MET: right knee extension 3 deg, flexion A/A 118 deg. Continue Goal         Patient Education:   [x]  HEP/Education Completed: SL hip ABD  []  No new Education completed  [x]  Reviewed Prior HEP      [x]  Patient verbalized and/or demonstrated understanding of education provided. []  Patient unable to verbalize and/or demonstrate understanding of education provided. Will continue education. []  Barriers to learning:      PLAN:  Treatment Recommendations: Strengthening, Range of Motion, Balance Training, Gait Training, Stair Training, Neuromuscular Re-education, Manual Therapy - Soft Tissue Mobilization, Manual Therapy - Joint Manipulation, Pain Management, Home Exercise Program, Patient Education, Aquatics and Modalities    []  Plan of care initiated. Plan to see patient 3 times per week for 12 weeks to address the treatment planned outlined above.   []  Continue with current plan of care  [x]  Modify plan of care as follows:  Decrease to 2x/wk  []  Hold pending physician visit  []  Discharge    Time In 0900   Time Out 0945   Timed Code Minutes: 45 min   Total Treatment Time:   45 min Electronically Signed by: Raquel Valenzuela, PT

## 2022-02-04 ENCOUNTER — APPOINTMENT (OUTPATIENT)
Dept: PHYSICAL THERAPY | Age: 72
End: 2022-02-04
Payer: MEDICARE

## 2022-02-09 ENCOUNTER — HOSPITAL ENCOUNTER (OUTPATIENT)
Dept: PHYSICAL THERAPY | Age: 72
Setting detail: THERAPIES SERIES
Discharge: HOME | End: 2022-02-09
Payer: MEDICARE

## 2022-02-09 PROCEDURE — 97110 THERAPEUTIC EXERCISES: CPT

## 2022-02-09 NOTE — PROGRESS NOTES
[address] [facility]  PHYSICAL THERAPY  [x] DAILY NOTE (LAND) [] DAILY NOTE (AQUATIC ) [] PROGRESS NOTE [] DISCHARGE NOTE    [] [address] [facility]   [x] [address]    [] [facility]   [] Junie Burns    Date: 2022  Patient Name:  Verenice Betancur  : 1950  MRN: [id number]  CSN: [id number]    Referring Practitioner Isabella Castaneda MD   Diagnosis Right knee pain  Right TKA    Treatment Diagnosis R knee pain, R knee stiffness, R knee edema, muscular weakness, difficulty walking   Date of Evaluation 21    Additional Pertinent History December 10, 2021 R TKA, nerve block with immobilizer. R knee meniscus scope many years ago, tried a few cortisone injections. Functional Outcome Measure Used KOOS JR. Functional Outcome Score  or 78% impaired (21)  (22)      Insurance: Primary: Payor: Jessu Segal /  /  / ,   Secondary: [facility]   Authorization Information: Unlimited visits, aquatic and modalities covered   Visit # 20, 0/10 for progress note   Visits Allowed: Unlimited visits    Recertification Date: [id number]   Physician Follow-Up: 2022   Physician Orders:    History of Present Illness:      SUBJECTIVE: Patient reports continues to be sore if on his feet for long periods of time,  Has swelling intermittently, gets sore after HEP.       OBJECTIVE:     TREATMENT   Precautions: R TKA 12/10/21   Pain: 0/10 right knee walking into clinic    X in shaded column indicates activity completed today   Modalities Parameters/  Location  Notes                     Manual Therapy Time/Technique  Notes   Patellar mobs Medial/lateral. Up. 1 min ea  Good mobility noted               Exercise/Intervention   Notes   Bike- seat 6 5 min  x    Step stretches HS, knee flex, calf 3 15 sec x     Quad set with bolster under ankle 20 5 sec x    SLR flex  Supine Hip abduction  20  15 5 sec       side lying hip abd 15x 3 sec     Heel slide AAROM 20 3 sec x 3-118 deg   Prone knee flex stretch with strap 5 10 sec     SL hip ABD 10 5 sec x Cues to keep foot neutral   Seated knee flexion stretch 15 5     Prone hang   Prone hip extension lift 2 min  15x   3 sec x   2# ankle weight   Total gym squats  SL squat R 25  15    x    Standing ON FOAM:  hip abd B, HR/TR, hamstring curl R, mini squats, march B, hip ext 15  x VCs for one UE for support and equal weight with squats   Step-ups 6 inch- forward, lateral RCC 20  x    Nautilus leg press R 2x10 50# x    Nautilus leg extension R 3x10 15# x    Nautilus hamstring curl R 2x10 50# X    Mini forward lunges- B 10  x      Specific Interventions Next Treatment: ROM, hip strength    Activity/Treatment Tolerance:  [x]  Patient tolerated treatment well  []  Patient limited by fatigue  []  Patient limited by pain   []  Patient limited by medical complications  []  Other:     Assessment:  Pt tolerated session well, focusing on correct form with exs and correct mm engagement. Verbal cues required during standing on foam exs for upright posture and correct technique during exs. No change in AROM today, noted visible swelling at end of session. Discussed ice use after exs/HEP. 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 Patient will ascend/descend 12 steps with one railing and reciprocal pattern in order to access basement of his house and climb ladders occasionally for work.      Patient will improve right knee ROM 0-120 degrees to normalize gait and ease squatting. Patient Education:   []  HEP/Education Completed: SL hip ABD  []  No new Education completed  [x]  Reviewed Prior HEP      [x]  Patient verbalized and/or demonstrated understanding of education provided. []  Patient unable to verbalize and/or demonstrate understanding of education provided. Will continue education. []  Barriers to learning:      PLAN:  Treatment Recommendations: Strengthening, Range of Motion, Balance Training, Gait Training, Stair Training, Neuromuscular Re-education, Manual Therapy - Soft Tissue Mobilization, Manual Therapy - Joint Manipulation, Pain Management, Home Exercise Program, Patient Education, Aquatics and Modalities    []  Plan of care initiated. Plan to see patient 3 times per week for 12 weeks to address the treatment planned outlined above.   []  Continue with current plan of care  [x]  Modify plan of care as follows:  Decrease to 2x/wk  []  Hold pending physician visit  []  Discharge    Time In 0826   Time Out 0909   Timed Code Minutes: 43 min   Total Treatment Time:    43 min     Electronically Signed by: Lanny Puga PTA

## 2022-02-11 ENCOUNTER — HOSPITAL ENCOUNTER (OUTPATIENT)
Dept: PHYSICAL THERAPY | Age: 72
Setting detail: THERAPIES SERIES
Discharge: HOME | End: 2022-02-11
Payer: MEDICARE

## 2022-02-11 PROCEDURE — 97110 THERAPEUTIC EXERCISES: CPT

## 2022-02-11 NOTE — PROGRESS NOTES
[address] [facility]  PHYSICAL THERAPY  [x] DAILY NOTE (LAND) [] DAILY NOTE (AQUATIC ) [] PROGRESS NOTE [] DISCHARGE NOTE    [] [address] [facility]   [x] [address]    [] [facility]   [] Sona Vazquez    Date: 2022  Patient Name:  Gloria George  : 1950  MRN: [id number]  CSN: [id number]    Referring Practitioner Kiara Knox MD   Diagnosis Right knee pain  Right TKA    Treatment Diagnosis R knee pain, R knee stiffness, R knee edema, muscular weakness, difficulty walking   Date of Evaluation 21    Additional Pertinent History December 10, 2021 R TKA, nerve block with immobilizer. R knee meniscus scope many years ago, tried a few cortisone injections. Functional Outcome Measure Used KOOS JR. 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 Currently denies pain. Patient does not use ice anymore but does report that knee does feel warm to the touch.       OBJECTIVE:     TREATMENT   Precautions: R TKA 12/10/21   Pain: 0/10 right knee walking into clinic    X in shaded column indicates activity completed today   Modalities Parameters/  Location  Notes                     Manual Therapy Time/Technique  Notes   Patellar mobs Medial/lateral. Up. 1 min ea  Good mobility noted               Exercise/Intervention   Notes   Bike- seat 6 5 min  x    Step stretches HS, knee flex, calf 3 15 sec x     Quad set with bolster under ankle 20 5 sec x    SLR flex  Supine Hip abduction  20  15 5 sec       side lying hip abd  15x 3 sec     Heel slide AAROM 20 3 sec x 3-120 deg   SL hip ABD 15 5 sec x Cues to keep foot neutral   Prone hang   Prone hip extension lift 2 min  15x   3 sec x   2# ankle weight   Total gym squats  SL squat R 25  15        Standing ON FOAM:  hip abd B, HR/TR, hamstring curl R, mini squats, march B, hip ext 15  x VCs for one UE for support and equal weight with squats   Step-ups 6 inch- forward, lateral RCC 20  x    Nautilus leg press R 2x10 50# x    Nautilus leg extension R 3x10 15# x    Nautilus hamstring curl R 3x10 50# X    Mini forward lunges- B 10  x      Specific Interventions Next Treatment: ROM, hip strength    Activity/Treatment Tolerance:  [x]  Patient tolerated treatment well  []  Patient limited by fatigue  []  Patient limited by pain   []  Patient limited by medical complications  []  Other:     Assessment:  Able to get 120 degrees knee flexion active assistive. Did also add reps to hamstring curls with good tolerance. 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 Patient will ascend/descend 12 steps with one railing and reciprocal pattern in order to access basement of his house and climb ladders occasionally for work. Patient will improve right knee ROM 0-120 degrees to normalize gait and ease squatting.        Patient Education:   []  HEP/Education Completed: SL hip ABD  []  No new Education completed  [x] Reviewed Prior HEP      [x]  Patient verbalized and/or demonstrated understanding of education provided. []  Patient unable to verbalize and/or demonstrate understanding of education provided. Will continue education. []  Barriers to learning:      PLAN:  Treatment Recommendations: Strengthening, Range of Motion, Balance Training, Gait Training, Stair Training, Neuromuscular Re-education, Manual Therapy - Soft Tissue Mobilization, Manual Therapy - Joint Manipulation, Pain Management, Home Exercise Program, Patient Education, Aquatics and Modalities    []  Plan of care initiated. Plan to see patient 3 times per week for 12 weeks to address the treatment planned outlined above.   []  Continue with current plan of care  [x]  Modify plan of care as follows:  Decrease to 2x/wk  []  Hold pending physician visit  []  Discharge    Time In 5299   Time Out 0924   Timed Code Minutes: 42 min   Total Treatment Time:    42 min     Electronically Signed by: Jay Cross PTA

## 2022-02-16 ENCOUNTER — HOSPITAL ENCOUNTER (OUTPATIENT)
Dept: PHYSICAL THERAPY | Age: 72
Setting detail: THERAPIES SERIES
Discharge: HOME | End: 2022-02-16
Payer: MEDICARE

## 2022-02-16 PROCEDURE — 97110 THERAPEUTIC EXERCISES: CPT

## 2022-02-16 NOTE — PROGRESS NOTES
[facility]  PHYSICAL THERAPY  [x] DAILY NOTE (LAND) [] DAILY NOTE (AQUATIC ) [] PROGRESS NOTE [] DISCHARGE NOTE    [] [facility]   [x] Carlos A 90    [] [facility]   [] Ania Arnold    Date: 2022  Patient Name:  Candy Guardado  : 1950  MRN: [id number]  CSN: [id number]    Referring Practitioner Eleno Dupree MD   Diagnosis Right knee pain  Right TKA    Treatment Diagnosis R knee pain, R knee stiffness, R knee edema, muscular weakness, difficulty walking   Date of Evaluation 21    Additional Pertinent History December 10, 2021 R TKA, nerve block with immobilizer. R knee meniscus scope many years ago, tried a few cortisone injections. Functional Outcome Measure Used KOOS JR. 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 5/10 pain then. Today pain 0-3/10.       OBJECTIVE:     TREATMENT   Precautions: R TKA 12/10/21   Pain: 0/10 right knee walking into clinic    X in shaded column indicates activity completed today   Modalities Parameters/  Location  Notes                     Manual Therapy Time/Technique  Notes   Patellar mobs Medial/lateral. Up. 1 min ea  Good mobility noted               Exercise/Intervention   Notes   Bike- seat 6 5 min  x    Step stretches HS, knee flex, calf 3 15 sec x     Quad set with bolster under ankle 20 5 sec x    SLR flex  Supine Hip abduction  20  15 5 sec       side lying hip abd  15x 3 sec     Heel slide AAROM 20 3 sec x 3-120 deg   SL hip ABD 15 5 sec x Cues to keep foot neutral   Prone hang   Prone hip extension lift 2 min  15x   3 sec x   3# ankle weight   Total gym squats  SL squat R 25  15        Standing ON FOAM:  hip abd B, HR/TR, hamstring curl R, mini squats, march B, hip ext 15  x VCs for one UE for support and equal weight with squats   Step-ups 6 inch- forward, lateral RCC 20  x    Nautilus leg press R 2x10 50# x    Nautilus leg extension R 3x10 15# x    Nautilus hamstring curl R 3x10 50# X    Mini forward lunges- B 10  x      Specific Interventions Next Treatment: ROM, hip strength    Activity/Treatment Tolerance:  [x]  Patient tolerated treatment well  []  Patient limited by fatigue  []  Patient limited by pain   []  Patient limited by medical complications  []  Other:     Assessment:  Pt tolerated session well, no change in ROM this date. Continued with POC, did not progress today d/t progressing exs last session. 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 Patient will ascend/descend 12 steps with one railing and reciprocal pattern in order to access basement of his house and climb ladders occasionally for work. Patient will improve right knee ROM 0-120 degrees to normalize gait and ease squatting.        Patient Education:   []  HEP/Education Completed: SL hip ABD  []  No new Education completed  [x]  Reviewed Prior HEP      [x] Patient verbalized and/or demonstrated understanding of education provided. []  Patient unable to verbalize and/or demonstrate understanding of education provided. Will continue education. []  Barriers to learning:      PLAN:  Treatment Recommendations: Strengthening, Range of Motion, Balance Training, Gait Training, Stair Training, Neuromuscular Re-education, Manual Therapy - Soft Tissue Mobilization, Manual Therapy - Joint Manipulation, Pain Management, Home Exercise Program, Patient Education, Aquatics and Modalities    []  Plan of care initiated. Plan to see patient 3 times per week for 12 weeks to address the treatment planned outlined above.   []  Continue with current plan of care  [x]  Modify plan of care as follows:  Decrease to 2x/wk  []  Hold pending physician visit  []  Discharge    Time In 0830   Time Out 0914   Timed Code Minutes: 44 min   Total Treatment Time:    44 min     Electronically Signed by: Ok Aldridge PTA

## 2022-02-18 ENCOUNTER — HOSPITAL ENCOUNTER (OUTPATIENT)
Dept: PHYSICAL THERAPY | Age: 72
Setting detail: THERAPIES SERIES
Discharge: HOME | End: 2022-02-18
Payer: MEDICARE

## 2022-02-18 PROCEDURE — 97110 THERAPEUTIC EXERCISES: CPT

## 2022-02-18 NOTE — DISCHARGE SUMMARY
[address] [facility]  PHYSICAL THERAPY  [] DAILY NOTE (LAND) [] DAILY NOTE (AQUATIC ) [] PROGRESS NOTE [x] DISCHARGE NOTE    [] [facility]   [x] Carlos A 90    [] [facility]   [] Gamal iFelds    Date: 2022  Patient Name:  Mariana DelR osario  : 1950  MRN: [id number]  CSN: [id number]    Referring Practitioner Savanah Oates MD   Diagnosis Right knee pain  Right TKA    Treatment Diagnosis R knee pain, R knee stiffness, R knee edema, muscular weakness, difficulty walking   Date of Evaluation 21    Additional Pertinent History December 10, 2021 R TKA, nerve block with immobilizer. R knee meniscus scope many years ago, tried a few cortisone injections. Functional Outcome Measure Used KOOS JR. Functional Outcome Score / or 78% impaired (21) 17/28 (22) 16% impaired (22)      Insurance: Primary: Payor: Gregg Siegel /  /  / ,   Secondary: [employer] [place]   Authorization Information: Unlimited visits, aquatic and modalities covered   Visit # 24, 4/10 for progress note   Visits Allowed: Unlimited visits    Recertification Date: [id number]   Physician Follow-Up: 2022   Physician Orders:    History of Present Illness:      SUBJECTIVE: Patient reports he had some pain yesterday. Pt purchased month membership at [employer] yesterday. Pt feels comfortable with his progress.      OBJECTIVE:     TREATMENT   Precautions: R TKA 12/10/21   Pain: 0/10 right knee walking into clinic    X in shaded column indicates activity completed today   Modalities Parameters/  Location  Notes                     Manual Therapy Time/Technique  Notes   Patellar mobs Medial/lateral. Up. 1 min ea  Good mobility noted               Exercise/Intervention   Notes   Bike- seat 6 5 min  x    Step stretches HS, knee flex, calf 3 15 sec x     Quad set with bolster under ankle 20 5 sec x    SLR flex  Supine Hip abduction  20  15 5 sec side lying hip abd  15x 3 sec     Heel slide AAROM 20 5 sec x 3-120 deg   SL hip ABD 15 5 sec  Cues to keep foot neutral   Prone hang   Prone hip extension lift 2 min  15x   3 sec    3# ankle weight   Total gym squats  SL squat R 25  15  x  x    Standing ON FOAM:  hip abd B, HR/TR, hamstring curl B, mini squats, march B, hip ext 15  x No squats today   Step-ups 6 inch- forward, lateral RCC 20  x    Nautilus leg press R 2x10 50# x    Nautilus leg extension R 3x10 15# x    Nautilus hamstring curl R 3x10 50# X    Mini forward lunges- B 10  x      Specific Interventions Next Treatment: ROM, hip strength    Activity/Treatment Tolerance:  [x]  Patient tolerated treatment well  []  Patient limited by fatigue  []  Patient limited by pain   []  Patient limited by medical complications  []  Other:     Assessment:  Pt demos good progress toward goals, meeting or nearly meeting all goals. Pt lacks 3 degrees knee extension but lacked 5 degrees prior to surgery. Pt has moderate difficulty descending stairs but stairs get easier every day. Strength has improved but gluteus medius remains weak. Pt demos 16% impairment on KOOS Jr. Pt demos good understanding of HEP and compliance. Plan to continue strengthening at fitness center. No further PT warranted at this time. 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 GOAL NOT MET: right hip flexion 4+/5, extension 5/5, ABD 4/5, knee 5-/5. 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  GOAL MET:  KOOS Jr score 16%

## 2023-09-20 ENCOUNTER — APPOINTMENT (OUTPATIENT)
Dept: CT IMAGING | Age: 73
End: 2023-09-20
Payer: MEDICARE

## 2023-09-20 ENCOUNTER — HOSPITAL ENCOUNTER (EMERGENCY)
Age: 73
Discharge: HOME | End: 2023-09-21
Attending: EMERGENCY MEDICINE
Payer: MEDICARE

## 2023-09-20 DIAGNOSIS — R42 VERTIGO: Primary | ICD-10-CM

## 2023-09-20 LAB
ALBUMIN SERPL BCG-MCNC: 4 G/DL (ref 3.5–5.1)
ALP SERPL-CCNC: 45 U/L (ref 38–126)
ALT SERPL W/O P-5'-P-CCNC: 29 U/L (ref 11–66)
ANION GAP SERPL CALC-SCNC: 11 MEQ/L (ref 8–16)
AST SERPL-CCNC: 32 U/L (ref 5–40)
BASOPHILS ABSOLUTE: 0 THOU/MM3 (ref 0–0.1)
BASOPHILS NFR BLD AUTO: 0.5 %
BILIRUB SERPL-MCNC: 0.6 MG/DL (ref 0.3–1.2)
BUN SERPL-MCNC: 20 MG/DL (ref 7–22)
CALCIUM SERPL-MCNC: 9.3 MG/DL (ref 8.5–10.5)
CHLORIDE SERPL-SCNC: 103 MEQ/L (ref 98–111)
CO2 SERPL-SCNC: 26 MEQ/L (ref 23–33)
CREAT SERPL-MCNC: 1.1 MG/DL (ref 0.4–1.2)
DEPRECATED RDW RBC AUTO: 49.5 FL (ref 35–45)
EOSINOPHIL NFR BLD AUTO: 2.7 %
EOSINOPHILS ABSOLUTE: 0.2 THOU/MM3 (ref 0–0.4)
ERYTHROCYTE [DISTWIDTH] IN BLOOD BY AUTOMATED COUNT: 13.6 % (ref 11.5–14.5)
GFR SERPL CREATININE-BSD FRML MDRD: > 60 ML/MIN/1.73M2
GLUCOSE BLD STRIP.AUTO-MCNC: 125 MG/DL (ref 70–108)
GLUCOSE SERPL-MCNC: 140 MG/DL (ref 70–108)
HCT VFR BLD AUTO: 45.5 % (ref 42–52)
HGB BLD-MCNC: 15 GM/DL (ref 14–18)
IMM GRANULOCYTES # BLD AUTO: 0.01 THOU/MM3 (ref 0–0.07)
IMM GRANULOCYTES NFR BLD AUTO: 0.1 %
LYMPHOCYTES ABSOLUTE: 1.8 THOU/MM3 (ref 1–4.8)
LYMPHOCYTES NFR BLD AUTO: 21.4 %
MCH RBC QN AUTO: 32.8 PG (ref 26–33)
MCHC RBC AUTO-ENTMCNC: 33 GM/DL (ref 32.2–35.5)
MCV RBC AUTO: 99.3 FL (ref 80–94)
MONOCYTES ABSOLUTE: 0.9 THOU/MM3 (ref 0.4–1.3)
MONOCYTES NFR BLD AUTO: 10 %
NEUTROPHILS NFR BLD AUTO: 65.3 %
NRBC BLD AUTO-RTO: 0 /100 WBC
OSMOLALITY SERPL CALC.SUM OF ELEC: 284.3 MOSMOL/KG (ref 275–300)
PLATELET # BLD AUTO: 245 THOU/MM3 (ref 130–400)
PMV BLD AUTO: 10.1 FL (ref 9.4–12.4)
POTASSIUM SERPL-SCNC: 4.3 MEQ/L (ref 3.5–5.2)
PROT SERPL-MCNC: 6.6 G/DL (ref 6.1–8)
RBC # BLD AUTO: 4.58 MILL/MM3 (ref 4.7–6.1)
SEGMENTED NEUTROPHILS ABSOLUTE COUNT: 5.6 THOU/MM3 (ref 1.8–7.7)
SODIUM SERPL-SCNC: 140 MEQ/L (ref 135–145)
TROPONIN, HIGH SENSITIVITY: 38 NG/L (ref 0–12)
WBC # BLD AUTO: 8.5 THOU/MM3 (ref 4.8–10.8)

## 2023-09-20 PROCEDURE — 70498 CT ANGIOGRAPHY NECK: CPT

## 2023-09-20 PROCEDURE — 99285 EMERGENCY DEPT VISIT HI MDM: CPT

## 2023-09-20 PROCEDURE — 84484 ASSAY OF TROPONIN QUANT: CPT

## 2023-09-20 PROCEDURE — 85025 COMPLETE CBC W/AUTO DIFF WBC: CPT

## 2023-09-20 PROCEDURE — 80053 COMPREHEN METABOLIC PANEL: CPT

## 2023-09-20 PROCEDURE — 70496 CT ANGIOGRAPHY HEAD: CPT

## 2023-09-20 PROCEDURE — 6360000004 HC RX CONTRAST MEDICATION: Performed by: EMERGENCY MEDICINE

## 2023-09-20 PROCEDURE — 36415 COLL VENOUS BLD VENIPUNCTURE: CPT

## 2023-09-20 PROCEDURE — 70450 CT HEAD/BRAIN W/O DYE: CPT

## 2023-09-20 PROCEDURE — 93005 ELECTROCARDIOGRAM TRACING: CPT | Performed by: EMERGENCY MEDICINE

## 2023-09-20 PROCEDURE — 82948 REAGENT STRIP/BLOOD GLUCOSE: CPT

## 2023-09-20 PROCEDURE — 6370000000 HC RX 637 (ALT 250 FOR IP): Performed by: EMERGENCY MEDICINE

## 2023-09-20 PROCEDURE — 2580000003 HC RX 258: Performed by: EMERGENCY MEDICINE

## 2023-09-20 RX ORDER — 0.9 % SODIUM CHLORIDE 0.9 %
500 INTRAVENOUS SOLUTION INTRAVENOUS ONCE
Status: COMPLETED | OUTPATIENT
Start: 2023-09-20 | End: 2023-09-21

## 2023-09-20 RX ORDER — MECLIZINE HCL 25MG 25 MG/1
25 TABLET, CHEWABLE ORAL ONCE
Status: COMPLETED | OUTPATIENT
Start: 2023-09-20 | End: 2023-09-20

## 2023-09-20 RX ADMIN — MECLIZINE HYDROCHLORIDE 25 MG: 25 TABLET, CHEWABLE ORAL at 22:53

## 2023-09-20 RX ADMIN — IOPAMIDOL 80 ML: 755 INJECTION, SOLUTION INTRAVENOUS at 22:31

## 2023-09-20 RX ADMIN — SODIUM CHLORIDE 500 ML: 9 INJECTION, SOLUTION INTRAVENOUS at 22:14

## 2023-09-20 ASSESSMENT — PAIN - FUNCTIONAL ASSESSMENT: PAIN_FUNCTIONAL_ASSESSMENT: NONE - DENIES PAIN

## 2023-09-21 VITALS
RESPIRATION RATE: 17 BRPM | HEART RATE: 59 BPM | BODY MASS INDEX: 26.82 KG/M2 | HEIGHT: 72 IN | DIASTOLIC BLOOD PRESSURE: 74 MMHG | WEIGHT: 198 LBS | SYSTOLIC BLOOD PRESSURE: 112 MMHG | OXYGEN SATURATION: 94 % | TEMPERATURE: 98.2 F

## 2023-09-21 LAB — TROPONIN, HIGH SENSITIVITY: 39 NG/L (ref 0–12)

## 2023-09-21 PROCEDURE — 93010 ELECTROCARDIOGRAM REPORT: CPT | Performed by: NUCLEAR MEDICINE

## 2023-09-21 RX ORDER — MECLIZINE HYDROCHLORIDE 25 MG/1
25 TABLET ORAL 3 TIMES DAILY PRN
Qty: 15 TABLET | Refills: 0 | Status: SHIPPED | OUTPATIENT
Start: 2023-09-21 | End: 2023-10-01

## 2023-09-21 NOTE — ED PROVIDER NOTES
Ventricular Rate 73 BPM    Atrial Rate 73 BPM    P-R Interval 140 ms    QRS Duration 142 ms    Q-T Interval 424 ms    QTc Calculation (Bazett) 467 ms    P Axis 71 degrees    R Axis 67 degrees    T Axis 54 degrees     LABS: (None if blank)  Results for orders placed or performed during the hospital encounter of 09/20/23   CBC with Auto Differential   Result Value Ref Range    WBC 8.5 4.8 - 10.8 thou/mm3    RBC 4.58 (L) 4.70 - 6.10 mill/mm3    Hemoglobin 15.0 14.0 - 18.0 gm/dl    Hematocrit 45.5 42.0 - 52.0 %    MCV 99.3 (H) 80.0 - 94.0 fL    MCH 32.8 26.0 - 33.0 pg    MCHC 33.0 32.2 - 35.5 gm/dl    RDW-CV 13.6 11.5 - 14.5 %    RDW-SD 49.5 (H) 35.0 - 45.0 fL    Platelets 795 293 - 201 thou/mm3    MPV 10.1 9.4 - 12.4 fL    Seg Neutrophils 65.3 %    Lymphocytes 21.4 %    Monocytes 10.0 %    Eosinophils 2.7 %    Basophils 0.5 %    Immature Granulocytes 0.1 %    Segs Absolute 5.6 1.8 - 7.7 thou/mm3    Lymphocytes Absolute 1.8 1.0 - 4.8 thou/mm3    Monocytes Absolute 0.9 0.4 - 1.3 thou/mm3    Eosinophils Absolute 0.2 0.0 - 0.4 thou/mm3    Basophils Absolute 0.0 0.0 - 0.1 thou/mm3    Immature Grans (Abs) 0.01 0.00 - 0.07 thou/mm3    nRBC 0 /100 wbc   Comprehensive Metabolic Panel   Result Value Ref Range    Glucose 140 (H) 70 - 108 mg/dL    Creatinine 1.1 0.4 - 1.2 mg/dL    BUN 20 7 - 22 mg/dL    Sodium 140 135 - 145 meq/L    Potassium 4.3 3.5 - 5.2 meq/L    Chloride 103 98 - 111 meq/L    CO2 26 23 - 33 meq/L    Calcium 9.3 8.5 - 10.5 mg/dL    AST 32 5 - 40 U/L    Alkaline Phosphatase 45 38 - 126 U/L    Total Protein 6.6 6.1 - 8.0 g/dL    Albumin 4.0 3.5 - 5.1 g/dL    Total Bilirubin 0.6 0.3 - 1.2 mg/dL    ALT 29 11 - 66 U/L   Troponin   Result Value Ref Range    Troponin, High Sensitivity 38 (H) 0 - 12 ng/L   Anion Gap   Result Value Ref Range    Anion Gap 11.0 8.0 - 16.0 meq/L   Glomerular Filtration Rate, Estimated   Result Value Ref Range    Est, Glom Filt Rate >60 >60 ml/min/1.73m2   Osmolality   Result Value Ref Range (if there is any). Previous patient encounter documents & history available on EMR was reviewed:   Yes (for relevant previous encounters)    See Formal Diagnostic Results above for the lab and radiology tests and orders. 3)  Treatment and Disposition    ED Reassessment:    Stable ED stay    Vitals:    09/20/23 2158 09/20/23 2227 09/20/23 2357 09/21/23 0057   BP: 120/78 121/67 111/73 112/74   Pulse: 71 67 65 59   Resp: 12 15 10 17   Temp:       TempSrc:       SpO2: 94% 92% 94% 94%   Weight:       Height:           Shared Decision-Making:   Treatment plan and disposition discussed with the patient/family, questions answered     FINAL IMPRESSION      1. Vertigo        DISPOSITION/PLAN   DISPOSITION Decision To Discharge 09/21/2023 12:47:12 AM    OUTPATIENT FOLLOW UP THE PATIENT:  Loren Spaulding MD  [address] [place] [phone]    In 1 week  ED discharge follow-up.   Consider brain MRI    DISCHARGE MEDICATIONS:(None if blank)  Discharge Medication List as of 9/21/2023  1:12 AM        START taking these medications    Details   meclizine (ANTIVERT) 25 MG tablet Take 1 tablet by mouth 3 times daily as needed for Dizziness, Disp-15 tablet, R-0Normal             MD Aren Romero MD  09/21/23 [id number]

## 2023-09-21 NOTE — ED TRIAGE NOTES
Patient presents to ED with chief complaint of dizziness. Patient states he has been expierencing them on and off for about a week and usually occur in the morning. Patient states that this evening he was getting up from the couch and became dizzy and lost his balance and almost fell. Orthostatic vital signs completed at time of triage and were negative. Patient resting in bed. Respirations easy and unlabored. No distress noted. Call light within reach.

## 2023-09-21 NOTE — ED NOTES
Patient medicated per MAR at this time. Patient resting in bed. Respirations easy and unlabored. No distress noted. Call light within reach. Family at the bedside.       Audra Mix RN  09/20/23 3885

## 2023-09-23 LAB
EKG ATRIAL RATE: 73 BPM
EKG P AXIS: 71 DEGREES
EKG P-R INTERVAL: 140 MS
EKG Q-T INTERVAL: 424 MS
EKG QRS DURATION: 142 MS
EKG QTC CALCULATION (BAZETT): 467 MS
EKG R AXIS: 67 DEGREES
EKG T AXIS: 54 DEGREES
EKG VENTRICULAR RATE: 73 BPM

## 2024-03-27 ENCOUNTER — HOSPITAL ENCOUNTER (OUTPATIENT)
Age: 74
Discharge: HOME | End: 2024-03-27
Payer: MEDICARE

## 2024-03-27 LAB
ALT SERPL W/O P-5'-P-CCNC: 32 U/L (ref 11–66)
BASOPHILS ABSOLUTE: 0 THOU/MM3 (ref 0–0.1)
BASOPHILS NFR BLD AUTO: 0.5 %
CREAT SERPL-MCNC: 1.2 MG/DL (ref 0.4–1.2)
DEPRECATED RDW RBC AUTO: 50 FL (ref 35–45)
EOSINOPHIL NFR BLD AUTO: 1.2 %
EOSINOPHILS ABSOLUTE: 0.1 THOU/MM3 (ref 0–0.4)
ERYTHROCYTE [DISTWIDTH] IN BLOOD BY AUTOMATED COUNT: 13.9 % (ref 11.5–14.5)
ERYTHROCYTE [SEDIMENTATION RATE] IN BLOOD BY WESTERGREN METHOD: 4 MM/HR (ref 0–10)
GFR SERPL CREATININE-BSD FRML MDRD: 64 ML/MIN/1.73M2
HCT VFR BLD AUTO: 46.5 % (ref 42–52)
HGB BLD-MCNC: 15.5 GM/DL (ref 14–18)
IMM GRANULOCYTES # BLD AUTO: 0.03 THOU/MM3 (ref 0–0.07)
IMM GRANULOCYTES NFR BLD AUTO: 0.4 %
LYMPHOCYTES ABSOLUTE: 1.4 THOU/MM3 (ref 1–4.8)
LYMPHOCYTES NFR BLD AUTO: 19.3 %
MCH RBC QN AUTO: 33.3 PG (ref 26–33)
MCHC RBC AUTO-ENTMCNC: 33.3 GM/DL (ref 32.2–35.5)
MCV RBC AUTO: 99.8 FL (ref 80–94)
MONOCYTES ABSOLUTE: 0.6 THOU/MM3 (ref 0.4–1.3)
MONOCYTES NFR BLD AUTO: 8.3 %
NEUTROPHILS NFR BLD AUTO: 70.3 %
NRBC BLD AUTO-RTO: 0 /100 WBC
PLATELET # BLD AUTO: 273 THOU/MM3 (ref 130–400)
PMV BLD AUTO: 10.3 FL (ref 9.4–12.4)
RBC # BLD AUTO: 4.66 MILL/MM3 (ref 4.7–6.1)
SEGMENTED NEUTROPHILS ABSOLUTE COUNT: 5.3 THOU/MM3 (ref 1.8–7.7)
WBC # BLD AUTO: 7.5 THOU/MM3 (ref 4.8–10.8)

## 2024-03-27 PROCEDURE — 85651 RBC SED RATE NONAUTOMATED: CPT

## 2024-03-27 PROCEDURE — 82565 ASSAY OF CREATININE: CPT

## 2024-03-27 PROCEDURE — 85025 COMPLETE CBC W/AUTO DIFF WBC: CPT

## 2024-03-27 PROCEDURE — 84460 ALANINE AMINO (ALT) (SGPT): CPT

## 2024-03-27 PROCEDURE — 36415 COLL VENOUS BLD VENIPUNCTURE: CPT

## 2024-07-10 ENCOUNTER — HOSPITAL ENCOUNTER (OUTPATIENT)
Age: 74
Discharge: HOME | End: 2024-07-10
Payer: MEDICARE

## 2024-07-10 LAB
ALT SERPL W/O P-5'-P-CCNC: 38 U/L (ref 11–66)
AST SERPL-CCNC: 30 U/L (ref 5–40)
BASOPHILS ABSOLUTE: 0 THOU/MM3 (ref 0–0.1)
BASOPHILS NFR BLD AUTO: 0.5 %
CREAT SERPL-MCNC: 1.1 MG/DL (ref 0.4–1.2)
DEPRECATED RDW RBC AUTO: 47.9 FL (ref 35–45)
EOSINOPHIL NFR BLD AUTO: 2 %
EOSINOPHILS ABSOLUTE: 0.1 THOU/MM3 (ref 0–0.4)
ERYTHROCYTE [DISTWIDTH] IN BLOOD BY AUTOMATED COUNT: 13 % (ref 11.5–14.5)
ERYTHROCYTE [SEDIMENTATION RATE] IN BLOOD BY WESTERGREN METHOD: 6 MM/HR (ref 0–10)
GFR SERPL CREATININE-BSD FRML MDRD: 71 ML/MIN/1.73M2
HCT VFR BLD AUTO: 46.1 % (ref 42–52)
HGB BLD-MCNC: 15.1 GM/DL (ref 14–18)
IMM GRANULOCYTES # BLD AUTO: 0.02 THOU/MM3 (ref 0–0.07)
IMM GRANULOCYTES NFR BLD AUTO: 0.3 %
LYMPHOCYTES ABSOLUTE: 1.6 THOU/MM3 (ref 1–4.8)
LYMPHOCYTES NFR BLD AUTO: 24.6 %
MCH RBC QN AUTO: 32.9 PG (ref 26–33)
MCHC RBC AUTO-ENTMCNC: 32.8 GM/DL (ref 32.2–35.5)
MCV RBC AUTO: 100.4 FL (ref 80–94)
MONOCYTES ABSOLUTE: 0.7 THOU/MM3 (ref 0.4–1.3)
MONOCYTES NFR BLD AUTO: 10.2 %
NEUTROPHILS ABSOLUTE: 4 THOU/MM3 (ref 1.8–7.7)
NEUTROPHILS NFR BLD AUTO: 62.4 %
NRBC BLD AUTO-RTO: 0 /100 WBC
PLATELET # BLD AUTO: 220 THOU/MM3 (ref 130–400)
PMV BLD AUTO: 10.2 FL (ref 9.4–12.4)
RBC # BLD AUTO: 4.59 MILL/MM3 (ref 4.7–6.1)
WBC # BLD AUTO: 6.4 THOU/MM3 (ref 4.8–10.8)

## 2024-07-10 PROCEDURE — 85025 COMPLETE CBC W/AUTO DIFF WBC: CPT

## 2024-07-10 PROCEDURE — 84450 TRANSFERASE (AST) (SGOT): CPT

## 2024-07-10 PROCEDURE — 36415 COLL VENOUS BLD VENIPUNCTURE: CPT

## 2024-07-10 PROCEDURE — 84460 ALANINE AMINO (ALT) (SGPT): CPT

## 2024-07-10 PROCEDURE — 82565 ASSAY OF CREATININE: CPT

## 2024-07-10 PROCEDURE — 85651 RBC SED RATE NONAUTOMATED: CPT

## 2024-09-11 ENCOUNTER — HOSPITAL ENCOUNTER (OUTPATIENT)
Age: 74
Discharge: HOME | End: 2024-09-11
Payer: MEDICARE

## 2024-09-11 ENCOUNTER — HOSPITAL ENCOUNTER (OUTPATIENT)
Dept: GENERAL RADIOLOGY | Age: 74
Discharge: HOME | End: 2024-09-11
Attending: FAMILY MEDICINE
Payer: MEDICARE

## 2024-09-11 DIAGNOSIS — M54.50 LUMBAR PAIN: ICD-10-CM

## 2024-09-11 LAB
ALT SERPL W/O P-5'-P-CCNC: 35 U/L (ref 11–66)
AST SERPL-CCNC: 28 U/L (ref 5–40)
BASOPHILS ABSOLUTE: 0 THOU/MM3 (ref 0–0.1)
BASOPHILS NFR BLD AUTO: 0.6 %
CREAT SERPL-MCNC: 1.2 MG/DL (ref 0.4–1.2)
DEPRECATED RDW RBC AUTO: 47.7 FL (ref 35–45)
EOSINOPHIL NFR BLD AUTO: 2.4 %
EOSINOPHILS ABSOLUTE: 0.2 THOU/MM3 (ref 0–0.4)
ERYTHROCYTE [DISTWIDTH] IN BLOOD BY AUTOMATED COUNT: 12.9 % (ref 11.5–14.5)
GFR SERPL CREATININE-BSD FRML MDRD: 63 ML/MIN/1.73M2
HCT VFR BLD AUTO: 47 % (ref 42–52)
HGB BLD-MCNC: 15.1 GM/DL (ref 14–18)
IMM GRANULOCYTES # BLD AUTO: 0.02 THOU/MM3 (ref 0–0.07)
IMM GRANULOCYTES NFR BLD AUTO: 0.3 %
LYMPHOCYTES ABSOLUTE: 1.6 THOU/MM3 (ref 1–4.8)
LYMPHOCYTES NFR BLD AUTO: 24.1 %
MCH RBC QN AUTO: 32.5 PG (ref 26–33)
MCHC RBC AUTO-ENTMCNC: 32.1 GM/DL (ref 32.2–35.5)
MCV RBC AUTO: 101.1 FL (ref 80–94)
MONOCYTES ABSOLUTE: 0.5 THOU/MM3 (ref 0.4–1.3)
MONOCYTES NFR BLD AUTO: 7 %
NEUTROPHILS ABSOLUTE: 4.4 THOU/MM3 (ref 1.8–7.7)
NEUTROPHILS NFR BLD AUTO: 65.6 %
NRBC BLD AUTO-RTO: 0 /100 WBC
PLATELET # BLD AUTO: 273 THOU/MM3 (ref 130–400)
PMV BLD AUTO: 10.2 FL (ref 9.4–12.4)
RBC # BLD AUTO: 4.65 MILL/MM3 (ref 4.7–6.1)
WBC # BLD AUTO: 6.7 THOU/MM3 (ref 4.8–10.8)

## 2024-09-11 PROCEDURE — 82565 ASSAY OF CREATININE: CPT

## 2024-09-11 PROCEDURE — 84450 TRANSFERASE (AST) (SGOT): CPT

## 2024-09-11 PROCEDURE — 36415 COLL VENOUS BLD VENIPUNCTURE: CPT

## 2024-09-11 PROCEDURE — 85651 RBC SED RATE NONAUTOMATED: CPT

## 2024-09-11 PROCEDURE — 72100 X-RAY EXAM L-S SPINE 2/3 VWS: CPT

## 2024-09-11 PROCEDURE — 84460 ALANINE AMINO (ALT) (SGPT): CPT

## 2024-09-11 PROCEDURE — 73502 X-RAY EXAM HIP UNI 2-3 VIEWS: CPT

## 2024-09-11 PROCEDURE — 85025 COMPLETE CBC W/AUTO DIFF WBC: CPT

## 2024-09-12 LAB — ERYTHROCYTE [SEDIMENTATION RATE] IN BLOOD BY WESTERGREN METHOD: 12 MM/HR (ref 0–10)

## 2024-10-19 ENCOUNTER — HOSPITAL ENCOUNTER (OUTPATIENT)
Dept: MRI IMAGING | Age: 74
Discharge: HOME | End: 2024-10-19
Attending: FAMILY MEDICINE
Payer: MEDICARE

## 2024-10-19 DIAGNOSIS — M54.32 BILATERAL SCIATICA: ICD-10-CM

## 2024-10-19 DIAGNOSIS — M54.31 BILATERAL SCIATICA: ICD-10-CM

## 2024-10-19 PROCEDURE — 72148 MRI LUMBAR SPINE W/O DYE: CPT

## 2024-12-02 ENCOUNTER — HOSPITAL ENCOUNTER (OUTPATIENT)
Age: 74
Discharge: HOME | End: 2024-12-02
Payer: MEDICARE

## 2024-12-02 LAB
ALT SERPL W/O P-5'-P-CCNC: 24 U/L (ref 11–66)
AST SERPL-CCNC: 22 U/L (ref 5–40)
BASOPHILS ABSOLUTE: 0 THOU/MM3 (ref 0–0.1)
BASOPHILS NFR BLD AUTO: 0.6 %
CREAT SERPL-MCNC: 1.1 MG/DL (ref 0.4–1.2)
DEPRECATED RDW RBC AUTO: 50.6 FL (ref 35–45)
EOSINOPHIL NFR BLD AUTO: 2.5 %
EOSINOPHILS ABSOLUTE: 0.2 THOU/MM3 (ref 0–0.4)
ERYTHROCYTE [DISTWIDTH] IN BLOOD BY AUTOMATED COUNT: 13.8 % (ref 11.5–14.5)
ERYTHROCYTE [SEDIMENTATION RATE] IN BLOOD BY WESTERGREN METHOD: 2 MM/HR (ref 0–10)
GFR SERPL CREATININE-BSD FRML MDRD: 70 ML/MIN/1.73M2
HCT VFR BLD AUTO: 46.4 % (ref 42–52)
HGB BLD-MCNC: 15.4 GM/DL (ref 14–18)
IMM GRANULOCYTES # BLD AUTO: 0.02 THOU/MM3 (ref 0–0.07)
IMM GRANULOCYTES NFR BLD AUTO: 0.3 %
LYMPHOCYTES ABSOLUTE: 1.7 THOU/MM3 (ref 1–4.8)
LYMPHOCYTES NFR BLD AUTO: 23.3 %
MCH RBC QN AUTO: 32.5 PG (ref 26–33)
MCHC RBC AUTO-ENTMCNC: 33.2 GM/DL (ref 32.2–35.5)
MCV RBC AUTO: 97.9 FL (ref 80–94)
MONOCYTES ABSOLUTE: 0.8 THOU/MM3 (ref 0.4–1.3)
MONOCYTES NFR BLD AUTO: 11.1 %
NEUTROPHILS ABSOLUTE: 4.5 THOU/MM3 (ref 1.8–7.7)
NEUTROPHILS NFR BLD AUTO: 62.2 %
NRBC BLD AUTO-RTO: 0 /100 WBC
PLATELET # BLD AUTO: 245 THOU/MM3 (ref 130–400)
PMV BLD AUTO: 9.9 FL (ref 9.4–12.4)
RBC # BLD AUTO: 4.74 MILL/MM3 (ref 4.7–6.1)
WBC # BLD AUTO: 7.2 THOU/MM3 (ref 4.8–10.8)

## 2024-12-02 PROCEDURE — 36415 COLL VENOUS BLD VENIPUNCTURE: CPT

## 2024-12-02 PROCEDURE — 85651 RBC SED RATE NONAUTOMATED: CPT

## 2024-12-02 PROCEDURE — 84450 TRANSFERASE (AST) (SGOT): CPT

## 2024-12-02 PROCEDURE — 84460 ALANINE AMINO (ALT) (SGPT): CPT

## 2024-12-02 PROCEDURE — 82565 ASSAY OF CREATININE: CPT

## 2024-12-02 PROCEDURE — 85025 COMPLETE CBC W/AUTO DIFF WBC: CPT

## 2024-12-17 NOTE — PROGRESS NOTES
Department of Orthopedic Surgery  Spine Service  Attending Progress Note        Subjective:  Patient continues to do well, back pain controlled. He denies leg pain or numbness/tingling. Denies leg pain or numbness/tingling. No BM. +flatus. Esparza removed and pt voiding without issues. Denies any N/V/CP/SOB. Vitals  VITALS:  /65   Pulse 82   Temp 98 °F (36.7 °C) (Oral)   Resp 18   Ht 6' (1.829 m)   Wt 192 lb 3.2 oz (87.2 kg)   SpO2 95%   BMI 26.07 kg/m²   24HR INTAKE/OUTPUT:      Intake/Output Summary (Last 24 hours) at 2/14/2021 0855  Last data filed at 2/14/2021 0736  Gross per 24 hour   Intake 3342.55 ml   Output 2060 ml   Net 1282.55 ml     URINARY CATHETER OUTPUT (Esparza):  [REMOVED] Urethral Catheter-Output (mL): 300 mL  DRAIN/TUBE OUTPUT:  Closed/Suction Drain Inferior;Midline;Right Back Accordion-Output (ml): 100 ml  Closed/Suction Drain Left-Output (ml): 85 ml    PHYSICAL EXAM:    Orientation:  alert and oriented to person, place and time    Incision:  dressing in place, clean, dry, intact    Lower Extremity Motor :  quadriceps, extensor hallucis longus, dorsiflexion, plantarflexion 5/5 bilaterally  Lower Extremity Sensory:  Intact L1-S1    Flatus:  positive      LABS:    HgB:    Lab Results   Component Value Date    HGB 9.2 02/14/2021       ASSESSMENT AND PLAN:    Post operative day 2 status post L2-S1 decompression and fusion     1:  Monitor labs and drain output- 185 mL total over last 8 hours  2:  Activity Level:  Weight bearing as tolerated. PT/OT  3:  Pain Control:  PRN PO narcotics   4:  Discharge Planning: Discharge home today pending BM.   for drain management    Richie Caceres PA-C Patient dropped off [facility] paperwork - she delivered 11/25 and needs these completed by Friday 12/20

## 2024-12-19 ENCOUNTER — HOSPITAL ENCOUNTER (OUTPATIENT)
Age: 74
Discharge: HOME | End: 2024-12-19
Payer: MEDICARE

## 2024-12-19 ENCOUNTER — HOSPITAL ENCOUNTER (OUTPATIENT)
Dept: GENERAL RADIOLOGY | Age: 74
Discharge: HOME | End: 2024-12-19
Payer: MEDICARE

## 2024-12-19 DIAGNOSIS — Z01.818 PREOP TESTING: ICD-10-CM

## 2024-12-19 LAB
ALBUMIN SERPL BCG-MCNC: 4.3 G/DL (ref 3.5–5.1)
ALP SERPL-CCNC: 50 U/L (ref 38–126)
ALT SERPL W/O P-5'-P-CCNC: 40 U/L (ref 11–66)
ANION GAP SERPL CALC-SCNC: 12 MEQ/L (ref 8–16)
AST SERPL-CCNC: 31 U/L (ref 5–40)
BILIRUB SERPL-MCNC: 0.6 MG/DL (ref 0.3–1.2)
BUN SERPL-MCNC: 23 MG/DL (ref 7–22)
CALCIUM SERPL-MCNC: 10 MG/DL (ref 8.5–10.5)
CHLORIDE SERPL-SCNC: 103 MEQ/L (ref 98–111)
CO2 SERPL-SCNC: 26 MEQ/L (ref 23–33)
CREAT SERPL-MCNC: 1.2 MG/DL (ref 0.4–1.2)
DEPRECATED RDW RBC AUTO: 48.1 FL (ref 35–45)
ERYTHROCYTE [DISTWIDTH] IN BLOOD BY AUTOMATED COUNT: 13.2 % (ref 11.5–14.5)
GFR SERPL CREATININE-BSD FRML MDRD: 63 ML/MIN/1.73M2
GLUCOSE SERPL-MCNC: 92 MG/DL (ref 70–108)
HCT VFR BLD AUTO: 47.4 % (ref 42–52)
HGB BLD-MCNC: 15.5 GM/DL (ref 14–18)
MCH RBC QN AUTO: 32.4 PG (ref 26–33)
MCHC RBC AUTO-ENTMCNC: 32.7 GM/DL (ref 32.2–35.5)
MCV RBC AUTO: 99 FL (ref 80–94)
PLATELET # BLD AUTO: 232 THOU/MM3 (ref 130–400)
PMV BLD AUTO: 9.7 FL (ref 9.4–12.4)
POTASSIUM SERPL-SCNC: 4.9 MEQ/L (ref 3.5–5.2)
PROT SERPL-MCNC: 7.2 G/DL (ref 6.1–8)
RBC # BLD AUTO: 4.79 MILL/MM3 (ref 4.7–6.1)
SODIUM SERPL-SCNC: 141 MEQ/L (ref 135–145)
WBC # BLD AUTO: 5.5 THOU/MM3 (ref 4.8–10.8)

## 2024-12-19 PROCEDURE — 36415 COLL VENOUS BLD VENIPUNCTURE: CPT

## 2024-12-19 PROCEDURE — 80053 COMPREHEN METABOLIC PANEL: CPT

## 2024-12-19 PROCEDURE — 71046 X-RAY EXAM CHEST 2 VIEWS: CPT

## 2024-12-19 PROCEDURE — 85027 COMPLETE CBC AUTOMATED: CPT

## 2024-12-19 PROCEDURE — 93005 ELECTROCARDIOGRAM TRACING: CPT | Performed by: PODIATRIST

## 2024-12-21 LAB
EKG ATRIAL RATE: 59 BPM
EKG P AXIS: 39 DEGREES
EKG P-R INTERVAL: 128 MS
EKG Q-T INTERVAL: 444 MS
EKG QRS DURATION: 144 MS
EKG QTC CALCULATION (BAZETT): 439 MS
EKG R AXIS: 36 DEGREES
EKG T AXIS: 51 DEGREES
EKG VENTRICULAR RATE: 59 BPM

## 2024-12-26 ENCOUNTER — ANESTHESIA EVENT (OUTPATIENT)
Dept: OPERATING ROOM | Age: 74
End: 2024-12-26
Payer: MEDICARE

## 2024-12-26 NOTE — PROGRESS NOTES
EKG 12/19/24 given to anesthesia for review. Per Dr. Shira uriarte to proceed at the surgery center without further intervention

## 2024-12-30 RX ORDER — DICLOFENAC SODIUM 75 MG/1
75 TABLET, DELAYED RELEASE ORAL DAILY
COMMUNITY

## 2024-12-30 RX ORDER — PRAVASTATIN SODIUM 10 MG
10 TABLET ORAL DAILY
COMMUNITY

## 2024-12-30 NOTE — PROGRESS NOTES
NPO after midnight (may have 8 oz of water up to 2 hours before surgery)  Bring insurance info and drivers license  Wear comfortable clean clothing  Do not bring jewelry  Shower night before and morning of surgery with a liquid antibacterial soap  Bring list of medications with dosage and how often taken  Follow all instructions given by your physician   needed at discharge  You must have a responsible adult with you day of surgery and for 24 hours after surgery  Call [phone] for any questions

## 2024-12-30 NOTE — PROGRESS NOTES
In preparation for their surgical procedure above patient was screened for Obstructive Sleep Apnea (VALDEZ) using the STOP-Bang Questionnaire by the Pre-Admission Testing department.  This is a pre-surgical screening tool for patient safety and serves as a recommendation, this WILL NOT cause cancellation of surgery.    STOP-Bang Questionnaire  * Do you currently see a pulmonologist?  No     If yes STOP, do not complete.  Patient follows with Dr.     1.  Do you snore loudly (able to be heard in the next room)?      No    2.  Do you often feel tired or sleepy during the daytime?          No       3.  Has anyone ever told you that you stop breathing during your sleep?       No    4.  Do you have or are you being treated for high blood pressure?          Yes      5.  BMI more than 35?  BMI (Calculated): 26.8        No    6.  Age over 50 years? 74 y.o.      Yes    7.  Neck Circumference greater than 17 inches for male or 16 inches for female?  Measured           (visits only)            Not Applicable    8.  Gender Male?                 Yes      TOTAL SCORE: 3    VALDEZ - Low Risk : Yes to 0 - 2 questions  VALDEZ - Intermediate Risk : Yes to 3 - 4 questions  VALDEZ - High Risk : Yes to 5 - 8 questions    Adapted from:   STOP Questionnaire: A Tool to Screen Patients for Obstructive Sleep Apnea   ROBBIE Mitchell.R.C.P.C., Popeye Javier M.B.B.S., Rob Mccrary M.D., Beth Keller, Ph.D., SAWYER Hoyos.B.B.S., SAWYER Leos.Sc., Tyree Morataya M.D., Marco Mcclain F.R.C.P.C.   Anesthesiology 2008; 108:812-21 Copyright 2008, the American Society of Anesthesiologists, Inc. [employer]   ----------------------------------------------------------------------------------------------------------------

## 2025-01-08 ENCOUNTER — HOSPITAL ENCOUNTER (OUTPATIENT)
Age: 75
Setting detail: OUTPATIENT SURGERY
Discharge: HOME | End: 2025-01-08
Attending: PODIATRIST | Admitting: PODIATRIST
Payer: MEDICARE

## 2025-01-08 ENCOUNTER — ANESTHESIA (OUTPATIENT)
Dept: OPERATING ROOM | Age: 75
End: 2025-01-08
Payer: MEDICARE

## 2025-01-08 VITALS
OXYGEN SATURATION: 93 % | RESPIRATION RATE: 16 BRPM | BODY MASS INDEX: 26.88 KG/M2 | TEMPERATURE: 96.2 F | SYSTOLIC BLOOD PRESSURE: 140 MMHG | DIASTOLIC BLOOD PRESSURE: 83 MMHG | HEIGHT: 71 IN | WEIGHT: 192 LBS | HEART RATE: 76 BPM

## 2025-01-08 PROCEDURE — 2720000010 HC SURG SUPPLY STERILE: Performed by: PODIATRIST

## 2025-01-08 PROCEDURE — 7100000011 HC PHASE II RECOVERY - ADDTL 15 MIN: Performed by: PODIATRIST

## 2025-01-08 PROCEDURE — 3600000003 HC SURGERY LEVEL 3 BASE: Performed by: PODIATRIST

## 2025-01-08 PROCEDURE — 7100000000 HC PACU RECOVERY - FIRST 15 MIN: Performed by: PODIATRIST

## 2025-01-08 PROCEDURE — 2500000003 HC RX 250 WO HCPCS: Performed by: PODIATRIST

## 2025-01-08 PROCEDURE — 3700000000 HC ANESTHESIA ATTENDED CARE: Performed by: PODIATRIST

## 2025-01-08 PROCEDURE — 2500000003 HC RX 250 WO HCPCS

## 2025-01-08 PROCEDURE — 3700000001 HC ADD 15 MINUTES (ANESTHESIA): Performed by: PODIATRIST

## 2025-01-08 PROCEDURE — 6360000002 HC RX W HCPCS

## 2025-01-08 PROCEDURE — C1713 ANCHOR/SCREW BN/BN,TIS/BN: HCPCS | Performed by: PODIATRIST

## 2025-01-08 PROCEDURE — 2500000003 HC RX 250 WO HCPCS: Performed by: NURSE ANESTHETIST, CERTIFIED REGISTERED

## 2025-01-08 PROCEDURE — 2709999900 HC NON-CHARGEABLE SUPPLY: Performed by: PODIATRIST

## 2025-01-08 PROCEDURE — 7100000010 HC PHASE II RECOVERY - FIRST 15 MIN: Performed by: PODIATRIST

## 2025-01-08 PROCEDURE — 2580000003 HC RX 258

## 2025-01-08 PROCEDURE — 7100000001 HC PACU RECOVERY - ADDTL 15 MIN: Performed by: PODIATRIST

## 2025-01-08 PROCEDURE — 6360000002 HC RX W HCPCS: Performed by: NURSE ANESTHETIST, CERTIFIED REGISTERED

## 2025-01-08 PROCEDURE — 3600000013 HC SURGERY LEVEL 3 ADDTL 15MIN: Performed by: PODIATRIST

## 2025-01-08 DEVICE — 3.5 X 14 MM R3CON LOCKING PLATE SCREW
Type: IMPLANTABLE DEVICE | Site: FOOT | Status: FUNCTIONAL
Brand: GORILLA PLATING SYSTEM

## 2025-01-08 DEVICE — 3.5 X 10 MM R3CON LOCKING PLATE SCREW
Type: IMPLANTABLE DEVICE | Site: FOOT | Status: FUNCTIONAL
Brand: GORILLA PLATING SYSTEM

## 2025-01-08 DEVICE — 3.5 X 20 MM R3CON LOCKING PLATE SCREW
Type: IMPLANTABLE DEVICE | Site: FOOT | Status: FUNCTIONAL
Brand: GORILLA PLATING SYSTEM

## 2025-01-08 DEVICE — MINI MONSTER 4.0 X 26MM HEADLESS SCREW, SHORT THREAD
Type: IMPLANTABLE DEVICE | Site: FOOT | Status: FUNCTIONAL
Brand: MONSTER SCREW SYSTEM

## 2025-01-08 DEVICE — 3.5 X 18 MM R3CON LOCKING PLATE SCREW
Type: IMPLANTABLE DEVICE | Site: FOOT | Status: FUNCTIONAL
Brand: GORILLA PLATING SYSTEM

## 2025-01-08 DEVICE — 3.5 X 18 MM R3CON NON-LOCKING PLATE SCREW
Type: IMPLANTABLE DEVICE | Site: FOOT | Status: FUNCTIONAL
Brand: GORILLA PLATING SYSTEM

## 2025-01-08 DEVICE — MTP, 0 DEGREE, LONG PLATE, LEFT
Type: IMPLANTABLE DEVICE | Site: FOOT | Status: FUNCTIONAL
Brand: GORILLA PLATING SYSTEM

## 2025-01-08 RX ORDER — SODIUM CHLORIDE 9 MG/ML
INJECTION, SOLUTION INTRAVENOUS CONTINUOUS
Status: DISCONTINUED | OUTPATIENT
Start: 2025-01-08 | End: 2025-01-08 | Stop reason: HOSPADM

## 2025-01-08 RX ORDER — SODIUM CHLORIDE 9 MG/ML
INJECTION, SOLUTION INTRAVENOUS CONTINUOUS
Status: CANCELLED | OUTPATIENT
Start: 2025-01-08

## 2025-01-08 RX ORDER — PHENYLEPHRINE HCL IN 0.9% NACL 1 MG/10 ML
SYRINGE (ML) INTRAVENOUS
Status: DISCONTINUED | OUTPATIENT
Start: 2025-01-08 | End: 2025-01-08 | Stop reason: SDUPTHER

## 2025-01-08 RX ORDER — FENTANYL CITRATE 50 UG/ML
INJECTION, SOLUTION INTRAMUSCULAR; INTRAVENOUS
Status: DISCONTINUED | OUTPATIENT
Start: 2025-01-08 | End: 2025-01-08 | Stop reason: SDUPTHER

## 2025-01-08 RX ORDER — OXYCODONE HYDROCHLORIDE 5 MG/1
10 TABLET ORAL EVERY 4 HOURS PRN
Status: CANCELLED | OUTPATIENT
Start: 2025-01-08

## 2025-01-08 RX ORDER — SODIUM CHLORIDE 0.9 % (FLUSH) 0.9 %
5-40 SYRINGE (ML) INJECTION PRN
Status: CANCELLED | OUTPATIENT
Start: 2025-01-08

## 2025-01-08 RX ORDER — DEXAMETHASONE SODIUM PHOSPHATE 10 MG/ML
INJECTION, EMULSION INTRAMUSCULAR; INTRAVENOUS
Status: DISCONTINUED | OUTPATIENT
Start: 2025-01-08 | End: 2025-01-08 | Stop reason: SDUPTHER

## 2025-01-08 RX ORDER — SODIUM CHLORIDE 9 MG/ML
INJECTION, SOLUTION INTRAVENOUS PRN
Status: CANCELLED | OUTPATIENT
Start: 2025-01-08

## 2025-01-08 RX ORDER — SODIUM CHLORIDE 9 MG/ML
INJECTION, SOLUTION INTRAVENOUS PRN
Status: DISCONTINUED | OUTPATIENT
Start: 2025-01-08 | End: 2025-01-08 | Stop reason: HOSPADM

## 2025-01-08 RX ORDER — OXYCODONE HYDROCHLORIDE 5 MG/1
5 TABLET ORAL EVERY 4 HOURS PRN
Status: CANCELLED | OUTPATIENT
Start: 2025-01-08

## 2025-01-08 RX ORDER — SODIUM CHLORIDE 0.9 % (FLUSH) 0.9 %
5-40 SYRINGE (ML) INJECTION PRN
Status: DISCONTINUED | OUTPATIENT
Start: 2025-01-08 | End: 2025-01-08 | Stop reason: HOSPADM

## 2025-01-08 RX ORDER — FENTANYL CITRATE 50 UG/ML
50 INJECTION, SOLUTION INTRAMUSCULAR; INTRAVENOUS EVERY 5 MIN PRN
Status: CANCELLED | OUTPATIENT
Start: 2025-01-08

## 2025-01-08 RX ORDER — ONDANSETRON 2 MG/ML
INJECTION INTRAMUSCULAR; INTRAVENOUS
Status: DISCONTINUED | OUTPATIENT
Start: 2025-01-08 | End: 2025-01-08 | Stop reason: SDUPTHER

## 2025-01-08 RX ORDER — NALOXONE HYDROCHLORIDE 0.4 MG/ML
INJECTION, SOLUTION INTRAMUSCULAR; INTRAVENOUS; SUBCUTANEOUS PRN
Status: CANCELLED | OUTPATIENT
Start: 2025-01-08

## 2025-01-08 RX ORDER — LIDOCAINE HYDROCHLORIDE 20 MG/ML
INJECTION, SOLUTION INTRAVENOUS
Status: DISCONTINUED | OUTPATIENT
Start: 2025-01-08 | End: 2025-01-08 | Stop reason: SDUPTHER

## 2025-01-08 RX ORDER — MORPHINE SULFATE 2 MG/ML
2 INJECTION, SOLUTION INTRAMUSCULAR; INTRAVENOUS
Status: CANCELLED | OUTPATIENT
Start: 2025-01-08

## 2025-01-08 RX ORDER — ONDANSETRON 2 MG/ML
4 INJECTION INTRAMUSCULAR; INTRAVENOUS EVERY 6 HOURS PRN
Status: CANCELLED | OUTPATIENT
Start: 2025-01-08

## 2025-01-08 RX ORDER — ACETAMINOPHEN 325 MG/1
650 TABLET ORAL EVERY 4 HOURS PRN
Status: CANCELLED | OUTPATIENT
Start: 2025-01-08

## 2025-01-08 RX ORDER — PROPOFOL 10 MG/ML
INJECTION, EMULSION INTRAVENOUS
Status: DISCONTINUED | OUTPATIENT
Start: 2025-01-08 | End: 2025-01-08 | Stop reason: SDUPTHER

## 2025-01-08 RX ORDER — SODIUM CHLORIDE 0.9 % (FLUSH) 0.9 %
5-40 SYRINGE (ML) INJECTION EVERY 12 HOURS SCHEDULED
Status: CANCELLED | OUTPATIENT
Start: 2025-01-08

## 2025-01-08 RX ORDER — ROCURONIUM BROMIDE 10 MG/ML
INJECTION, SOLUTION INTRAVENOUS
Status: DISCONTINUED | OUTPATIENT
Start: 2025-01-08 | End: 2025-01-08 | Stop reason: SDUPTHER

## 2025-01-08 RX ORDER — SODIUM CHLORIDE 0.9 % (FLUSH) 0.9 %
5-40 SYRINGE (ML) INJECTION EVERY 12 HOURS SCHEDULED
Status: DISCONTINUED | OUTPATIENT
Start: 2025-01-08 | End: 2025-01-08 | Stop reason: HOSPADM

## 2025-01-08 RX ORDER — MEPERIDINE HYDROCHLORIDE 25 MG/ML
12.5 INJECTION INTRAMUSCULAR; INTRAVENOUS; SUBCUTANEOUS EVERY 5 MIN PRN
Status: CANCELLED | OUTPATIENT
Start: 2025-01-08

## 2025-01-08 RX ORDER — ONDANSETRON 4 MG/1
4 TABLET, ORALLY DISINTEGRATING ORAL EVERY 8 HOURS PRN
Status: CANCELLED | OUTPATIENT
Start: 2025-01-08

## 2025-01-08 RX ORDER — DIPHENHYDRAMINE HYDROCHLORIDE 50 MG/ML
12.5 INJECTION INTRAMUSCULAR; INTRAVENOUS
Status: CANCELLED | OUTPATIENT
Start: 2025-01-08 | End: 2025-01-09

## 2025-01-08 RX ORDER — MORPHINE SULFATE 2 MG/ML
4 INJECTION, SOLUTION INTRAMUSCULAR; INTRAVENOUS
Status: CANCELLED | OUTPATIENT
Start: 2025-01-08

## 2025-01-08 RX ORDER — BUPIVACAINE HYDROCHLORIDE AND EPINEPHRINE 5; 5 MG/ML; UG/ML
INJECTION, SOLUTION EPIDURAL; INTRACAUDAL; PERINEURAL PRN
Status: DISCONTINUED | OUTPATIENT
Start: 2025-01-08 | End: 2025-01-08 | Stop reason: ALTCHOICE

## 2025-01-08 RX ORDER — ONDANSETRON 2 MG/ML
4 INJECTION INTRAMUSCULAR; INTRAVENOUS
Status: CANCELLED | OUTPATIENT
Start: 2025-01-08 | End: 2025-01-09

## 2025-01-08 RX ADMIN — Medication 100 MCG: at 09:24

## 2025-01-08 RX ADMIN — Medication 100 MCG: at 09:35

## 2025-01-08 RX ADMIN — ROCURONIUM BROMIDE 20 MG: 10 INJECTION INTRAVENOUS at 08:55

## 2025-01-08 RX ADMIN — ROCURONIUM BROMIDE 10 MG: 10 INJECTION INTRAVENOUS at 09:24

## 2025-01-08 RX ADMIN — ROCURONIUM BROMIDE 50 MG: 10 INJECTION INTRAVENOUS at 07:32

## 2025-01-08 RX ADMIN — SODIUM CHLORIDE: 9 INJECTION, SOLUTION INTRAVENOUS at 07:30

## 2025-01-08 RX ADMIN — FENTANYL CITRATE 25 MCG: 50 INJECTION, SOLUTION INTRAMUSCULAR; INTRAVENOUS at 07:44

## 2025-01-08 RX ADMIN — DEXAMETHASONE SODIUM PHOSPHATE 8 MG: 10 INJECTION, EMULSION INTRAMUSCULAR; INTRAVENOUS at 07:36

## 2025-01-08 RX ADMIN — ONDANSETRON 4 MG: 2 INJECTION INTRAMUSCULAR; INTRAVENOUS at 10:16

## 2025-01-08 RX ADMIN — PROPOFOL 160 MG: 10 INJECTION, EMULSION INTRAVENOUS at 07:32

## 2025-01-08 RX ADMIN — SODIUM CHLORIDE: 9 INJECTION, SOLUTION INTRAVENOUS at 09:40

## 2025-01-08 RX ADMIN — WATER 2000 MG: 1 INJECTION INTRAMUSCULAR; INTRAVENOUS; SUBCUTANEOUS at 07:40

## 2025-01-08 RX ADMIN — FENTANYL CITRATE 25 MCG: 50 INJECTION, SOLUTION INTRAMUSCULAR; INTRAVENOUS at 08:55

## 2025-01-08 RX ADMIN — FENTANYL CITRATE 50 MCG: 50 INJECTION, SOLUTION INTRAMUSCULAR; INTRAVENOUS at 07:32

## 2025-01-08 RX ADMIN — LIDOCAINE HYDROCHLORIDE 60 MG: 20 INJECTION, SOLUTION INTRAVENOUS at 07:32

## 2025-01-08 RX ADMIN — ROCURONIUM BROMIDE 20 MG: 10 INJECTION INTRAVENOUS at 08:15

## 2025-01-08 RX ADMIN — SUGAMMADEX 200 MG: 100 INJECTION, SOLUTION INTRAVENOUS at 10:16

## 2025-01-08 RX ADMIN — Medication 100 MCG: at 09:07

## 2025-01-08 RX ADMIN — Medication 100 MCG: at 08:36

## 2025-01-08 RX ADMIN — PROPOFOL 40 MG: 10 INJECTION, EMULSION INTRAVENOUS at 08:15

## 2025-01-08 ASSESSMENT — PAIN - FUNCTIONAL ASSESSMENT
PAIN_FUNCTIONAL_ASSESSMENT: NONE - DENIES PAIN
PAIN_FUNCTIONAL_ASSESSMENT: 0-10

## 2025-01-08 NOTE — ANESTHESIA PRE PROCEDURE
Department of Anesthesiology  Preprocedure Note       Name:  Earl Arevalo   Age:  74 y.o.  :  1950                                          MRN:  [id number]         Date:  2025      Surgeon: Surgeon(s):  Arash Cuenca DPM    Procedure: Procedure(s):  ARTHRODESIS FIRST MPJ LEFT  FOOT WITH PLATE AND SCREW, DISTAL AKIN OSTEOTOMY WITH STAPLE FIXATION, ARTHRODESIS SECOND DIGIT PIPJ, ARTHRODESIS THIRD DIGIT PIPJ    Medications prior to admission:   Prior to Admission medications    Medication Sig Start Date End Date Taking? Authorizing Provider   pravastatin (PRAVACHOL) 10 MG tablet Take 1 tablet by mouth daily   Yes ProviderEmilee MD   NONFORMULARY 1 tablet 3 times daily (with meals) EB-S4   Yes ProviderEmilee MD   ibandronate (BONIVA) 150 MG tablet Take 1 tablet by mouth every 30 days On first day of the month   Yes Provider, MD Emilee   methotrexate (RHEUMATREX) 2.5 MG chemo tablet Take 7 tablets by mouth once a week On Wed   Yes ProviderEmilee MD   ezetimibe (ZETIA) 10 MG tablet Take 1 tablet by mouth daily   Yes ProviderEmilee MD   lisinopril (PRINIVIL;ZESTRIL) 10 MG tablet Take 1 tablet by mouth daily   Yes Provider, MD Emilee   Calcium Carbonate (CALCIUM 500 PO) Take 1 tablet by mouth 2 times daily   Yes ProviderEmilee MD   hydroxychloroquine (PLAQUENIL) 200 MG tablet Take 1 tablet by mouth daily   Yes Provider, MD Emilee   diclofenac (VOLTAREN) 75 MG EC tablet Take 1 tablet by mouth daily    ProviderEmilee MD       Current medications:    Current Facility-Administered Medications   Medication Dose Route Frequency Provider Last Rate Last Admin   • ceFAZolin (ANCEF) 2,000 mg in sterile water 20 mL IV syringe  2,000 mg IntraVENous On Call to OR Man Gutierrez DPM       • 0.9 % sodium chloride infusion   IntraVENous Continuous Man Gutierrez DPM       • sodium chloride flush 0.9 % injection 5-40 mL  5-40 mL IntraVENous 2 times per day Man Gutierrez DPM

## 2025-01-08 NOTE — DISCHARGE INSTRUCTIONS
Post Op Instructions    Pt Name: Earl Arevalo  Medical Record Number: [id number]  Today's Date: 1/8/2025    GENERAL ANESTHESIA OR SEDATION  1. Do not drive or operate hazardous machinery for 24 hours.  2. Do not make important business or personal decisions for 24 hours.  3. Do not drink alcoholic beverages or use tobacco for 24 hours.    ACTIVITY INSTRUCTIONS:  [x] Rest today. Resume light to normal activity tomorrow.   [x] You may ambulate as tolerated in your post op shoe/boot.  [] No weight is to be placed on the operative limb.  Use crutches, walker, Roll-a-bout as needed.  [x]Elevate the operative limb as much as possible to reduce swelling and discomfort for the first 72 hours      DIET INSTRUCTIONS:  []Begin with clear liquids. If not nauseated, may increase to a low-fat diet when you desire.   [x]Regular diet as tolerated.  []Other:     MEDICATIONS  [x]Prescription sent with you to be used as directed.   Do not drink alcohol or drive while taking these medications. You may experience dizziness or drowsiness with these medications. You may also experience constipation which can be relieved with stool softners or laxatives.  [x]You may resume your daily prescription medication schedule unless otherwise specified.  [x]If taking 325mg Aspirin or other blood thinners such as Coumadin or Plavix, you may resume tomorrow, unless told otherwise.     WOUND/DRESSING INSTRUCTIONS:  Always ensure you and your care giver clean hands before and after caring for the wound.  [x] Keep dressing clean and dry until you are seen in the offfice      [x] Ice operative site for 20 minutes 4 times a day.   - you may apply ice bag behind the knee or directly on the foot/ankle bandage.   - do not apply ice directly to the skin  [x] You may loosen the ACE wrap if it feels too tight, but do not disturb the underlying white gauze wrap.      FOLLOW-UP CARE. SPECIFICALLY WATCH FOR:   Fever over 101 degrees by mouth   Increased redness,

## 2025-01-08 NOTE — PROGRESS NOTES
Pt. Admitted in stable condition. Consent signed. VS obtained and WNL. INT inserted without complications.  Pt. Denies all other needs. Warm blanket provided. Call light left within reach. Family brought to bedside.

## 2025-01-08 NOTE — H&P
[facility]  History and Physical Update    Pt Name: Earl Arevalo  MRN: [id number]  YOB: 1950  Date of evaluation: 1/8/2025    [x] I have examined the patient and reviewed the H&P/Consult and there are no changes to the patient or plans.    [] I have examined the patient and reviewed the H&P/Consult and have noted the following changes:        Arash Cuenca DPM DPM  Electronically signed 1/8/2025 at 7:25 AM

## 2025-01-08 NOTE — ANESTHESIA POSTPROCEDURE EVALUATION
Department of Anesthesiology  Postprocedure Note    Patient: Earl Arevalo  MRN: [id number]  YOB: 1950  Date of evaluation: 1/8/2025    Procedure Summary       Date: 01/08/25 Room / Location: [address]    Anesthesia Start: 0729 Anesthesia Stop: 1029    Procedure: ARTHRODESIS FIRST MPJ LEFT  FOOT WITH PLATE AND SCREW, DISTAL AKIN OSTEOTOMY WITH STAPLE FIXATION, ARTHRODESIS SECOND DIGIT PIPJ, ARTHRODESIS THIRD DIGIT PIPJ, FLEXOR TENOTOMY AND CAPSULOTOMY 4TH AND 5TH DIGITS (Left: Foot) Diagnosis:       Other deformities of toe(s) (acquired), left foot      Hallux rigidus of left foot      Hallux valgus of left foot      Hammer toe of left foot      Primary osteoarthritis of left ankle      (Other deformities of toe(s) (acquired), left foot [M20.5X2])      (Hallux rigidus of left foot [M20.22])      (Hallux valgus of left foot [M20.12])      (Hammer toe of left foot [M20.42])      (Primary osteoarthritis of left ankle [M19.072])    Surgeons: Arash Cuenca DPM Responsible Provider: Mina Silva DO    Anesthesia Type: general ASA Status: 2            Anesthesia Type: No value filed.    Karin Phase I: Karin Score: 10    Karin Phase II:      Anesthesia Post Evaluation    Patient location during evaluation: PACU  Patient participation: complete - patient participated  Level of consciousness: awake and alert  Airway patency: patent  Nausea & Vomiting: no vomiting and no nausea  Cardiovascular status: hemodynamically stable  Respiratory status: acceptable  Hydration status: stable  Pain management: adequate    No notable events documented.

## 2025-01-08 NOTE — BRIEF OP NOTE
OD4MM JOHNNIE HDLSS SHT THRD MINI MONSTER  PARAGON 28-WD  Left 1 Implanted         Drains: * No LDAs found *    Findings:  Infection Present At Time Of Surgery (PATOS) (choose all levels that have infection present):  No infection present  Other Findings: Consistent with Diagnosis    Hemostasis: pneumatic ankle tourniquet at 250mmHg    Injectables: 30cc 0.5% marcaine with epinephrine    Materials: 4-0 prolene and 3-0 vicryl     Electronically signed by Cong Montanez DPM on 1/8/2025 at 10:34 AM

## 2025-01-08 NOTE — OP NOTE
the second MTPJ extending just distal to the second PIPJ. A #15 blade was used to make a full-thickness incision down to the level of subcutaneous tissue. The skin incisions were then carefully retracted using skin hooks, and blunt dissection was then carried down to the level of the extensor bernal apparatus. Care was taken to avoid all vital neurovascular structures. Any bleeders were cauterized with a Bovie. A scalpel blade was then used to make a transverse tenotomy cut on the most dorsal aspect of the proximal interphalangeal joint. The medial and lateral collateral ligaments were then released and the extensor tendon was dissected proximally. Following the transverse tenotomy, exposure to the dorsal extensor bernal apparatus was performed over the metatarsophalangeal joint itself. A #15 blade was used to release the joint along the base of the proximal phalanx medially and laterally. A TPS sagittal saw was then used to remove the proximal phalanx head, approximately 3 mm in length, removing all articular cartilage. A TPS sagittal saw was then used to resect the base of the middle phalanx. A guidewire from the [place] 28 hammertube implant set was inserted in a retrograde fashion to span from the proximal phalanx to the proximal phalangeal base. A 3.5 cannulated drill was then used to drill the proximal phalanx to the second laser line proximally. The hammertoe pin was then removed and placed antegrade through the middle phalanx. Again, the 3.5 cannulated drill was utilized to the first laser line within the middle phalanx. The pin was removed. A 0-degree 3.5mm hammertube implant was then placed on an  and was inserted via hand pressure into the proximal phalanx. A tamp was then utilized to fully insert the implant into the proximal phalanx. The middle phalanx was lined up and the implant was then inserted into the middle phalanx. Good positioning of the implant with good bone to bone apposition at the

## 2025-02-28 ENCOUNTER — HOSPITAL ENCOUNTER (OUTPATIENT)
Age: 75
Discharge: HOME | End: 2025-02-28
Payer: MEDICARE

## 2025-02-28 LAB
ALT SERPL W/O P-5'-P-CCNC: 27 U/L (ref 10–50)
AST SERPL-CCNC: 25 U/L (ref 10–50)
BASOPHILS ABSOLUTE: 0 THOU/MM3 (ref 0–0.1)
BASOPHILS NFR BLD AUTO: 0.3 %
CREAT SERPL-MCNC: 1 MG/DL (ref 0.7–1.2)
DEPRECATED RDW RBC AUTO: 48.5 FL (ref 35–45)
EOSINOPHIL NFR BLD AUTO: 2.3 %
EOSINOPHILS ABSOLUTE: 0.1 THOU/MM3 (ref 0–0.4)
ERYTHROCYTE [DISTWIDTH] IN BLOOD BY AUTOMATED COUNT: 13.4 % (ref 11.5–14.5)
ERYTHROCYTE [SEDIMENTATION RATE] IN BLOOD BY WESTERGREN METHOD: 4 MM/HR (ref 0–10)
GFR SERPL CREATININE-BSD FRML MDRD: 79 ML/MIN/1.73M2
HCT VFR BLD AUTO: 46.4 % (ref 42–52)
HGB BLD-MCNC: 15.5 GM/DL (ref 14–18)
IMM GRANULOCYTES # BLD AUTO: 0.01 THOU/MM3 (ref 0–0.07)
IMM GRANULOCYTES NFR BLD AUTO: 0.2 %
LYMPHOCYTES ABSOLUTE: 2 THOU/MM3 (ref 1–4.8)
LYMPHOCYTES NFR BLD AUTO: 31 %
MCH RBC QN AUTO: 32.7 PG (ref 26–33)
MCHC RBC AUTO-ENTMCNC: 33.4 GM/DL (ref 32.2–35.5)
MCV RBC AUTO: 97.9 FL (ref 80–94)
MONOCYTES ABSOLUTE: 0.7 THOU/MM3 (ref 0.4–1.3)
MONOCYTES NFR BLD AUTO: 10 %
NEUTROPHILS ABSOLUTE: 3.7 THOU/MM3 (ref 1.8–7.7)
NEUTROPHILS NFR BLD AUTO: 56.2 %
NRBC BLD AUTO-RTO: 0 /100 WBC
PLATELET # BLD AUTO: 236 THOU/MM3 (ref 130–400)
PMV BLD AUTO: 9.6 FL (ref 9.4–12.4)
RBC # BLD AUTO: 4.74 MILL/MM3 (ref 4.7–6.1)
WBC # BLD AUTO: 6.5 THOU/MM3 (ref 4.8–10.8)

## 2025-02-28 PROCEDURE — 84450 TRANSFERASE (AST) (SGOT): CPT

## 2025-02-28 PROCEDURE — 85651 RBC SED RATE NONAUTOMATED: CPT

## 2025-02-28 PROCEDURE — 85025 COMPLETE CBC W/AUTO DIFF WBC: CPT

## 2025-02-28 PROCEDURE — 36415 COLL VENOUS BLD VENIPUNCTURE: CPT

## 2025-02-28 PROCEDURE — 84460 ALANINE AMINO (ALT) (SGPT): CPT

## 2025-02-28 PROCEDURE — 82565 ASSAY OF CREATININE: CPT

## 2025-06-04 ENCOUNTER — HOSPITAL ENCOUNTER (OUTPATIENT)
Age: 75
Discharge: HOME | End: 2025-06-04
Payer: MEDICARE

## 2025-06-04 LAB
ALT SERPL W/O P-5'-P-CCNC: 29 U/L (ref 10–50)
AST SERPL-CCNC: 29 U/L (ref 10–50)
BASOPHILS ABSOLUTE: 0 THOU/MM3 (ref 0–0.1)
BASOPHILS NFR BLD AUTO: 0.8 %
CREAT SERPL-MCNC: 1.1 MG/DL (ref 0.7–1.2)
DEPRECATED RDW RBC AUTO: 49.3 FL (ref 35–45)
EOSINOPHIL NFR BLD AUTO: 2.2 %
EOSINOPHILS ABSOLUTE: 0.1 THOU/MM3 (ref 0–0.4)
ERYTHROCYTE [DISTWIDTH] IN BLOOD BY AUTOMATED COUNT: 13.6 % (ref 11.5–14.5)
ERYTHROCYTE [SEDIMENTATION RATE] IN BLOOD BY WESTERGREN METHOD: 5 MM/HR (ref 0–20)
GFR SERPL CREATININE-BSD FRML MDRD: 70 ML/MIN/1.73M2
HCT VFR BLD AUTO: 45.6 % (ref 42–52)
HGB BLD-MCNC: 14.8 GM/DL (ref 14–18)
IMM GRANULOCYTES # BLD AUTO: 0.01 THOU/MM3 (ref 0–0.07)
IMM GRANULOCYTES NFR BLD AUTO: 0.2 %
LYMPHOCYTES ABSOLUTE: 1.5 THOU/MM3 (ref 1–4.8)
LYMPHOCYTES NFR BLD AUTO: 29.7 %
MCH RBC QN AUTO: 32.2 PG (ref 26–33)
MCHC RBC AUTO-ENTMCNC: 32.5 GM/DL (ref 32.2–35.5)
MCV RBC AUTO: 99.1 FL (ref 80–94)
MONOCYTES ABSOLUTE: 0.6 THOU/MM3 (ref 0.4–1.3)
MONOCYTES NFR BLD AUTO: 12.2 %
NEUTROPHILS ABSOLUTE: 2.8 THOU/MM3 (ref 1.8–7.7)
NEUTROPHILS NFR BLD AUTO: 54.9 %
NRBC BLD AUTO-RTO: 0 /100 WBC
PLATELET # BLD AUTO: 244 THOU/MM3 (ref 130–400)
PMV BLD AUTO: 10 FL (ref 9.4–12.4)
RBC # BLD AUTO: 4.6 MILL/MM3 (ref 4.7–6.1)
WBC # BLD AUTO: 5.1 THOU/MM3 (ref 4.8–10.8)

## 2025-06-04 PROCEDURE — 36415 COLL VENOUS BLD VENIPUNCTURE: CPT

## 2025-06-04 PROCEDURE — 85025 COMPLETE CBC W/AUTO DIFF WBC: CPT

## 2025-06-04 PROCEDURE — 82565 ASSAY OF CREATININE: CPT

## 2025-06-04 PROCEDURE — 84460 ALANINE AMINO (ALT) (SGPT): CPT

## 2025-06-04 PROCEDURE — 84450 TRANSFERASE (AST) (SGOT): CPT

## 2025-06-04 PROCEDURE — 85651 RBC SED RATE NONAUTOMATED: CPT

## 2025-08-07 ENCOUNTER — HOSPITAL ENCOUNTER (OUTPATIENT)
Age: 75
Discharge: HOME | End: 2025-08-07
Payer: MEDICARE

## 2025-08-07 LAB
ALT SERPL W/O P-5'-P-CCNC: 30 U/L (ref 10–50)
AST SERPL-CCNC: 28 U/L (ref 10–50)
BASOPHILS ABSOLUTE: 0 THOU/MM3 (ref 0–0.1)
BASOPHILS NFR BLD AUTO: 0.6 %
CREAT SERPL-MCNC: 1.2 MG/DL (ref 0.7–1.2)
DEPRECATED RDW RBC AUTO: 49.7 FL (ref 35–45)
EOSINOPHIL NFR BLD AUTO: 2.7 %
EOSINOPHILS ABSOLUTE: 0.1 THOU/MM3 (ref 0–0.4)
ERYTHROCYTE [DISTWIDTH] IN BLOOD BY AUTOMATED COUNT: 13.3 % (ref 11.5–14.5)
ERYTHROCYTE [SEDIMENTATION RATE] IN BLOOD BY WESTERGREN METHOD: 5 MM/HR (ref 0–20)
GFR SERPL CREATININE-BSD FRML MDRD: 63 ML/MIN/1.73M2
HCT VFR BLD AUTO: 46.6 % (ref 42–52)
HGB BLD-MCNC: 14.9 GM/DL (ref 14–18)
IMM GRANULOCYTES # BLD AUTO: 0.01 THOU/MM3 (ref 0–0.07)
IMM GRANULOCYTES NFR BLD AUTO: 0.2 %
LYMPHOCYTES ABSOLUTE: 1.5 THOU/MM3 (ref 1–4.8)
LYMPHOCYTES NFR BLD AUTO: 28.2 %
MCH RBC QN AUTO: 32.2 PG (ref 26–33)
MCHC RBC AUTO-ENTMCNC: 32 GM/DL (ref 32.2–35.5)
MCV RBC AUTO: 100.6 FL (ref 80–94)
MONOCYTES ABSOLUTE: 0.5 THOU/MM3 (ref 0.4–1.3)
MONOCYTES NFR BLD AUTO: 10.1 %
NEUTROPHILS ABSOLUTE: 3 THOU/MM3 (ref 1.8–7.7)
NEUTROPHILS NFR BLD AUTO: 58.2 %
NRBC BLD AUTO-RTO: 0 /100 WBC
PLATELET # BLD AUTO: 241 THOU/MM3 (ref 130–400)
PMV BLD AUTO: 10 FL (ref 9.4–12.4)
RBC # BLD AUTO: 4.63 MILL/MM3 (ref 4.7–6.1)
WBC # BLD AUTO: 5.2 THOU/MM3 (ref 4.8–10.8)

## 2025-08-07 PROCEDURE — 85651 RBC SED RATE NONAUTOMATED: CPT

## 2025-08-07 PROCEDURE — 85025 COMPLETE CBC W/AUTO DIFF WBC: CPT

## 2025-08-07 PROCEDURE — 36415 COLL VENOUS BLD VENIPUNCTURE: CPT

## 2025-08-07 PROCEDURE — 84450 TRANSFERASE (AST) (SGOT): CPT

## 2025-08-07 PROCEDURE — 84460 ALANINE AMINO (ALT) (SGPT): CPT

## 2025-08-07 PROCEDURE — 82565 ASSAY OF CREATININE: CPT

## (undated) DEVICE — TUBING, SUCTION, 1/4" X 20', STRAIGHT: Brand: MEDLINE INDUSTRIES, INC.

## (undated) DEVICE — SPONGE GZ W4XL4IN COT 12 PLY TYP VII WVN C FLD DSGN

## (undated) DEVICE — DRILL,  2.4 X 140MM, SOLID, MEASURING, LONG, AO: Brand: BABY GORILLA®/GORILLA® PLATING SYSTEM

## (undated) DEVICE — PRECISION (9.0 X 0.51 X 31.0MM)

## (undated) DEVICE — KIT,ANTI FOG,W/SPONGE & FLUID,SOFT PACK: Brand: MEDLINE

## (undated) DEVICE — SOLUTION IRRIG 1500ML STRL H2O USP POUR PLAS BTL

## (undated) DEVICE — JCKSON TBL POSTNER NO HD REST: Brand: MEDLINE INDUSTRIES, INC.

## (undated) DEVICE — 4-PORT MANIFOLD: Brand: NEPTUNE 2

## (undated) DEVICE — PAD,NON-ADHERENT,3X8,STERILE,LF,1/PK: Brand: MEDLINE

## (undated) DEVICE — GAUZE,SPONGE,8"X4",12PLY,XRAY,STRL,LF: Brand: MEDLINE

## (undated) DEVICE — Device

## (undated) DEVICE — Z INACTIVE USE 2735373 APPLICATOR FBR LAIN COT WOOD TIP ECONOMICAL

## (undated) DEVICE — CELL SAVER TUBING

## (undated) DEVICE — GOWN,SIRUS,NON REINFRCD,LARGE,SET IN SL: Brand: MEDLINE

## (undated) DEVICE — OLIVE WIRE, THREADED, 1.4MM
Type: IMPLANTABLE DEVICE | Site: FOOT | Status: NON-FUNCTIONAL
Brand: BABY GORILLA/GORILLA PLATING SYSTEM
Removed: 2025-01-08

## (undated) DEVICE — 1010 S-DRAPE TOWEL DRAPE 10/BX: Brand: STERI-DRAPE™

## (undated) DEVICE — NEEDLE SPNL L3.5IN PNK HUB S STL REG WALL FIT STYL W/ QNCKE

## (undated) DEVICE — GLOVE ORANGE PI 7 1/2   MSG9075

## (undated) DEVICE — COUNTERSINK, 4.0 HEADLESS: Brand: MONSTER® SCREW SYSTEM

## (undated) DEVICE — AGENT HEMSTAT 8ML FLX TIP MTRX + DISP SURGIFLO

## (undated) DEVICE — AGENT HEMSTAT W2XL4IN OXIDIZED REGENERATED CELOS ABSRB SFT

## (undated) DEVICE — BIPOLAR SEALER 23-112-1 AQM 6.0: Brand: AQUAMANTYS ®

## (undated) DEVICE — 4.0MM PRECISION ROUND

## (undated) DEVICE — BONE FENESTRATION PERFORATOR: Brand: BABY GORILLA®/GORILLA® PLATING SYSTEM

## (undated) DEVICE — SINU FOAM: Brand: SINU-FOAM

## (undated) DEVICE — IMPREGNATED GAUZE DRESSING: Brand: CUTICERIN 7.5X7.5CM CTN 50

## (undated) DEVICE — P28, K-WIRE, 1.6 X 150 MM, SINGLE TROCAR, SMOOTH, SS
Type: IMPLANTABLE DEVICE | Site: FOOT | Status: NON-FUNCTIONAL
Brand: MULTI SYSTEM
Removed: 2025-01-08

## (undated) DEVICE — CANISTER, RIGID, 2000CC: Brand: MEDLINE INDUSTRIES, INC.

## (undated) DEVICE — SUTURE PROL SZ 4-0 L18IN NONABSORBABLE BLU L19MM PS-2 3/8 8682G

## (undated) DEVICE — SUTURE PROL SZ 5-0 L36IN NONABSORBABLE BLU L17MM RB-1 1/2 8556H

## (undated) DEVICE — GLOVE ORANGE PI 8   MSG9080

## (undated) DEVICE — PACK-MAJOR

## (undated) DEVICE — AGENT HEMOSTATIC SURGIFLOW MATRIX KIT W/THROMBIN

## (undated) DEVICE — YANKAUER,BULB TIP,W/O VENT,RIGID,STERILE: Brand: MEDLINE

## (undated) DEVICE — CONMED DISPOSABLE BIPOLAR CABLE, 10' (3.05M): Brand: CONMED

## (undated) DEVICE — BUR CUT RND FLUT AGRSV 3.0MM

## (undated) DEVICE — THE MILL DISPOSABLE - MEDIUM

## (undated) DEVICE — K-WIRE, SINGLE ENDED TROCAR TIP, SMOOTH, 1.2 X 150MM
Type: IMPLANTABLE DEVICE | Site: FOOT | Status: NON-FUNCTIONAL
Brand: MONSTER SCREW SYSTEM
Removed: 2025-01-08

## (undated) DEVICE — SUTURE VCRL SZ 1 L18IN ABSRB VLT CTX L48MM 1/2 CIR J765D

## (undated) DEVICE — TOTAL TRAY, DB, 100% SILI FOLEY, 16FR 10: Brand: MEDLINE

## (undated) DEVICE — DRESSING TRNSPAR W5XL4.5IN FLM SHT SEMIPERMEABLE WIND

## (undated) DEVICE — CELL SAVER PACK

## (undated) DEVICE — GLOVE SURG SZ 65 THK91MIL LTX FREE SYN POLYISOPRENE

## (undated) DEVICE — LINER SUCT CANSTR 1500CC SEMI RIG W/ POR HYDROPHOBIC SHUT

## (undated) DEVICE — BASIC SINGLE BASIN BTC-LF: Brand: MEDLINE INDUSTRIES, INC.

## (undated) DEVICE — Y-TYPE TUR/BLADDER IRRIGATION SET, REGULATING CLAMP

## (undated) DEVICE — CODMAN® SURGICAL PATTIES 1/2" X 3" (1.27CM X 7.62CM): Brand: CODMAN®

## (undated) DEVICE — GLOVE ORANGE PI 7   MSG9070

## (undated) DEVICE — MICRO TIP WIPE: Brand: DEVON

## (undated) DEVICE — THIN OFFSET (9.0 X 0.38 X 25.0MM)

## (undated) DEVICE — SOLUTION IV 1000ML 0.9% SOD CHL PH 5 INJ USP VIAFLX PLAS

## (undated) DEVICE — MINI MONSTER 4.0 X 28MM HEADLESS SCREW, SHORT THREAD
Type: IMPLANTABLE DEVICE | Site: FOOT | Status: NON-FUNCTIONAL
Brand: MONSTER SCREW SYSTEM
Removed: 2025-01-08

## (undated) DEVICE — BLADE CLIPPER GEN PURP NS

## (undated) DEVICE — PROBE STIM 3 MM FOR PEDCL SCREW DISP

## (undated) DEVICE — BANDAGE ADH W1XL3IN NAT FAB WVN FLX DURABLE N ADH PD SEAL

## (undated) DEVICE — BUR CUT RND FLUT AGRSV 4.0MM

## (undated) DEVICE — KIT EVAC 400CC PVC RADPQ Y CONN

## (undated) DEVICE — HEAD POSITIONER, SURGICAL: Brand: DEROYAL

## (undated) DEVICE — PREP SOL PVP IODINE 4%  4 OZ/BTL

## (undated) DEVICE — DRAIN SURG 10FR PVC TB W/ TRCR MID PERF NO RESVR HUBLESS

## (undated) DEVICE — GLOVE ORANGE PI 8 1/2   MSG9085

## (undated) DEVICE — 3M™ WARMING BLANKET, UPPER BODY, 10 PER CASE, 42268: Brand: BAIR HUGGER™

## (undated) DEVICE — SYRINGE MED 10ML TRNSLUC BRL PLUNG BLK MRK POLYPR CTRL

## (undated) DEVICE — BLADE ES L4IN INSUL EDGE

## (undated) DEVICE — GOWN,AURORA,NON-REINFORCED,2XL: Brand: MEDLINE

## (undated) DEVICE — NEEDLE HYPO 25GA L1IN PIVOTING SHLD FOR LUERLOCK SYR ECLIPSE

## (undated) DEVICE — GLOVE SURG SZ 85 L12IN FNGR ORTHO 126MIL CRM LTX FREE

## (undated) DEVICE — SYRINGE,EAR/ULCER, 2 OZ, STERILE: Brand: MEDLINE

## (undated) DEVICE — CODMAN® SURGICAL PATTIES 1" X 1" (2.54CM X 2.54CM): Brand: CODMAN®

## (undated) DEVICE — DRESSING TRNSPAR W8XL12IN FLM SURESITE 123

## (undated) DEVICE — BAG,BANDED,W/RUBBERBAND,STERILE,30X36: Brand: MEDLINE